# Patient Record
Sex: MALE | Race: WHITE | NOT HISPANIC OR LATINO | Employment: OTHER | URBAN - METROPOLITAN AREA
[De-identification: names, ages, dates, MRNs, and addresses within clinical notes are randomized per-mention and may not be internally consistent; named-entity substitution may affect disease eponyms.]

---

## 2017-01-05 DIAGNOSIS — E03.9 ACQUIRED HYPOTHYROIDISM: ICD-10-CM

## 2017-01-05 DIAGNOSIS — E78.5 HYPERLIPIDEMIA WITH TARGET LDL LESS THAN 100: ICD-10-CM

## 2017-01-05 DIAGNOSIS — I10 HYPERTENSION GOAL BP (BLOOD PRESSURE) < 140/90: ICD-10-CM

## 2017-01-05 DIAGNOSIS — M10.9 GOUT, UNSPECIFIED: ICD-10-CM

## 2017-01-05 LAB
ALT SERPL W P-5'-P-CCNC: 33 U/L (ref 0–70)
ANION GAP SERPL CALCULATED.3IONS-SCNC: 8 MMOL/L (ref 3–14)
AST SERPL W P-5'-P-CCNC: 24 U/L (ref 0–45)
BUN SERPL-MCNC: 24 MG/DL (ref 7–30)
CALCIUM SERPL-MCNC: 9.3 MG/DL (ref 8.5–10.1)
CHLORIDE SERPL-SCNC: 108 MMOL/L (ref 94–109)
CHOLEST SERPL-MCNC: 172 MG/DL
CO2 SERPL-SCNC: 25 MMOL/L (ref 20–32)
CREAT SERPL-MCNC: 1.42 MG/DL (ref 0.66–1.25)
GFR SERPL CREATININE-BSD FRML MDRD: 50 ML/MIN/1.7M2
GLUCOSE SERPL-MCNC: 98 MG/DL (ref 70–99)
HDLC SERPL-MCNC: 69 MG/DL
LDLC SERPL CALC-MCNC: 85 MG/DL
NONHDLC SERPL-MCNC: 103 MG/DL
POTASSIUM SERPL-SCNC: 4.4 MMOL/L (ref 3.4–5.3)
SODIUM SERPL-SCNC: 141 MMOL/L (ref 133–144)
TRIGL SERPL-MCNC: 89 MG/DL
TSH SERPL DL<=0.005 MIU/L-ACNC: 2.1 MU/L (ref 0.4–4)
URATE SERPL-MCNC: 5.7 MG/DL (ref 3.5–7.2)

## 2017-01-05 PROCEDURE — 80061 LIPID PANEL: CPT | Performed by: INTERNAL MEDICINE

## 2017-01-05 PROCEDURE — 36415 COLL VENOUS BLD VENIPUNCTURE: CPT | Performed by: INTERNAL MEDICINE

## 2017-01-05 PROCEDURE — 84450 TRANSFERASE (AST) (SGOT): CPT | Performed by: INTERNAL MEDICINE

## 2017-01-05 PROCEDURE — 84443 ASSAY THYROID STIM HORMONE: CPT | Performed by: INTERNAL MEDICINE

## 2017-01-05 PROCEDURE — 80048 BASIC METABOLIC PNL TOTAL CA: CPT | Performed by: INTERNAL MEDICINE

## 2017-01-05 PROCEDURE — 84550 ASSAY OF BLOOD/URIC ACID: CPT | Performed by: INTERNAL MEDICINE

## 2017-01-05 PROCEDURE — 84460 ALANINE AMINO (ALT) (SGPT): CPT | Performed by: INTERNAL MEDICINE

## 2017-01-05 NOTE — Clinical Note
[address] [place] [address]    January 6, 2017    Wei Kline  [address] [facility] [address]          Dear Rebekah Carvajalit lab to be discussed in clinic.  Enclosed is a copy of your results.     Results for orders placed or performed in visit on 01/05/17   Lipid panel reflex to direct LDL   Result Value Ref Range    Cholesterol 172 <200 mg/dL    Triglycerides 89 <150 mg/dL    HDL Cholesterol 69 >39 mg/dL    LDL Cholesterol Calculated 85 <100 mg/dL    Non HDL Cholesterol 103 <130 mg/dL   AST   Result Value Ref Range    AST 24 0 - 45 U/L   ALT   Result Value Ref Range    ALT 33 0 - 70 U/L   Basic metabolic panel   Result Value Ref Range    Sodium 141 133 - 144 mmol/L    Potassium 4.4 3.4 - 5.3 mmol/L    Chloride 108 94 - 109 mmol/L    Carbon Dioxide 25 20 - 32 mmol/L    Anion Gap 8 3 - 14 mmol/L    Glucose 98 70 - 99 mg/dL    Urea Nitrogen 24 7 - 30 mg/dL    Creatinine 1.42 (H) 0.66 - 1.25 mg/dL    GFR Estimate 50 (L) >60 mL/min/1.7m2    GFR Estimate If Black 60 (L) >60 mL/min/1.7m2    Calcium 9.3 8.5 - 10.1 mg/dL   TSH with free T4 reflex   Result Value Ref Range    TSH 2.10 0.40 - 4.00 mU/L   Uric acid   Result Value Ref Range    Uric Acid 5.7 3.5 - 7.2 mg/dL       If you have any questions or concerns, please call myself or my nurse at [phone].      Sincerely,        Geno Mcclelland MD/pb

## 2017-02-07 ENCOUNTER — OFFICE VISIT (OUTPATIENT)
Dept: PODIATRY | Facility: CLINIC | Age: 67
End: 2017-02-07
Payer: COMMERCIAL

## 2017-02-07 VITALS — WEIGHT: 290 LBS | BODY MASS INDEX: 49.51 KG/M2 | HEIGHT: 64 IN | HEART RATE: 82 BPM

## 2017-02-07 DIAGNOSIS — B35.1 DERMATOPHYTOSIS OF NAIL: ICD-10-CM

## 2017-02-07 DIAGNOSIS — L60.0 INGROWING NAIL: Primary | ICD-10-CM

## 2017-02-07 PROCEDURE — 99203 OFFICE O/P NEW LOW 30 MIN: CPT | Performed by: PODIATRIST

## 2017-02-07 NOTE — NURSING NOTE
"Chief Complaint   Patient presents with     Ingrown Toenail     R big toe     Fungal Infection     Right big toe       Initial Pulse 82  Ht 1.626 m (5' 4\")  Wt 131.543 kg (290 lb)  BMI 49.75 kg/m2 Estimated body mass index is 49.75 kg/(m^2) as calculated from the following:    Height as of this encounter: 1.626 m (5' 4\").    Weight as of this encounter: 131.543 kg (290 lb).  Medication Reconciliation: complete  "

## 2017-02-07 NOTE — PATIENT INSTRUCTIONS
We wish you continued good healing. If you have any questions or concerns, please do not hesitate to contact us at [phone].      Please remember to call and schedule a follow up appointment if one was recommended at your earliest convenience.   PODIATRY CLINIC HOURS  TELEPHONE NUMBER    Dr. Benjamin Murrieta D.P.M [facility]    Clinics:  [facility]        Gabrielle Walls MA  Medical Assistant  Tuesday 1PM-6PM  [place]  Wednesday 7AM-2PM  [place]  Thursday 10AM-6PM  [place] Friday 7AM-345PM  [place]  Specialty schedulers:   [phone] to make an appointment with any Specialty Provider.        Urgent Care locations:    [facility] Monday-Friday 5 pm - 9 pm. Saturday-Sunday 9 am -5pm    Monday-Friday 11 am - 9 pm Saturday 9 am - 5 pm     Monday-Sunday 12 noon-8PM [phone]     If you need a medication refill, please contact us you may need lab work and/or a follow up visit prior to your refill (i.e. Antifungal medications).    Gigglehart (secure e-mail communication and access to your chart) to send a message or to make an appointment.    If MRI needed please call Abdullahi Burroughs at [phone]        Weight management plan: Patient was referred to their PCP to discuss a diet and exercise plan.

## 2017-02-08 NOTE — PROGRESS NOTES
Subjective:    Pt is seen today as a new pt referral w/ the c/c of a painful ingrown right great nail lateral border.  This has been problematic for many years and had temporary in the past.   negativehistory of drainage from the site. This is slowly getting worse.  Aggravated by activity and relieved by rest.  Has tried soaking which has not helped.   He also has thickness on this nail and wondering what is causing this.  He is retired.      ROS:  Denies fever and chill, denies numbness and tingling, denies erythema on dorsum of foot.     No Known Allergies    Current Outpatient Prescriptions   Medication Sig Dispense Refill     telmisartan (MICARDIS) 80 MG tablet Take 1 tablet (80 mg) by mouth daily 90 tablet 0     levothyroxine (SYNTHROID, LEVOTHROID) 137 MCG tablet TAKE 1 TABLET (137 MCG) BY MOUTH DAILY 90 tablet 4     metoprolol (LOPRESSOR) 25 MG tablet Take 0.5 tablets (12.5 mg) by mouth 2 times daily 180 tablet 4     allopurinol (ZYLOPRIM) 300 MG tablet Take 1 tablet (300 mg) by mouth daily 90 tablet 4     simvastatin (ZOCOR) 20 MG tablet Take 1 tablet (20 mg) by mouth At Bedtime 90 tablet 4     Cholecalciferol (VITAMIN D) 2000 UNITS CAPS Take 4,000 mg by mouth daily       Multiple Vitamin (MULTI VITAMIN DAILY PO) Take 1 tablet by mouth daily       FOLIC ACID PO Take 1 mg by mouth daily With DHA       aspirin 81 MG tablet Take 81 mg by mouth daily       fluticasone (FLONASE) 50 MCG/ACT nasal spray Spray 1-2 sprays into both nostrils daily 3 Package 3       Patient Active Problem List   Diagnosis     Gout     Hypothyroidism     History of Lyme disease     Hyperlipidemia with target LDL less than 100     Hypertension goal BP (blood pressure) < 140/90     History of cardiomyopathy     LILLY (obstructive sleep apnea)     Kidney stones     Chronic kidney disease, stage III (moderate)       Past Medical History   Diagnosis Date     Gout, unspecified 1/16/2015     Diagnosed in 2009 at [facility], no flares since starting  "allopurinol.     Hypothyroidism 1/16/2015     History of Lyme disease 1/16/2015     Hyperlipidemia LDL goal < 100 1/16/2015     Hypertension goal BP (blood pressure) < 140/90 1/16/2015     History of cardiomyopathy 1/16/2015     Had angiogram in 2002- clean arteries.  Thought to be viral.  Mild decrease in EF initially but last echo in 2014 was normal.       Past Surgical History   Procedure Laterality Date     Arthroscopic knee surgery  1990's     bilateral knees (no replacement)     Tonsillectomy         Family History   Problem Relation Age of Onset     Breast Cancer Mother      CANCER Father      CEREBROVASCULAR DISEASE Paternal Grandmother      Cancer - colorectal Paternal Grandfather      Cancer - colorectal Brother      DIABETES Brother      Peptic Ulcer Disease Brother      Lupus Sister      Hypertension Son        Social History   Substance Use Topics     Smoking status: Former Smoker -- 0.50 packs/day for 5 years     Types: Cigarettes     Start date: 01/16/1955     Quit date: 01/16/1960     Smokeless tobacco: Never Used     Alcohol Use: Yes      Comment: occasional         Pulse 82  Ht 1.626 m (5' 4\")  Wt 131.543 kg (290 lb)  BMI 49.75 kg/m2.  A&O X 3.  Good historian.  Pulses DP, PT 2/4 b/l.  CRT < 3 seconds X 10 digits.  Bilateral ankle edema or varicosities noted.  Sensation to light touch intact b/l.  Reflexes 2/4 b/l.  Skin has normal texture and turgor b/l.  Normal arch with weightbearing.  No forefoot or rear foot deformities noted.  MS 5/5 all compartments.  Normal ROM all fore foot and rearfoot joints.  No equinus.  right great toe naillateral border shows soft tissue impingement with localized erythema.   negative active drainage/purulence at this time.  No sinus tracts.  No nailbed masses or exostosis.  No pain with range of motion of IPJ or MTPJ.  No ascending cellulitis.  This nail is mycotic and very long.      ASSESSMENT:    Onychocryptosis with paronychia aforementioned " toe  Onychomycosis     Discussed etiology and treatment options in detail w/ the patient.  The potential causes and nature of an ingrown toenail were discussed with the patient.  We reviewed the natural history/prognosis of the condition and potential risks if no treatment is provided.      Treatment options discussed included conservative management (oral antibiotics, soaking of foot, adequate width shoes)  as well as surgical management (partial or total nail removal).  The pros and cons of both forms of treatment were reviewed.  Handout given to patient.     After thorough discussion and answering all questions, the patient elected to keep nail trimmed shorter, which we performed today, and will refer to foot service.   No evidence of deep abscess or ulcer noted.  Pt tolerated procedure well.  Dressed with bandaid.  If still painful may need resection of nail border and also discussed permanent but he declines.  Discussed all options with patient regarding onychomycosis.   Risks, complications, and efficacy of going on lamisil were discussed with the patient.  He declines and will just keep this trimmed.  Return to clinic prn.    Benjamin Murrieta DPM, FACFAS

## 2017-03-03 ENCOUNTER — OFFICE VISIT (OUTPATIENT)
Dept: FAMILY MEDICINE | Facility: CLINIC | Age: 67
End: 2017-03-03
Payer: COMMERCIAL

## 2017-03-03 VITALS
TEMPERATURE: 96.6 F | WEIGHT: 290 LBS | DIASTOLIC BLOOD PRESSURE: 70 MMHG | HEIGHT: 70 IN | BODY MASS INDEX: 41.52 KG/M2 | SYSTOLIC BLOOD PRESSURE: 118 MMHG | OXYGEN SATURATION: 96 % | HEART RATE: 71 BPM

## 2017-03-03 DIAGNOSIS — E03.9 ACQUIRED HYPOTHYROIDISM: ICD-10-CM

## 2017-03-03 DIAGNOSIS — M10.9 GOUT, UNSPECIFIED: ICD-10-CM

## 2017-03-03 DIAGNOSIS — E78.5 HYPERLIPIDEMIA WITH TARGET LDL LESS THAN 100: ICD-10-CM

## 2017-03-03 DIAGNOSIS — I10 HYPERTENSION GOAL BP (BLOOD PRESSURE) < 140/90: ICD-10-CM

## 2017-03-03 DIAGNOSIS — Z00.00 ROUTINE GENERAL MEDICAL EXAMINATION AT A HEALTH CARE FACILITY: Primary | ICD-10-CM

## 2017-03-03 DIAGNOSIS — Z23 NEED FOR VACCINATION WITH 13-POLYVALENT PNEUMOCOCCAL CONJUGATE VACCINE: ICD-10-CM

## 2017-03-03 DIAGNOSIS — Z12.5 SCREENING FOR PROSTATE CANCER: ICD-10-CM

## 2017-03-03 LAB
ALT SERPL W P-5'-P-CCNC: 36 U/L (ref 0–70)
ANION GAP SERPL CALCULATED.3IONS-SCNC: 6 MMOL/L (ref 3–14)
AST SERPL W P-5'-P-CCNC: 33 U/L (ref 0–45)
BUN SERPL-MCNC: 25 MG/DL (ref 7–30)
CALCIUM SERPL-MCNC: 9.4 MG/DL (ref 8.5–10.1)
CHLORIDE SERPL-SCNC: 107 MMOL/L (ref 94–109)
CHOLEST SERPL-MCNC: 166 MG/DL
CO2 SERPL-SCNC: 28 MMOL/L (ref 20–32)
CREAT SERPL-MCNC: 1.25 MG/DL (ref 0.66–1.25)
GFR SERPL CREATININE-BSD FRML MDRD: 57 ML/MIN/1.7M2
GLUCOSE SERPL-MCNC: 99 MG/DL (ref 70–99)
HDLC SERPL-MCNC: 64 MG/DL
LDLC SERPL CALC-MCNC: 78 MG/DL
NONHDLC SERPL-MCNC: 102 MG/DL
POTASSIUM SERPL-SCNC: 4.3 MMOL/L (ref 3.4–5.3)
PSA SERPL-ACNC: 0.89 UG/L (ref 0–4)
SODIUM SERPL-SCNC: 141 MMOL/L (ref 133–144)
TRIGL SERPL-MCNC: 122 MG/DL
TSH SERPL DL<=0.005 MIU/L-ACNC: 0.74 MU/L (ref 0.4–4)
URATE SERPL-MCNC: 5.8 MG/DL (ref 3.5–7.2)

## 2017-03-03 PROCEDURE — 84443 ASSAY THYROID STIM HORMONE: CPT | Performed by: INTERNAL MEDICINE

## 2017-03-03 PROCEDURE — 80061 LIPID PANEL: CPT | Performed by: INTERNAL MEDICINE

## 2017-03-03 PROCEDURE — G0103 PSA SCREENING: HCPCS | Performed by: INTERNAL MEDICINE

## 2017-03-03 PROCEDURE — 36415 COLL VENOUS BLD VENIPUNCTURE: CPT | Performed by: INTERNAL MEDICINE

## 2017-03-03 PROCEDURE — 99397 PER PM REEVAL EST PAT 65+ YR: CPT | Mod: 25 | Performed by: INTERNAL MEDICINE

## 2017-03-03 PROCEDURE — G0009 ADMIN PNEUMOCOCCAL VACCINE: HCPCS | Performed by: INTERNAL MEDICINE

## 2017-03-03 PROCEDURE — 90670 PCV13 VACCINE IM: CPT | Performed by: INTERNAL MEDICINE

## 2017-03-03 PROCEDURE — 80048 BASIC METABOLIC PNL TOTAL CA: CPT | Performed by: INTERNAL MEDICINE

## 2017-03-03 PROCEDURE — 84550 ASSAY OF BLOOD/URIC ACID: CPT | Performed by: INTERNAL MEDICINE

## 2017-03-03 PROCEDURE — 84450 TRANSFERASE (AST) (SGOT): CPT | Performed by: INTERNAL MEDICINE

## 2017-03-03 PROCEDURE — 84460 ALANINE AMINO (ALT) (SGPT): CPT | Performed by: INTERNAL MEDICINE

## 2017-03-03 RX ORDER — LEVOTHYROXINE SODIUM 137 UG/1
TABLET ORAL
Qty: 90 TABLET | Refills: 4 | Status: SHIPPED | OUTPATIENT
Start: 2017-03-03 | End: 2018-03-08

## 2017-03-03 RX ORDER — ALLOPURINOL 300 MG/1
300 TABLET ORAL DAILY
Qty: 90 TABLET | Refills: 4 | Status: SHIPPED | OUTPATIENT
Start: 2017-03-03 | End: 2018-03-08

## 2017-03-03 RX ORDER — METOPROLOL TARTRATE 25 MG/1
12.5 TABLET, FILM COATED ORAL 2 TIMES DAILY
Qty: 180 TABLET | Refills: 4 | Status: SHIPPED | OUTPATIENT
Start: 2017-03-03 | End: 2018-03-08

## 2017-03-03 RX ORDER — SIMVASTATIN 20 MG
20 TABLET ORAL AT BEDTIME
Qty: 90 TABLET | Refills: 4 | Status: CANCELLED | OUTPATIENT
Start: 2017-03-03

## 2017-03-03 RX ORDER — TELMISARTAN 80 MG/1
80 TABLET ORAL DAILY
Qty: 90 TABLET | Refills: 3 | Status: SHIPPED | OUTPATIENT
Start: 2017-03-03 | End: 2017-03-18

## 2017-03-03 ASSESSMENT — ANXIETY QUESTIONNAIRES
3. WORRYING TOO MUCH ABOUT DIFFERENT THINGS: NOT AT ALL
2. NOT BEING ABLE TO STOP OR CONTROL WORRYING: NOT AT ALL
6. BECOMING EASILY ANNOYED OR IRRITABLE: NOT AT ALL
GAD7 TOTAL SCORE: 0
1. FEELING NERVOUS, ANXIOUS, OR ON EDGE: NOT AT ALL
5. BEING SO RESTLESS THAT IT IS HARD TO SIT STILL: NOT AT ALL
7. FEELING AFRAID AS IF SOMETHING AWFUL MIGHT HAPPEN: NOT AT ALL

## 2017-03-03 ASSESSMENT — PATIENT HEALTH QUESTIONNAIRE - PHQ9: 5. POOR APPETITE OR OVEREATING: NOT AT ALL

## 2017-03-03 ASSESSMENT — PAIN SCALES - GENERAL: PAINLEVEL: NO PAIN (0)

## 2017-03-03 NOTE — NURSING NOTE
"Chief Complaint   Patient presents with     Physical       Initial /70  Pulse 71  Temp 96.6  F (35.9  C) (Oral)  Ht 5' 10\" (1.778 m)  Wt 290 lb (131.5 kg)  SpO2 96%  BMI 41.61 kg/m2 Estimated body mass index is 41.61 kg/(m^2) as calculated from the following:    Height as of this encounter: 5' 10\" (1.778 m).    Weight as of this encounter: 290 lb (131.5 kg).  Medication Reconciliation: complete   Erica Rodriguez MA    "

## 2017-03-03 NOTE — NURSING NOTE
Screening Questionnaire for Adult Immunization    Are you sick today?   No   Do you have allergies to medications, food, a vaccine component or latex?   No   Have you ever had a serious reaction after receiving a vaccination?   No   Do you have a long-term health problem with heart disease, lung disease, asthma, kidney disease, metabolic disease (e.g. diabetes), anemia, or other blood disorder?   No   Do you have cancer, leukemia, HIV/AIDS, or any other immune system problem?   No   In the past 3 months, have you taken medications that affect  your immune system, such as prednisone, other steroids, or anticancer drugs; drugs for the treatment of rheumatoid arthritis, Crohn s disease, or psoriasis; or have you had radiation treatments?   No   Have you had a seizure, or a brain or other nervous system problem?   No   During the past year, have you received a transfusion of blood or blood     products, or been given immune (gamma) globulin or antiviral drug?   No   For women: Are you pregnant or is there a chance you could become        pregnant during the next month?   No   Have you received any vaccinations in the past 4 weeks?   No     Immunization questionnaire answers were all negative.      MNVFC doesn't apply on this patient    Per orders of Dr. Mcclelland, injection of Prevnar 13 given by Darlene Tobias. Patient instructed to remain in clinic for 20 minutes afterwards, and to report any adverse reaction to me immediately.       Screening performed by Darlene Tobias on 3/3/2017 at 7:53 AM.

## 2017-03-03 NOTE — PROGRESS NOTES
Normal liver blood test. Good cholesterol. Normal electrolytes. Improved kidney function.  Normal thyroid. Normal prostate blood test. Normal uric acid.

## 2017-03-03 NOTE — PATIENT INSTRUCTIONS
Preventive Health Recommendations:       Male Ages 65 and over    Yearly exam:             See your health care provider every year in order to  o   Review health changes.   o   Discuss preventive care.    o   Review your medicines if your doctor has prescribed any.    Talk with your health care provider about whether you should have a test to screen for prostate cancer (PSA).    Every 3 years, have a diabetes test (fasting glucose). If you are at risk for diabetes, you should have this test more often.    Every 5 years, have a cholesterol test. Have this test more often if you are at risk for high cholesterol or heart disease.     Every 10 years, have a colonoscopy. Or, have a yearly FIT test (stool test). These exams will check for colon cancer.    Talk to with your health care provider about screening for Abdominal Aortic Aneurysm if you have a family history of AAA or have a history of smoking.  Shots:     Get a flu shot each year.     Get a tetanus shot every 10 years.     Talk to your doctor about your pneumonia vaccines. There are now two you should receive - Pneumovax (PPSV 23) and Prevnar (PCV 13).    Talk to your doctor about a shingles vaccine.     Talk to your doctor about the hepatitis B vaccine.  Nutrition:     Eat at least 5 servings of fruits and vegetables each day.     Eat whole-grain bread, whole-wheat pasta and brown rice instead of white grains and rice.     Talk to your doctor about Calcium and Vitamin D.   Lifestyle    Exercise for at least 150 minutes a week (30 minutes a day, 5 days a week). This will help you control your weight and prevent disease.     Limit alcohol to one drink per day.     No smoking.     Wear sunscreen to prevent skin cancer.     See your dentist every six months for an exam and cleaning.     See your eye doctor every 1 to 2 years to screen for conditions such as glaucoma, macular degeneration and cataracts.      [facility]    If you have any questions  regarding to your visit please contact your care team:     Team Pink:   Clinic Hours Telephone Number   Internal Medicine:  Dr. Geno Thomas, NP       7am-7pm  Monday - Thursday   7am-5pm  Fridays  [phone]  (Appointment scheduling available 24/7)    Questions about your visit?  Team Line  [phone]   Urgent Care - [place] and [facility] - 11am-9pm Monday-Friday Saturday-Sunday- 9am-5pm   [place] - 5pm-9pm Monday-Friday Saturday-Sunday- 9am-5pm  [phone] - [facility]  [phone] - [place]       What options do I have for visits at the clinic other than the traditional office visit?  To expand how we care for you, many of our providers are utilizing electronic visits (e-visits) and telephone visits, when medically appropriate, for interactions with their patients rather than a visit in the clinic.   We also offer nurse visits for many medical concerns. Just like any other service, we will bill your insurance company for this type of visit based on time spent on the phone with your provider. Not all insurance companies cover these visits. Please check with your medical insurance if this type of visit is covered. You will be responsible for any charges that are not paid by your insurance.      E-visits via [email or web address]:  generally incur a $35.00 fee.  Telephone visits:  Time spent on the phone: *charged based on time that is spent on the phone in increments of 10 minutes. Estimated cost:   5-10 mins $30.00   11-20 mins. $59.00   21-30 mins. $85.00   Use Alives[employer]est (secure email communication and access to your chart) to send your primary care provider a message or make an appointment. Ask someone on your Team how to sign up for [employer].    For a Price Quote for your services, please call our Consumer Price Line at [phone].    As always, Thank you for trusting us with your health care needs!    Discharged by Darlene TEIXEIRA CMA ([facility])

## 2017-03-03 NOTE — PROGRESS NOTES
INTERNAL MEDICINE   SUBJECTIVE:                                                            Wei Kline is a 67 year old male who presents for Preventive Visit.      Are you in the first 12 months of your Medicare Part B coverage?  No    Healthy Habits:    Do you get at least three servings of calcium containing foods daily (dairy, green leafy vegetables, etc.)? yes    Amount of exercise or daily activities, outside of work: none    Problems taking medications regularly No    Medication side effects: No    Have you had an eye exam in the past two years? no    Do you see a dentist twice per year? yes    Do you have sleep apnea, excessive snoring or daytime drowsiness?CPap    COGNITIVE SCREEN  1) Repeat 3 items (Banana, Sunrise, Chair)    2) Clock draw: NORMAL  3) 3 item recall: Recalls 3 objects  Results: 3 items recalled: COGNITIVE IMPAIRMENT LESS LIKELY    Mini-CogTM Copyright S Renny. Licensed by the author for use in [facility]; reprinted with permission (robb@[facility]). All rights reserved.        Reviewed and updated as needed this visit by clinical staff       Reviewed and updated as needed this visit by Provider        Social History   Substance Use Topics     Smoking status: Former Smoker     Packs/day: 0.50     Years: 5.00     Types: Cigarettes     Start date: 1/16/1955     Quit date: 1/16/1960     Smokeless tobacco: Never Used     Alcohol use Yes      Comment: occasional       The patient does not drink >3 drinks per day nor >7 drinks per week.    Today's PHQ-2 Score:   PHQ-2 ( 1999 Pf[employer]) 3/1/2016 1/16/2015   Q1: Little interest or pleasure in doing things 0 0   Q2: Feeling down, depressed or hopeless 0 0   PHQ-2 Score 0 0       Do you feel safe in your environment - Yes    Do you have a Health Care Directive?: No: Advance care planning was reviewed with patient; patient declined at this time.    Current providers sharing in care for this patient include:   Patient Care Team:  Geno Mcclelland  MD Alicia as PCP - General (Internal Medicine)      Hearing impairment: No    Ability to successfully perform activities of daily living: Yes, no assistance needed     Fall risk:  Fallen 2 or more times in the past year?: No  Any fall with injury in the past year?: No    Home safety:  none identified      The following health maintenance items are reviewed in Epic and correct as of today:  Health Maintenance   Topic Date Due     ADVANCE DIRECTIVE PLANNING Q5 YRS (NO INBASKET)  02/05/1967     HEPATITIS C SCREENING  02/05/1967     PNEUMOCOCCAL (2 of 2 - PCV13) 01/16/2016     FALL RISK ASSESSMENT  03/01/2017     INFLUENZA VACCINE (SYSTEM ASSIGNED)  09/01/2017     TETANUS IMMUNIZATION (SYSTEM ASSIGNED)  09/04/2019     COLON CANCER SCREEN (SYSTEM ASSIGNED)  04/30/2020     LIPID SCREEN Q5 YR MALE (SYSTEM ASSIGNED)  01/05/2022     AORTIC ANEURYSM SCREENING (SYSTEM ASSIGNED)  Completed       Health Check: He is interested in prostate cancer screening via PSA testing. He reports that he has not had another bout of pneumonia or URI recently. He has had no trouble or issues with taking his medications.       ROS:  C: NEGATIVE for fever, chills, change in weight  R: NEGATIVE for significant cough or SOB  CV: NEGATIVE for chest pain, palpitations or peripheral edema  GI: NEGATIVE for nausea, abdominal pain, heartburn, or change in bowel habits  : NEGATIVE for frequency, dysuria, or hematuria  All other systems reviewed and were negative.      This document serves as a record of the services and decisions personally performed and made by Geno Mcclelland MD. It was created on his/her behalf by Sushma aWde, a trained medical scribe. The creation of this document is based the provider's statements to the medical scribe.    Major Wade 7:27 AM, March 3, 2017    Problem list, Medication list, Allergies, and Medical/Social/Surgical histories reviewed in [facility] and updated as appropriate.  Labs reviewed in [facility]  OBJECTIVE:    "                                                         /70  Pulse 71  Temp 96.6  F (35.9  C) (Oral)  Ht 1.778 m (5' 10\")  Wt 131.5 kg (290 lb)  SpO2 96%  BMI 41.61 kg/m2 Estimated body mass index is 49.78 kg/(m^2) as calculated from the following:    Height as of 2/7/17: 5' 4\" (1.626 m).    Weight as of 2/7/17: 290 lb (131.5 kg).  EXAM:   GENERAL: healthy, alert and no distress  EYES: Eyes grossly normal to inspection, PERRL and conjunctivae and sclerae normal  HENT: ear canals and TM's normal, nose and mouth without ulcers or lesions  NECK: no adenopathy, no asymmetry, masses, or scars and thyroid normal to palpation  RESP: lungs clear to auscultation - no rales, rhonchi or wheezes  CV: regular rate and rhythm, normal S1 S2, no S3 or S4, no murmur, click or rub, no peripheral edema and peripheral pulses strong  ABDOMEN: soft, nontender, no hepatosplenomegaly, no masses and bowel sounds normal   (male): normal male genitalia without lesions or urethral discharge, no hernia  RECTAL: normal sphincter tone, no rectal masses, prostate normal size, smooth, nontender without nodules or masses, firm prostatic rim  MS: no gross musculoskeletal defects noted, trace pretibial edema   SKIN: no suspicious lesions or rashes  PSYCH: mentation appears normal, affect normal/bright    ASSESSMENT / PLAN:                                                            I spent 23 minutes of time with the patient and >50% of it was in education and counseling regarding preventive healthcare.     1. Hypertension goal BP (blood pressure) < 140/90   The current medical regimen is effective; continue present plan and medications    - telmisartan (MICARDIS) 80 MG tablet; Take 1 tablet (80 mg) by mouth daily  Dispense: 90 tablet; Refill: 3  - metoprolol (LOPRESSOR) 25 MG tablet; Take 0.5 tablets (12.5 mg) by mouth 2 times daily  Dispense: 180 tablet; Refill: 4  - Basic metabolic panel    2. Acquired hypothyroidism   The current medical " "regimen is effective; continue present plan and medications    - levothyroxine (SYNTHROID/LEVOTHROID) 137 MCG tablet; TAKE 1 TABLET (137 MCG) BY MOUTH DAILY  Dispense: 90 tablet; Refill: 4  - TSH with free T4 reflex    3. Gout, unspecified   The current medical regimen is effective; continue present plan and medications    - allopurinol (ZYLOPRIM) 300 MG tablet; Take 1 tablet (300 mg) by mouth daily  Dispense: 90 tablet; Refill: 4  - Uric acid    4. Hyperlipidemia with target LDL less than 100    - AST  - Lipid panel reflex to direct LDL  - ALT    5. Need for vaccination with 13-polyvalent pneumococcal conjugate vaccine   Given today in clinic   - PNEUMOCOCCAL CONJ VACCINE 13 VALENT IM (PREVNAR 13)    6. Routine general medical examination at a health care facility   Performed today in clinic     7. Screening for prostate cancer  Discussed with risks and benefits of screening.   - PSA, screen    End of Life Planning:  Patient currently has an advanced directive: Yes.  I have verified the patient's ablity to prepare an advanced directive/make health care decisions.  Literature was provided to assist patient in preparing an advanced directive.    COUNSELING:  Reviewed preventive health counseling, as reflected in patient instructions       Vaccinated for: Pneumococcal  Estimated body mass index is 49.78 kg/(m^2) as calculated from the following:    Height as of 2/7/17: 5' 4\" (1.626 m).    Weight as of 2/7/17: 290 lb (131.5 kg).  Weight management plan: Discussed healthy diet and exercise guidelines and patient will follow up in 12 months in clinic to re-evaluate.   reports that he quit smoking about 57 years ago. His smoking use included Cigarettes. He started smoking about 62 years ago. He has a 2.50 pack-year smoking history. He has never used smokeless tobacco.      Appropriate preventive services were discussed with this patient, including applicable screening as appropriate for cardiovascular disease, diabetes, " osteopenia/osteoporosis, and glaucoma.  As appropriate for age/gender, discussed screening for colorectal cancer, prostate cancer, breast cancer, and cervical cancer. Checklist reviewing preventive services available has been given to the patient.    Reviewed patients plan of care and provided an AVS. The Basic Care Plan (routine screening as documented in Health Maintenance) for Wei meets the Care Plan requirement. This Care Plan has been established and reviewed with the Patient.    Counseling Resources:  ATP IV Guidelines  Pooled Cohorts Equation Calculator  Breast Cancer Risk Calculator  FRAX Risk Assessment  ICSI Preventive Guidelines  Dietary Guidelines for Americans, 2010  [employer]'s MyPlate  ASA Prophylaxis  Lung CA Screening    The information in this document, created by the medical scribe for me, accurately reflects the services I personally performed and the decisions made by me. I have reviewed and approved this document for accuracy prior to leaving the patient care area.  Geno Mcclelland MD  7:25 AM, 03/03/17    Geno Mcclelland MD  [facility]    Start: 7:20 AM   End: 7:43 AM     Patient Instructions       Preventive Health Recommendations:       Male Ages 65 and over    Yearly exam:             See your health care provider every year in order to  o   Review health changes.   o   Discuss preventive care.    o   Review your medicines if your doctor has prescribed any.    Talk with your health care provider about whether you should have a test to screen for prostate cancer (PSA).    Every 3 years, have a diabetes test (fasting glucose). If you are at risk for diabetes, you should have this test more often.    Every 5 years, have a cholesterol test. Have this test more often if you are at risk for high cholesterol or heart disease.     Every 10 years, have a colonoscopy. Or, have a yearly FIT test (stool test). These exams will check for colon cancer.    Talk to with your health care provider  about screening for Abdominal Aortic Aneurysm if you have a family history of AAA or have a history of smoking.  Shots:     Get a flu shot each year.     Get a tetanus shot every 10 years.     Talk to your doctor about your pneumonia vaccines. There are now two you should receive - Pneumovax (PPSV 23) and Prevnar (PCV 13).    Talk to your doctor about a shingles vaccine.     Talk to your doctor about the hepatitis B vaccine.  Nutrition:     Eat at least 5 servings of fruits and vegetables each day.     Eat whole-grain bread, whole-wheat pasta and brown rice instead of white grains and rice.     Talk to your doctor about Calcium and Vitamin D.   Lifestyle    Exercise for at least 150 minutes a week (30 minutes a day, 5 days a week). This will help you control your weight and prevent disease.     Limit alcohol to one drink per day.     No smoking.     Wear sunscreen to prevent skin cancer.     See your dentist every six months for an exam and cleaning.     See your eye doctor every 1 to 2 years to screen for conditions such as glaucoma, macular degeneration and cataracts.      [facility]    If you have any questions regarding to your visit please contact your care team:     Team Pink:   Clinic Hours Telephone Number   Internal Medicine:  Dr. Geno Thomas, NP       7am-7pm  Monday - Thursday   7am-5pm  Fridays  [phone]  (Appointment scheduling available 24/7)    Questions about your visit?  Team Line  [phone]   Urgent Care - [place] - 11am-9pm Monday-Friday Saturday-Sunday- 9am-5pm   [place] - 5pm-9pm Monday-Friday Saturday-Sunday- 9am-5pm  [phone] - [place]   [phone] - [place]       What options do I have for visits at the clinic other than the traditional office visit?  To expand how we care for you, many of our providers are utilizing electronic visits (e-visits) and telephone visits, when medically  appropriate, for interactions with their patients rather than a visit in the clinic.   We also offer nurse visits for many medical concerns. Just like any other service, we will bill your insurance company for this type of visit based on time spent on the phone with your provider. Not all insurance companies cover these visits. Please check with your medical insurance if this type of visit is covered. You will be responsible for any charges that are not paid by your insurance.      E-visits via [employer]:  generally incur a $35.00 fee.  Telephone visits:  Time spent on the phone: *charged based on time that is spent on the phone in increments of 10 minutes. Estimated cost:   5-10 mins $30.00   11-20 mins. $59.00   21-30 mins. $85.00   Use [employer] (secure email communication and access to your chart) to send your primary care provider a message or make an appointment. Ask someone on your Team how to sign up for [employer].    For a Price Quote for your services, please call our Consumer Price Line at [phone].    As always, Thank you for trusting us with your health care needs!    Discharged by Darlene TEIXEIRA CMA ([facility])

## 2017-03-03 NOTE — MR AVS SNAPSHOT
After Visit Summary   3/3/2017    Wei Kline    MRN: [id number]           Patient Information     Date Of Birth          1950        Visit Information        Provider Department      3/3/2017 7:00 AM Geno Mcclelland MD [facility]        Today's Diagnoses     Routine general medical examination at a health care facility    -  1    Hypertension goal BP (blood pressure) < 140/90        Acquired hypothyroidism        Gout, unspecified        Hyperlipidemia with target LDL less than 100        Need for vaccination with 13-polyvalent pneumococcal conjugate vaccine        Screening for prostate cancer          Care Instructions      Preventive Health Recommendations:       Male Ages 65 and over    Yearly exam:             See your health care provider every year in order to  o   Review health changes.   o   Discuss preventive care.    o   Review your medicines if your doctor has prescribed any.    Talk with your health care provider about whether you should have a test to screen for prostate cancer (PSA).    Every 3 years, have a diabetes test (fasting glucose). If you are at risk for diabetes, you should have this test more often.    Every 5 years, have a cholesterol test. Have this test more often if you are at risk for high cholesterol or heart disease.     Every 10 years, have a colonoscopy. Or, have a yearly FIT test (stool test). These exams will check for colon cancer.    Talk to with your health care provider about screening for Abdominal Aortic Aneurysm if you have a family history of AAA or have a history of smoking.  Shots:     Get a flu shot each year.     Get a tetanus shot every 10 years.     Talk to your doctor about your pneumonia vaccines. There are now two you should receive - Pneumovax (PPSV 23) and Prevnar (PCV 13).    Talk to your doctor about a shingles vaccine.     Talk to your doctor about the hepatitis B vaccine.  Nutrition:     Eat at least 5 servings of fruits  and vegetables each day.     Eat whole-grain bread, whole-wheat pasta and brown rice instead of white grains and rice.     Talk to your doctor about Calcium and Vitamin D.   Lifestyle    Exercise for at least 150 minutes a week (30 minutes a day, 5 days a week). This will help you control your weight and prevent disease.     Limit alcohol to one drink per day.     No smoking.     Wear sunscreen to prevent skin cancer.     See your dentist every six months for an exam and cleaning.     See your eye doctor every 1 to 2 years to screen for conditions such as glaucoma, macular degeneration and cataracts.      [facility]    If you have any questions regarding to your visit please contact your care team:     Team Pink:   Clinic Hours Telephone Number   Internal Medicine:  Dr. Geno Thomas, NP       7am-7pm  Monday - Thursday   7am-5pm  Fridays  [phone]  (Appointment scheduling available 24/7)    Questions about your visit?  Team Line  [phone]   Urgent Care - [place] and [place] - 11am-9pm Monday-Friday Saturday-Sunday- 9am-5pm   [place] - 5pm-9pm Monday-Friday Saturday-Sunday- 9am-5pm  [phone] - [place]   [phone] - [place]       What options do I have for visits at the clinic other than the traditional office visit?  To expand how we care for you, many of our providers are utilizing electronic visits (e-visits) and telephone visits, when medically appropriate, for interactions with their patients rather than a visit in the clinic.   We also offer nurse visits for many medical concerns. Just like any other service, we will bill your insurance company for this type of visit based on time spent on the phone with your provider. Not all insurance companies cover these visits. Please check with your medical insurance if this type of visit is covered. You will be responsible for any charges that are not paid by your insurance.       E-visits via [employer]:  generally incur a $35.00 fee.  Telephone visits:  Time spent on the phone: *charged based on time that is spent on the phone in increments of 10 minutes. Estimated cost:   5-10 mins $30.00   11-20 mins. $59.00   21-30 mins. $85.00   Use [employer] (secure email communication and access to your chart) to send your primary care provider a message or make an appointment. Ask someone on your Team how to sign up for [employer].    For a Price Quote for your services, please call our Single[employer] Line at [phone].    As always, Thank you for trusting us with your health care needs!    Discharged by Darlene TEIXEIRA CMA ([facility])          Follow-ups after your visit        Who to contact     If you have questions or need follow up information about today's clinic visit or your schedule please contact [facility] directly at [phone].  Normal or non-critical lab and imaging results will be communicated to you by [employer], letter or phone within 4 business days after the clinic has received the results. If you do not hear from us within 7 days, please contact the clinic through [employer] or phone. If you have a critical or abnormal lab result, we will notify you by phone as soon as possible.  Submit refill requests through [employer] or call your pharmacy and they will forward the refill request to us. Please allow 3 business days for your refill to be completed.          Additional Information About Your Visit        [employer] Information     [employer] gives you secure access to your electronic health record. If you see a primary care provider, you can also send messages to your care team and make appointments. If you have questions, please call your primary care clinic.  If you do not have a primary care provider, please call [phone] and they will assist you.        Care EveryWhere ID     This is your Care EveryWhere ID. This could be used by other organizations to access your [facility]  "records  [id number]        Your Vitals Were     Pulse Temperature Height Pulse Oximetry BMI (Body Mass Index)       71 96.6  F (35.9  C) (Oral) 5' 10\" (1.778 m) 96% 41.61 kg/m2        Blood Pressure from Last 3 Encounters:   03/03/17 118/70   03/01/16 124/78   02/04/15 128/82    Weight from Last 3 Encounters:   03/03/17 290 lb (131.5 kg)   02/07/17 290 lb (131.5 kg)   03/01/16 287 lb (130.2 kg)              We Performed the Following     ALT     AST     Basic metabolic panel     Lipid panel reflex to direct LDL     PNEUMOCOCCAL CONJ VACCINE 13 VALENT IM (PREVNAR 13)     PSA, screen     TSH with free T4 reflex     Uric acid          Where to get your medicines      These medications were sent to [id number] - [place], [place] - [address] [address]  [address] [address], [place] [address]    Hours:  test fax successful 4/5/04 kr Phone:  [phone]     allopurinol 300 MG tablet    levothyroxine 137 MCG tablet    metoprolol 25 MG tablet         Some of these will need a paper prescription and others can be bought over the counter.  Ask your nurse if you have questions.     Bring a paper prescription for each of these medications     telmisartan 80 MG tablet          Primary Care Provider Office Phone # Fax #    Geno Mcclelland [phone]       [address] [address]        Thank you!     Thank you for choosing [facility]  for your care. Our goal is always to provide you with excellent care. Hearing back from our patients is one way we can continue to improve our services. Please take a few minutes to complete the written survey that you may receive in the mail after your visit with us. Thank you!             Your Updated Medication List - Protect others around you: Learn how to safely use, store and throw away your medicines at www.disposemymeds.org.          This list is accurate as of: 3/3/17  7:44 AM.  Always use your most " recent med list.                   Brand Name Dispense Instructions for use    allopurinol 300 MG tablet    ZYLOPRIM    90 tablet    Take 1 tablet (300 mg) by mouth daily       aspirin 81 MG tablet      Take 81 mg by mouth daily       ATORVASTATIN CALCIUM PO      Take 20 mg by mouth daily       fluticasone 50 MCG/ACT spray    FLONASE    3 Package    Spray 1-2 sprays into both nostrils daily       FOLIC ACID PO      Take 1 mg by mouth daily With DHA       levothyroxine 137 MCG tablet    SYNTHROID/LEVOTHROID    90 tablet    TAKE 1 TABLET (137 MCG) BY MOUTH DAILY       metoprolol 25 MG tablet    LOPRESSOR    180 tablet    Take 0.5 tablets (12.5 mg) by mouth 2 times daily       MULTI VITAMIN DAILY PO      Take 1 tablet by mouth daily       simvastatin 20 MG tablet    ZOCOR    90 tablet    Take 1 tablet (20 mg) by mouth At Bedtime       telmisartan 80 MG tablet    MICARDIS    90 tablet    Take 1 tablet (80 mg) by mouth daily       vitamin D 2000 UNITS Caps      Take 4,000 mg by mouth daily

## 2017-03-04 ASSESSMENT — PATIENT HEALTH QUESTIONNAIRE - PHQ9: SUM OF ALL RESPONSES TO PHQ QUESTIONS 1-9: 0

## 2017-03-04 ASSESSMENT — ANXIETY QUESTIONNAIRES: GAD7 TOTAL SCORE: 0

## 2017-03-17 ENCOUNTER — TELEPHONE (OUTPATIENT)
Dept: FAMILY MEDICINE | Facility: CLINIC | Age: 67
End: 2017-03-17

## 2017-03-17 DIAGNOSIS — E78.5 HYPERLIPIDEMIA WITH TARGET LDL LESS THAN 100: ICD-10-CM

## 2017-03-17 RX ORDER — SIMVASTATIN 20 MG
20 TABLET ORAL AT BEDTIME
Qty: 90 TABLET | Refills: 4 | Status: SHIPPED | OUTPATIENT
Start: 2017-03-17 | End: 2017-03-22 | Stop reason: ALTCHOICE

## 2017-03-17 NOTE — TELEPHONE ENCOUNTER
Reason for Call:  Medication or medication refill:    Do you use a [facility]?  Name of the pharmacy and phone number for the current request:  [facility] [phone]    Name of the medication requested: simvastatin    Other request: na    Can we leave a detailed message on this number? YES    Phone number patient can be reached at: Home number on file [phone] (home)    Best Time: anytime    Call taken on 3/17/2017 at 2:28 PM by Tomeka Coe

## 2017-03-17 NOTE — TELEPHONE ENCOUNTER
Prescription approved per [facility] Refill Protocol.  Please notify    Eliza Gallo RN

## 2017-03-17 NOTE — TELEPHONE ENCOUNTER
simvastatin (ZOCOR) 20 MG tablet    Last Written Prescription Date: 3-1-16  Last Fill Quantity: 90, # refills: 4  Last Office Visit with [facility] P or [facility] prescribing provider: 3-3-17       Lab Results   Component Value Date    CHOL 166 03/03/2017     Lab Results   Component Value Date    HDL 64 03/03/2017     Lab Results   Component Value Date    LDL 78 03/03/2017     Lab Results   Component Value Date    TRIG 122 03/03/2017     Lab Results   Component Value Date    CHOLHDLRATIO 2.7 01/16/2015

## 2017-03-18 DIAGNOSIS — I10 HYPERTENSION GOAL BP (BLOOD PRESSURE) < 140/90: ICD-10-CM

## 2017-03-20 NOTE — TELEPHONE ENCOUNTER
Duplicate.    Medication Detail         Disp Refills Start End SWETHA     telmisartan (MICARDIS) 80 MG tablet 90 tablet 3 3/3/2017  No     Sig: Take 1 tablet (80 mg) by mouth daily     Class: Local Print     Route: Oral     Order: [id number]       Printout Tracking      External Result Report       Pharmacy      [id number] - [place], [place] - [address]       Associated Diagnoses      Hypertension goal BP (blood pressure) < 140/90 [I10]         Jodi Rice MA

## 2017-03-21 ENCOUNTER — TELEPHONE (OUTPATIENT)
Dept: FAMILY MEDICINE | Facility: CLINIC | Age: 67
End: 2017-03-21

## 2017-03-21 RX ORDER — TELMISARTAN 80 MG/1
TABLET ORAL
Qty: 90 TABLET | Refills: 2 | Status: SHIPPED | OUTPATIENT
Start: 2017-03-21 | End: 2017-10-28

## 2017-03-21 NOTE — TELEPHONE ENCOUNTER
Received fax from [employer].    Atrovastatin 20mg tab      Last Written Prescription Date:  0   Last Fill Quantity: 0,   # refills: 0  Last Office Visit with [facility], [facility] or [facility] prescribing provider: 03/03/2017  Future Office visit:       Routing refill request to provider for review/approval because:  Drug not active on patient's medication list

## 2017-03-22 NOTE — TELEPHONE ENCOUNTER
Called patient and patient stated he was prescribed atrovastatin by the cardiologist, but then was put back on simvastatin by Dr. Mcclelland. Which one should he be on?  Routing to provider.    Darlene TEIXEIRA CMA ([facility])

## 2017-03-22 NOTE — TELEPHONE ENCOUNTER
Patient has both Simvastatin 20mg and Atorvastatin 20mg on med list.  We just received a refill request for Simvastatin on 3/17/17 and now Atorvastatin.  Which one is he on? Should only be on 1 or the other.    Eliza Gallo RN

## 2017-12-12 ENCOUNTER — MYC MEDICAL ADVICE (OUTPATIENT)
Dept: INTERNAL MEDICINE | Facility: CLINIC | Age: 67
End: 2017-12-12

## 2017-12-12 DIAGNOSIS — G47.33 OSA (OBSTRUCTIVE SLEEP APNEA): Primary | ICD-10-CM

## 2017-12-13 NOTE — TELEPHONE ENCOUNTER
Per. DR. Mcclelland: ok for requested.    Printed to be signed.   Please notify patient.   I do not think we are able to send it via email      Eliza Gallo RN

## 2017-12-14 NOTE — TELEPHONE ENCOUNTER
Prescription/DME signed.  LM for patient to return call.  Unable to email.  Need to know if he wants to pick the DME up, have it mailed, or faxed?  Floridalma Delgado,

## 2018-01-29 ENCOUNTER — MYC MEDICAL ADVICE (OUTPATIENT)
Dept: INTERNAL MEDICINE | Facility: CLINIC | Age: 68
End: 2018-01-29

## 2018-01-29 DIAGNOSIS — E03.9 ACQUIRED HYPOTHYROIDISM: Primary | ICD-10-CM

## 2018-01-29 DIAGNOSIS — M10.9 GOUT, UNSPECIFIED CAUSE, UNSPECIFIED CHRONICITY, UNSPECIFIED SITE: ICD-10-CM

## 2018-01-29 DIAGNOSIS — E78.5 HYPERLIPIDEMIA WITH TARGET LDL LESS THAN 100: ICD-10-CM

## 2018-01-29 DIAGNOSIS — I10 HYPERTENSION GOAL BP (BLOOD PRESSURE) < 140/90: ICD-10-CM

## 2018-03-01 DIAGNOSIS — E78.5 HYPERLIPIDEMIA WITH TARGET LDL LESS THAN 100: ICD-10-CM

## 2018-03-01 DIAGNOSIS — M10.9 GOUT, UNSPECIFIED CAUSE, UNSPECIFIED CHRONICITY, UNSPECIFIED SITE: ICD-10-CM

## 2018-03-01 DIAGNOSIS — E03.9 ACQUIRED HYPOTHYROIDISM: ICD-10-CM

## 2018-03-01 DIAGNOSIS — I10 HYPERTENSION GOAL BP (BLOOD PRESSURE) < 140/90: ICD-10-CM

## 2018-03-01 LAB
ALT SERPL W P-5'-P-CCNC: 31 U/L (ref 0–70)
ANION GAP SERPL CALCULATED.3IONS-SCNC: 8 MMOL/L (ref 3–14)
AST SERPL W P-5'-P-CCNC: 31 U/L (ref 0–45)
BUN SERPL-MCNC: 31 MG/DL (ref 7–30)
CALCIUM SERPL-MCNC: 9.3 MG/DL (ref 8.5–10.1)
CHLORIDE SERPL-SCNC: 107 MMOL/L (ref 94–109)
CHOLEST SERPL-MCNC: 151 MG/DL
CO2 SERPL-SCNC: 25 MMOL/L (ref 20–32)
CREAT SERPL-MCNC: 1.3 MG/DL (ref 0.66–1.25)
ERYTHROCYTE [DISTWIDTH] IN BLOOD BY AUTOMATED COUNT: 13.5 % (ref 10–15)
GFR SERPL CREATININE-BSD FRML MDRD: 55 ML/MIN/1.7M2
GLUCOSE SERPL-MCNC: 92 MG/DL (ref 70–99)
HCT VFR BLD AUTO: 41.6 % (ref 40–53)
HDLC SERPL-MCNC: 51 MG/DL
HGB BLD-MCNC: 14 G/DL (ref 13.3–17.7)
LDLC SERPL CALC-MCNC: 79 MG/DL
MCH RBC QN AUTO: 31.2 PG (ref 26.5–33)
MCHC RBC AUTO-ENTMCNC: 33.7 G/DL (ref 31.5–36.5)
MCV RBC AUTO: 93 FL (ref 78–100)
NONHDLC SERPL-MCNC: 100 MG/DL
PLATELET # BLD AUTO: 158 10E9/L (ref 150–450)
POTASSIUM SERPL-SCNC: 4.3 MMOL/L (ref 3.4–5.3)
RBC # BLD AUTO: 4.49 10E12/L (ref 4.4–5.9)
SODIUM SERPL-SCNC: 140 MMOL/L (ref 133–144)
TRIGL SERPL-MCNC: 106 MG/DL
TSH SERPL DL<=0.005 MIU/L-ACNC: 0.86 MU/L (ref 0.4–4)
URATE SERPL-MCNC: 6.8 MG/DL (ref 3.5–7.2)
WBC # BLD AUTO: 7.2 10E9/L (ref 4–11)

## 2018-03-01 PROCEDURE — 84460 ALANINE AMINO (ALT) (SGPT): CPT | Performed by: FAMILY MEDICINE

## 2018-03-01 PROCEDURE — 84443 ASSAY THYROID STIM HORMONE: CPT | Performed by: FAMILY MEDICINE

## 2018-03-01 PROCEDURE — 85027 COMPLETE CBC AUTOMATED: CPT | Performed by: FAMILY MEDICINE

## 2018-03-01 PROCEDURE — 84550 ASSAY OF BLOOD/URIC ACID: CPT | Performed by: FAMILY MEDICINE

## 2018-03-01 PROCEDURE — 80061 LIPID PANEL: CPT | Performed by: FAMILY MEDICINE

## 2018-03-01 PROCEDURE — 84450 TRANSFERASE (AST) (SGOT): CPT | Performed by: FAMILY MEDICINE

## 2018-03-01 PROCEDURE — 36415 COLL VENOUS BLD VENIPUNCTURE: CPT | Performed by: FAMILY MEDICINE

## 2018-03-01 PROCEDURE — 80048 BASIC METABOLIC PNL TOTAL CA: CPT | Performed by: FAMILY MEDICINE

## 2018-03-08 RX ORDER — TELMISARTAN 80 MG/1
80 TABLET ORAL DAILY
Qty: 30 TABLET | Refills: 3 | Status: CANCELLED | OUTPATIENT
Start: 2018-03-08

## 2018-03-09 ASSESSMENT — ACTIVITIES OF DAILY LIVING (ADL)
CURRENT_FUNCTION: NO ASSISTANCE NEEDED
I_NEED_ASSISTANCE_FOR_THE_FOLLOWING_DAILY_ACTIVITIES:: NO ASSISTANCE IS NEEDED

## 2018-03-12 ENCOUNTER — RADIANT APPOINTMENT (OUTPATIENT)
Dept: GENERAL RADIOLOGY | Facility: CLINIC | Age: 68
End: 2018-03-12
Attending: INTERNAL MEDICINE
Payer: COMMERCIAL

## 2018-03-12 ENCOUNTER — OFFICE VISIT (OUTPATIENT)
Dept: INTERNAL MEDICINE | Facility: CLINIC | Age: 68
End: 2018-03-12
Payer: COMMERCIAL

## 2018-03-12 VITALS
WEIGHT: 302 LBS | DIASTOLIC BLOOD PRESSURE: 78 MMHG | RESPIRATION RATE: 16 BRPM | TEMPERATURE: 96.9 F | OXYGEN SATURATION: 96 % | HEIGHT: 71 IN | BODY MASS INDEX: 42.28 KG/M2 | HEART RATE: 77 BPM | SYSTOLIC BLOOD PRESSURE: 130 MMHG

## 2018-03-12 DIAGNOSIS — N20.0 KIDNEY STONES: ICD-10-CM

## 2018-03-12 DIAGNOSIS — G47.33 OSA (OBSTRUCTIVE SLEEP APNEA): ICD-10-CM

## 2018-03-12 DIAGNOSIS — Z00.00 MEDICARE ANNUAL WELLNESS VISIT, SUBSEQUENT: Primary | ICD-10-CM

## 2018-03-12 DIAGNOSIS — E03.9 ACQUIRED HYPOTHYROIDISM: ICD-10-CM

## 2018-03-12 DIAGNOSIS — Z86.79 HISTORY OF CARDIOMYOPATHY: ICD-10-CM

## 2018-03-12 DIAGNOSIS — E78.5 HYPERLIPIDEMIA WITH TARGET LDL LESS THAN 100: ICD-10-CM

## 2018-03-12 DIAGNOSIS — M10.9 GOUT, UNSPECIFIED CAUSE, UNSPECIFIED CHRONICITY, UNSPECIFIED SITE: ICD-10-CM

## 2018-03-12 DIAGNOSIS — N18.30 CHRONIC KIDNEY DISEASE, STAGE III (MODERATE) (H): ICD-10-CM

## 2018-03-12 DIAGNOSIS — M25.572 PAIN IN JOINT INVOLVING ANKLE AND FOOT, LEFT: ICD-10-CM

## 2018-03-12 DIAGNOSIS — I10 HYPERTENSION GOAL BP (BLOOD PRESSURE) < 140/90: ICD-10-CM

## 2018-03-12 PROCEDURE — 73610 X-RAY EXAM OF ANKLE: CPT | Mod: LT

## 2018-03-12 PROCEDURE — G0438 PPPS, INITIAL VISIT: HCPCS | Performed by: INTERNAL MEDICINE

## 2018-03-12 RX ORDER — METOPROLOL TARTRATE 25 MG/1
12.5 TABLET, FILM COATED ORAL 2 TIMES DAILY
Qty: 180 TABLET | Refills: 4 | Status: SHIPPED | OUTPATIENT
Start: 2018-03-12 | End: 2019-03-22

## 2018-03-12 RX ORDER — TELMISARTAN 80 MG/1
80 TABLET ORAL DAILY
Qty: 90 TABLET | Refills: 3 | Status: SHIPPED | OUTPATIENT
Start: 2018-03-12 | End: 2019-03-22

## 2018-03-12 RX ORDER — ATORVASTATIN CALCIUM 20 MG/1
20 TABLET, FILM COATED ORAL DAILY
Qty: 90 TABLET | Refills: 4 | Status: SHIPPED | OUTPATIENT
Start: 2018-03-12 | End: 2019-03-22

## 2018-03-12 RX ORDER — ALLOPURINOL 300 MG/1
300 TABLET ORAL DAILY
Qty: 90 TABLET | Refills: 4 | Status: SHIPPED | OUTPATIENT
Start: 2018-03-12 | End: 2019-03-22

## 2018-03-12 RX ORDER — LEVOTHYROXINE SODIUM 137 UG/1
TABLET ORAL
Qty: 90 TABLET | Refills: 4 | Status: SHIPPED | OUTPATIENT
Start: 2018-03-12 | End: 2019-03-22

## 2018-03-12 ASSESSMENT — ACTIVITIES OF DAILY LIVING (ADL): CURRENT_FUNCTION: NO ASSISTANCE NEEDED

## 2018-03-12 NOTE — PROGRESS NOTES
INTERNAL MEDICINE  SUBJECTIVE:   Wei Kline is a 68 year old male who presents for Preventive Visit.  Are you in the first 12 months of your Medicare coverage?  No    Physical   Annual:     Getting at least 3 servings of Calcium per day::  Yes    Bi-annual eye exam::  Yes    Dental care twice a year::  Yes    Sleep apnea or symptoms of sleep apnea::  Daytime drowsiness and Sleep apnea    Diet::  Regular (no restrictions)    Frequency of exercise::  None    Taking medications regularly::  Yes    Medication side effects::  None    Additional concerns today::  No    Ability to successfully perform activities of daily living: no assistance needed  Home Safety:  No safety concerns identified  Hearing Impairment: no hearing concerns        Fall risk:  Fallen 2 or more times in the past year?: No  Any fall with injury in the past year?: No    COGNITIVE SCREEN  1) Repeat 3 items (Banana, Sunrise, Chair)    2) Clock draw: NORMAL  3) 3 item recall: Recalls 3 objects  Results: 3 items recalled: COGNITIVE IMPAIRMENT LESS LIKELY  Mini-CogTM Copyright GORGE Lawson. Licensed by the author for use in [facility] [employer]; reprinted with permission (robb@[facility]). All rights reserved.        Left ankle - Last summer going up/down the ladder his right knee was bothersome. He is not doing that anymore and it feels better. Now his left ankle is bothering him. If he wears his boots it does not bother him. Notes that if it gets bothersome enough he will maybe seek treatment. He has tried wearing ankle supports as well without relief. Denies trauma or injury, redness, erythema.     GOUT - Patient continues to take allopurinol unchanged. He is drinking beer once and awhile. In the summer he drinks more.    Flonase use - He has not taken Flonase in awhile. He uses it as needed with cold symptoms.     Weight - He does not know why his weight is climbing. He is always busy and out doing activity. Patient feels that he eats less at meals and  such. Breakfast he has a few pieces of toast and a large glass or two of orange juice. Late lunch and rare snacking in the evenings. He does eat chocolate covered nuts here and here.    Denies chest pain or constipation.     Wt Readings from Last 5 Encounters:   03/12/18 (!) 137 kg (302 lb)   03/03/17 131.5 kg (290 lb)   02/07/17 131.5 kg (290 lb)   03/01/16 130.2 kg (287 lb)   01/16/15 129.5 kg (285 lb 8 oz)     Reviewed and updated as needed this visit by clinical staff  Tobacco  Allergies  Meds  Med Hx  Surg Hx  Fam Hx  Soc Hx      Reviewed and updated as needed this visit by Provider        Social History   Substance Use Topics     Smoking status: Former Smoker     Packs/day: 0.50     Years: 5.00     Types: Cigarettes     Start date: 1/16/1955     Quit date: 1/16/1960     Smokeless tobacco: Never Used     Alcohol use Yes      Comment: occasional       No flowsheet data found.    Today's PHQ-2 Score:   PHQ-2 ( 1999 [employer]) 3/9/2018   Q1: Little interest or pleasure in doing things 0   Q2: Feeling down, depressed or hopeless 0   PHQ-2 Score 0   Q1: Little interest or pleasure in doing things Not at all   Q2: Feeling down, depressed or hopeless Not at all   PHQ-2 Score 0     Do you feel safe in your environment - Yes    Do you have a Health Care Directive?: Yes: Patient states has Advance Directive and will bring in a copy to clinic.    Current providers sharing in care for this patient include:   Patient Care Team:  Geno Mcclelland MD as PCP - General (Internal Medicine)    The following health maintenance items are reviewed in Epic and correct as of today:  Health Maintenance   Topic Date Due     FALL RISK ASSESSMENT  03/03/2018     INFLUENZA VACCINE (SYSTEM ASSIGNED)  09/01/2018     TETANUS IMMUNIZATION (SYSTEM ASSIGNED)  09/04/2019     COLON CANCER SCREEN (SYSTEM ASSIGNED)  04/30/2020     LIPID SCREEN Q5 YR MALE (SYSTEM ASSIGNED)  03/01/2023     ADVANCE DIRECTIVE PLANNING Q5 YRS  03/12/2023      "PNEUMOCOCCAL  Completed     AORTIC ANEURYSM SCREENING (SYSTEM ASSIGNED)  Completed     HEPATITIS C SCREENING  Addressed     Labs reviewed in EPIC    Pneumonia Vaccine: COMPLETED    Review of Systems   ROS: 10 point ROS neg other than the symptoms noted above in the HPI.    This document serves as a record of the services and decisions personally performed and made by Geno Mcclelland MD. It was created on his/her behalf by Yodit Garrett, trained medical scribe. The creation of this document is based the provider's statements to the medical scribes.    Scribe Yodit Garrett 2:05 PM, March 12, 2018  OBJECTIVE:   /78  Pulse 77  Temp 96.9  F (36.1  C) (Oral)  Resp 16  Ht 1.803 m (5' 11\")  Wt (!) 137 kg (302 lb)  SpO2 96%  BMI 42.12 kg/m2 Estimated body mass index is 42.12 kg/(m^2) as calculated from the following:    Height as of this encounter: 1.803 m (5' 11\").    Weight as of this encounter: 137 kg (302 lb).  Physical Exam  GENERAL: alert and no distress  EYES: Eyes grossly normal to inspection, PERRL and conjunctivae and sclerae normal  HENT: ear canals and TM's normal, nose and mouth without ulcers or lesions  NECK: no adenopathy, no asymmetry, masses, or scars and thyroid normal to palpation  RESP: lungs clear to auscultation - no rales, rhonchi or wheezes  CV: regular rate and rhythm, normal S1 S2, no S3 or S4, no murmur, click or rub, no peripheral edema and peripheral pulses strong  ABDOMEN: soft, nontender, no hepatosplenomegaly, no masses and bowel sounds normal  MS: no gross musculoskeletal defects noted, synovial swelling in the left ankle, pain to palpation over the lateral malleolus   SKIN: no suspicious lesions or rashes  NEURO: Normal strength and tone, mentation intact and speech normal  PSYCH: mentation appears normal, affect normal/bright  ASSESSMENT / PLAN:   1. Medicare annual wellness visit, subsequent  Colonoscopy due 2020. Health maintenance utd     2. Acquired " "hypothyroidism  Stable. Continues on levothyroxine 137 mcg daily.  - levothyroxine (SYNTHROID/LEVOTHROID) 137 MCG tablet; TAKE 1 TABLET (137 MCG) BY MOUTH DAILY  Dispense: 90 tablet; Refill: 4    3. Hypertension goal BP (blood pressure) < 140/90  Controlled. The current medical regimen is effective;  continue present plan and medications.  - telmisartan (MICARDIS) 80 MG tablet; Take 1 tablet (80 mg) by mouth daily  Dispense: 90 tablet; Refill: 3  - metoprolol tartrate (LOPRESSOR) 25 MG tablet; Take 0.5 tablets (12.5 mg) by mouth 2 times daily  Dispense: 180 tablet; Refill: 4    4. Hyperlipidemia with target LDL less than 100  Stable. Continues on atorvastatin 20 mg daily.  - atorvastatin (LIPITOR) 20 MG tablet; Take 1 tablet (20 mg) by mouth daily  Dispense: 90 tablet; Refill: 4    5. LILLY (obstructive sleep apnea)  Stable.    6. History of cardiomyopathy  Stable. Followed by Cardiology.     7. Gout, unspecified cause, unspecified chronicity, unspecified site  Stable. Provided him with diet guidelines to prevent GOUT. The current medical regimen is effective;  continue present plan and medications.  - allopurinol (ZYLOPRIM) 300 MG tablet; Take 1 tablet (300 mg) by mouth daily  Dispense: 90 tablet; Refill: 4    8. Kidney stones  Stable.    9. Chronic kidney disease, stage III (moderate)  Stable.     10. Pain in joint involving ankle and foot, left  Will check an XR today. Referral to Ortho if needed.  No evidence of gout today  - XR Ankle Left G/E 3 Views      End of Life Planning:  Patient currently has an advanced directive: Yes.  Practitioner is supportive of decision.    COUNSELING:  Reviewed preventive health counseling, as reflected in patient instructions       Regular exercise       Healthy diet/nutrition  Estimated body mass index is 42.12 kg/(m^2) as calculated from the following:    Height as of this encounter: 1.803 m (5' 11\").    Weight as of this encounter: 137 kg (302 lb).  Weight management plan: " Discussed healthy diet and exercise guidelines and patient will follow up in 12 months in clinic to re-evaluate.   reports that he quit smoking about 58 years ago. His smoking use included Cigarettes. He started smoking about 63 years ago. He has a 2.50 pack-year smoking history. He has never used smokeless tobacco.    Appropriate preventive services were discussed with this patient, including applicable screening as appropriate for cardiovascular disease, diabetes, osteopenia/osteoporosis, and glaucoma.  As appropriate for age/gender, discussed screening for colorectal cancer, prostate cancer, breast cancer, and cervical cancer. Checklist reviewing preventive services available has been given to the patient.    Reviewed patients plan of care and provided an AVS. The Basic Care Plan (routine screening as documented in Health Maintenance) for Wei meets the Care Plan requirement. This Care Plan has been established and reviewed with the Patient.    Counseling Resources:  ATP IV Guidelines  Pooled Cohorts Equation Calculator  Breast Cancer Risk Calculator  FRAX Risk Assessment  ICSI Preventive Guidelines  Dietary Guidelines for Americans, 2010  [employer]'s MyPlate  ASA Prophylaxis  Lung CA Screening      Patient Instructions     [facility]    If you have any questions regarding to your visit please contact your care team:     Team Pink:   Clinic Hours Telephone Number   Internal Medicine:  Dr. Geno Thomas, NP       7am-7pm  Monday - Thursday   7am-5pm  Fridays  [phone]  (Appointment scheduling available 24/7)    Questions about your visit?  Team Line  [phone]   Urgent Care - [place] - 11am-9pm Monday-Friday Saturday-Sunday- 9am-5pm   [place] - 5pm-9pm Monday-Friday Saturday-Sunday- 9am-5pm  [phone] - [place]  [phone] - [place]       What options do I have for visits at the clinic other than the traditional office  visit?  To expand how we care for you, many of our providers are utilizing electronic visits (e-visits) and telephone visits, when medically appropriate, for interactions with their patients rather than a visit in the clinic.   We also offer nurse visits for many medical concerns. Just like any other service, we will bill your insurance company for this type of visit based on time spent on the phone with your provider. Not all insurance companies cover these visits. Please check with your medical insurance if this type of visit is covered. You will be responsible for any charges that are not paid by your insurance.      E-visits via [employer]:  generally incur a $35.00 fee.  Telephone visits:  Time spent on the phone: *charged based on time that is spent on the phone in increments of 10 minutes. Estimated cost:   5-10 mins $30.00   11-20 mins. $59.00   21-30 mins. $85.00   Use [employer] (secure email communication and access to your chart) to send your primary care provider a message or make an appointment. Ask someone on your Team how to sign up for [employer].    For a Price Quote for your services, please call our [facility] Line at [phone].    As always, Thank you for trusting us with your health care needs  Axel Stewart    Complete your Advance Directive at your leisure.    If you ever want to you can send me pictures of what you are eating or food log for a few days on MyFitness Pal or Lose It.   Make sure half your plate is non starchy vegetables at lunch/dinner.     Eating to Prevent Gout  Gout is a painful form of arthritis caused by an excess of uric acid. This is a waste product made by the body. It builds up in the body and forms crystals that collect in the joints, bringing on a gout attack. Alcohol and certain foods can trigger a gout attack. Below are some guidelines for changing your diet to help you manage gout. Your healthcare provider can work with you to determine the best eating plan for  you. Know that diet is only one part of managing gout. Take your medicines as prescribed and follow the other guidelines your healthcare provider has given you.  Foods to limit  Eating too many foods containing purines may increase the levels of uric acid in your body and increase your risk for a gout attack. It may be best to limit these high-purine foods:    Alcohol (beer, red wine). You may be told to avoid alcohol completely.    Certain fish (anchovies, sardines, fish roes, herring, tuna, mussels, codfish, scallops, trout, and antoinette)    Certain meats (red meat, processed meat, hawley, turkey, wild game, and goose)    Sauces and gravies made with meat    Organ meats (such as liver, kidneys, sweetbreads, and tripe)    Legumes (such as dried beans, peas)    Mushrooms, spinach, asparagus, and cauliflower    Yeast and yeast extract supplements  Foods to try  Some foods may be helpful for people with gout. You may want to try adding some of the following foods to your diet:    Dark berries: These include blueberries, blackberries, and cherries. These berries contain chemicals that may lower uric acid.    Tofu: Tofu, which is made from soy, is a good source of protein. Studies have shown that it may be a better choice than meat for people with gout.    Omega fatty acids: These acids are found in fatty fish (such as salmon), certain oils (such as flax, olive, or nut oils), or nuts. They may help prevent inflammation due to gout.  The following guidelines are recommended by the American Medical Association for people with gout. Your diet should be:    High in fiber, whole grains, fruits, and vegetables.    Low in protein (15% of calories should come from protein. Choose lean sources such as soy, lean meats, and poultry).    Low in fat (no more than 30% of calories should come from fat, with only 10% coming from animal fat).   Date Last Reviewed: 6/17/2015 2000-2017 [employer]. [address] [place] [address],  ANTONIO Aguilar [address]1. All rights reserved. This information is not intended as a substitute for professional medical care. Always follow your healthcare professional's instructions.          I spent 30 minutes of time with the patient and >50% of it was in education and counseling regarding preventive health.     The information in this document, created by the medical scribe for me, accurately reflects the services I personally performed and the decisions made by me. I have reviewed and approved this document for accuracy prior to leaving the patient care area.  Geno Mcclelland MD  2:05 PM, 03/12/18    Geno Mcclelland MD  [facility]    Start 2:04 PM  End 2:34 PM

## 2018-03-12 NOTE — MR AVS SNAPSHOT
After Visit Summary   3/12/2018    Wei Kline    MRN: [id number]           Patient Information     Date Of Birth          1950        Visit Information        Provider Department      3/12/2018 2:00 PM Geno Mcclelland MD [facility]        Today's Diagnoses     Medicare annual wellness visit, subsequent    -  1    Acquired hypothyroidism        Hypertension goal BP (blood pressure) < 140/90        Hyperlipidemia with target LDL less than 100        LILLY (obstructive sleep apnea)        History of cardiomyopathy        Gout, unspecified cause, unspecified chronicity, unspecified site        Kidney stones        Chronic kidney disease, stage III (moderate)          Care Instructions    [facility]    If you have any questions regarding to your visit please contact your care team:     Team Pink:   Clinic Hours Telephone Number   Internal Medicine:  Dr. Geno Thomas, NP       7am-7pm  Monday - Thursday   7am-5pm  Fridays  [phone]  (Appointment scheduling available 24/7)    Questions about your visit?  Team Line  [phone]   Urgent Care - [place] - 11am-9pm Monday-Friday Saturday-Sunday- 9am-5pm   [place] - 5pm-9pm Monday-Friday Saturday-Sunday- 9am-5pm  [phone] - [place]   [phone] - [place]       What options do I have for visits at the clinic other than the traditional office visit?  To expand how we care for you, many of our providers are utilizing electronic visits (e-visits) and telephone visits, when medically appropriate, for interactions with their patients rather than a visit in the clinic.   We also offer nurse visits for many medical concerns. Just like any other service, we will bill your insurance company for this type of visit based on time spent on the phone with your provider. Not all insurance companies cover these visits. Please check with your medical insurance  if this type of visit is covered. You will be responsible for any charges that are not paid by your insurance.      E-visits via [facility]:  generally incur a $35.00 fee.  Telephone visits:  Time spent on the phone: *charged based on time that is spent on the phone in increments of 10 minutes. Estimated cost:   5-10 mins $30.00   11-20 mins. $59.00   21-30 mins. $85.00   Use [facility] (secure email communication and access to your chart) to send your primary care provider a message or make an appointment. Ask someone on your Team how to sign up for [facility] [employer].    For a Price Quote for your services, please call our Quotefish Line at [phone].    As always, Thank you for trusting us with your health care needs  Axel Stewart    Complete your Advance Directive at your leisure.    If you ever want to you can send me pictures of what you are eating or food log for a few days on My[employer]De[employer] Pal or Lose It.   Make sure half your plate is non starchy vegetables at lunch/dinner.     Eating to Prevent Gout  Gout is a painful form of arthritis caused by an excess of uric acid. This is a waste product made by the body. It builds up in the body and forms crystals that collect in the joints, bringing on a gout attack. Alcohol and certain foods can trigger a gout attack. Below are some guidelines for changing your diet to help you manage gout. Your healthcare provider can work with you to determine the best eating plan for you. Know that diet is only one part of managing gout. Take your medicines as prescribed and follow the other guidelines your healthcare provider has given you.  Foods to limit  Eating too many foods containing purines may increase the levels of uric acid in your body and increase your risk for a gout attack. It may be best to limit these high-purine foods:    Alcohol (beer, red wine). You may be told to avoid alcohol completely.    Certain fish (anchovies, sardines, fish roes, herring, tuna, mussels,  codfish, scallops, trout, and antoinette)    Certain meats (red meat, processed meat, hawley, turkey, wild game, and goose)    Sauces and gravies made with meat    Organ meats (such as liver, kidneys, sweetbreads, and tripe)    Legumes (such as dried beans, peas)    Mushrooms, spinach, asparagus, and cauliflower    Yeast and yeast extract supplements  Foods to try  Some foods may be helpful for people with gout. You may want to try adding some of the following foods to your diet:    Dark berries: These include blueberries, blackberries, and cherries. These berries contain chemicals that may lower uric acid.    Tofu: Tofu, which is made from soy, is a good source of protein. Studies have shown that it may be a better choice than meat for people with gout.    Omega fatty acids: These acids are found in fatty fish (such as salmon), certain oils (such as flax, olive, or nut oils), or nuts. They may help prevent inflammation due to gout.  The following guidelines are recommended by the American Medical Association for people with gout. Your diet should be:    High in fiber, whole grains, fruits, and vegetables.    Low in protein (15% of calories should come from protein. Choose lean sources such as soy, lean meats, and poultry).    Low in fat (no more than 30% of calories should come from fat, with only 10% coming from animal fat).   Date Last Reviewed: 6/17/2015 2000-2017 [employer]. [address] [place] [address], [place] [address]. All rights reserved. This information is not intended as a substitute for professional medical care. Always follow your healthcare professional's instructions.                Follow-ups after your visit        Follow-up notes from your care team     Return in about 1 year (around 3/12/2019).      Who to contact     If you have questions or need follow up information about today's clinic visit or your schedule please [facility] at [phone].  Normal or  "non-critical lab and imaging results will be communicated to you by MyChart, letter or phone within 4 business days after the clinic has received the results. If you do not hear from us within 7 days, please contact the clinic through [facility] or phone. If you have a critical or abnormal lab result, we will notify you by phone as soon as possible.  Submit refill requests through [employer] or call your pharmacy and they will forward the refill request to us. Please allow 3 business days for your refill to be completed.          Additional Information About Your Visit        [employer] Information     [employer] gives you secure access to your electronic health record. If you see a primary care provider, you can also send messages to your care team and make appointments. If you have questions, please call your primary care clinic.  If you do not have a primary care provider, please call [phone] and they will assist you.        Care EveryWhere ID     This is your Care EveryWhere ID. This could be used by other organizations to access your [facility] medical records  [id number]        Your Vitals Were     Pulse Temperature Respirations Height Pulse Oximetry BMI (Body Mass Index)    77 96.9  F (36.1  C) (Oral) 16 5' 11\" (1.803 m) 96% 42.12 kg/m2       Blood Pressure from Last 3 Encounters:   03/12/18 130/78   03/03/17 118/70   03/01/16 124/78    Weight from Last 3 Encounters:   03/12/18 (!) 302 lb (137 kg)   03/03/17 290 lb (131.5 kg)   02/07/17 290 lb (131.5 kg)              Today, you had the following     No orders found for display         Today's Medication Changes          These changes are accurate as of 3/12/18  2:35 PM.  If you have any questions, ask your nurse or doctor.               These medicines have changed or have updated prescriptions.        Dose/Directions    * MICARDIS 80 MG tablet   This may have changed:  Another medication with the same name was changed. Make sure you understand how and when to take " each.   Used for:  Hypertension goal BP (blood pressure) < 140/90   Generic drug:  telmisartan   Changed by:  Geno Mcclelland MD        TAKE 1 TABLET DAILY   Quantity:  30 tablet   Refills:  3       * telmisartan 80 MG tablet   Commonly known as:  MICARDIS   This may have changed:  See the new instructions.   Used for:  Hypertension goal BP (blood pressure) < 140/90   Changed by:  Geno Mcclelland MD        Dose:  80 mg   Take 1 tablet (80 mg) by mouth daily   Quantity:  90 tablet   Refills:  3       * Notice:  This list has 2 medication(s) that are the same as other medications prescribed for you. Read the directions carefully, and ask your doctor or other care provider to review them with you.      Stop taking these medicines if you haven't already. Please contact your care team if you have questions.     fluticasone 50 MCG/ACT spray   Commonly known as:  FLONASE   Stopped by:  Geno Mcclelland MD                Where to get your medicines      These medications were sent to [id number] - [place] - [address] [address]  [address] [facility] [place] [address]    Hours:  test fax successful 4/5/04 kr Phone:  [phone]     allopurinol 300 MG tablet    atorvastatin 20 MG tablet    levothyroxine 137 MCG tablet    metoprolol tartrate 25 MG tablet    telmisartan 80 MG tablet                Primary Care Provider Office Phone # Fax #    Geno Mcclelland -[phone]       [address] [facility] [address]        Equal Access to Services     [facility] AH: Hadii aad ku hadasho Soomaali, waaxda luqadaha, qaybta kaalmada adeegyada, waxay erich price . So [facility] [phone].    ATENCIÓN: Si habla español, tiene a woo disposición servicios gratuitos de asistencia lingüística. Llame al [phone].    We comply with applicable federal civil rights laws and [place] laws. We do not discriminate on the basis of race, color, national origin, age, disability,  sex, sexual orientation, or gender identity.            Thank you!     Thank you for choosing [facility]  for your care. Our goal is always to provide you with excellent care. Hearing back from our patients is one way we can continue to improve our services. Please take a few minutes to complete the written survey that you may receive in the mail after your visit with us. Thank you!             Your Updated Medication List - Protect others around you: Learn how to safely use, store and throw away your medicines at www.disposemymeds.org.          This list is accurate as of 3/12/18  2:35 PM.  Always use your most recent med list.                   Brand Name Dispense Instructions for use Diagnosis    allopurinol 300 MG tablet    ZYLOPRIM    90 tablet    Take 1 tablet (300 mg) by mouth daily    Gout, unspecified cause, unspecified chronicity, unspecified site       aspirin 81 MG tablet      Take 81 mg by mouth daily        atorvastatin 20 MG tablet    LIPITOR    90 tablet    Take 1 tablet (20 mg) by mouth daily    Hyperlipidemia with target LDL less than 100       FOLIC ACID PO      Take 1 mg by mouth daily With DHA        levothyroxine 137 MCG tablet    SYNTHROID/LEVOTHROID    90 tablet    TAKE 1 TABLET (137 MCG) BY MOUTH DAILY    Acquired hypothyroidism       metoprolol tartrate 25 MG tablet    LOPRESSOR    180 tablet    Take 0.5 tablets (12.5 mg) by mouth 2 times daily    Hypertension goal BP (blood pressure) < 140/90       * MICARDIS 80 MG tablet   Generic drug:  telmisartan     30 tablet    TAKE 1 TABLET DAILY    Hypertension goal BP (blood pressure) < 140/90       * telmisartan 80 MG tablet    MICARDIS    90 tablet    Take 1 tablet (80 mg) by mouth daily    Hypertension goal BP (blood pressure) < 140/90       MULTI VITAMIN DAILY PO      Take 1 tablet by mouth daily        order for DME     1 each    Equipment being ordered: Cpap, mask, tubing, and all associated supplies    LILLY (obstructive sleep  apnea)       vitamin D 2000 UNITS Caps      Take 4,000 mg by mouth daily        * Notice:  This list has 2 medication(s) that are the same as other medications prescribed for you. Read the directions carefully, and ask your doctor or other care provider to review them with you.

## 2018-03-16 ENCOUNTER — MYC MEDICAL ADVICE (OUTPATIENT)
Dept: INTERNAL MEDICINE | Facility: CLINIC | Age: 68
End: 2018-03-16

## 2018-03-16 DIAGNOSIS — I10 HYPERTENSION GOAL BP (BLOOD PRESSURE) < 140/90: ICD-10-CM

## 2018-03-16 RX ORDER — TELMISARTAN 80 MG/1
80 TABLET ORAL DAILY
Qty: 30 TABLET | Refills: 3 | Status: SHIPPED | OUTPATIENT
Start: 2018-03-16 | End: 2019-03-22

## 2018-03-16 NOTE — TELEPHONE ENCOUNTER
Routing refill request to provider for review/approval because:  Drug not on the [facility] refill protocol       Requested Prescriptions   Pending Prescriptions Disp Refills     telmisartan (MICARDIS) 80 MG tablet 30 tablet 3     Sig: Take 1 tablet (80 mg) by mouth daily    There is no refill protocol information for this order        Ivana Oconnell RN - BC

## 2018-03-26 ENCOUNTER — TRANSFERRED RECORDS (OUTPATIENT)
Dept: HEALTH INFORMATION MANAGEMENT | Facility: CLINIC | Age: 68
End: 2018-03-26

## 2018-03-26 LAB
ALT SERPL-CCNC: 36 IU/L (ref 8–45)
AST SERPL-CCNC: 55 IU/L (ref 2–40)
CREAT SERPL-MCNC: 1.42 MG/DL (ref 0.72–1.25)
GFR SERPL CREATININE-BSD FRML MDRD: 49 ML/MIN/1.73M2
GLUCOSE SERPL-MCNC: 107 MG/DL (ref 65–100)
POTASSIUM SERPL-SCNC: 4.6 MMOL/L (ref 3.5–5)
TSH SERPL-ACNC: 1.01 UIU/ML (ref 0.35–4.94)

## 2018-03-27 ENCOUNTER — TELEPHONE (OUTPATIENT)
Dept: CARE COORDINATION | Facility: CLINIC | Age: 68
End: 2018-03-27

## 2018-03-27 DIAGNOSIS — Z71.89 COMPLEX CARE COORDINATION: Primary | ICD-10-CM

## 2018-03-27 NOTE — TELEPHONE ENCOUNTER
DC'd from [facility] on 3/26/18 to Home self care   Primary Problem: ED discharge  LACE: 54 Moderate/High

## 2018-03-27 NOTE — PROGRESS NOTES
SUBJECTIVE:   Wei Kline is a 68 year old male who presents to clinic today for the following health issues: Presents with his wife.     ER Visit-- He states that he went to the ER due to chest pain and right abdominal pain. Chief worry was if he was having a possible heart attack . His abdominal US showed some gall stones with no evidence of biliary obstruction. He also had an EKG which was normal. He had some Tums chewable tablets when he got to the ER which did not provide him any benefit. He had a thoroughly adequate workup and a rule out for myocardial infarction including troponins and electrocardiograms. He reports that he followed up with his cardiologist after his ER visit and he was told that he could not get a good US at the ER due to him being overweight. He states that Dr. Mcclelland has discussed the option of starting him on weight loss medications in the past, but has decided not to, due to his other health issues. He notes that he is currently feeling better. He denies any SOB. Primarily today we reviewed his emergency room evaluation and concepts with atypical chest pain as well as discussion about his abdominal ultrasound .    Wt Readings from Last 5 Encounters:   03/30/18 136.1 kg (300 lb)   03/12/18 (!) 137 kg (302 lb)   03/03/17 131.5 kg (290 lb)   02/07/17 131.5 kg (290 lb)   03/01/16 130.2 kg (287 lb)        Thyroid-- He also has a history of Hyperthyroidism and was treated with radiation. After his radiation he had hypothyroidism and he currently takes Levothyroxine.     Additional Information-- Patient has a cardiologist because he had health issues in the past which led to him getting an angiogram. His angiogram was normal.     ED/UC Followup:    Facility:  [facility]  Date of visit: 03/26/2018  Reason for visit: abdominal pain, chest pain  Current Status: feeling better, no new episodes      Hospital Note    ED Course:  I reviewed the patient's past medical history, charts, and nursing  notes. I examined the patient and discussed the plan of care which was carried out as above.     The findings were discussed with the patient and his wife. The patient discharged home with discharge instructions. Reasons to return and the importance of close follow-up were reviewed.     Impression and Plan:  Wei Kline is a 68 y.o. male here with epigastric abdominal pain. DDx includes PUD, gastritis, gastroesophageal reflux, acute cholecystitis, biliary colic, pancreatitis, or even an atypical presentation of cardiac ischemia. Based on his history and exam the most likely etiology of the patient's pain is gastritis or esophageal reflux.    RUQ US does show evidence of gallstones but does not show any signs of biliary obstruction. No signs of acute cholecystitis, as LFT's are not significantly abnormal, bilirubin is normal, and the RUQ US is without GB wall thickening or pericholecystic fluid.    Screening Lipase was done to assess for pancreatitis to be source of the epigastric pain ans is normal.    I do not believe his pain to be atypical presentation of cardiac ischemia. The patient's EKGs are without signs of ischemia and serial troponin testing is negative.    The patient's pain resolved without intervention while here in the ER. He now feels comfortable with discharge home. I discussed with him the evidence of cholelithiasis but we discussed that we do not see evidence of cholecystitis. I am not sure if the patient's pain was caused by GERD, gastritis, or possibly him passing a gallstone. Given the normal workup, and his resolution of symptoms, I do feel he is now stable for discharge.    A discussion was had with the patient regarding the diagnosis and treatment plan and he expressed understanding of the plan and consented to it. In addition, he will return to the emergency department if there is persistence of the symptoms, worsening of symptoms, new symptoms develop or if there are any concerns. He  agrees to follow up with his primary care physician and if unable to do so, will return to the emergency department for reevaluation. All questions were answered.          Problem list and histories reviewed & adjusted, as indicated.  Additional history: as documented    Patient Active Problem List   Diagnosis     Gout     Hypothyroidism     History of Lyme disease     Hyperlipidemia with target LDL less than 100     Hypertension goal BP (blood pressure) < 140/90     History of cardiomyopathy     LILLY (obstructive sleep apnea)     Kidney stones     Chronic kidney disease, stage III (moderate)     Gout, unspecified     Past Surgical History:   Procedure Laterality Date     arthroscopic knee surgery  1990's    bilateral knees (no replacement)     TONSILLECTOMY         Social History   Substance Use Topics     Smoking status: Former Smoker     Packs/day: 0.50     Years: 5.00     Types: Cigarettes     Start date: 1/16/1955     Quit date: 1/16/1960     Smokeless tobacco: Never Used     Alcohol use Yes      Comment: occasional     Family History   Problem Relation Age of Onset     Breast Cancer Mother      CANCER Father      CEREBROVASCULAR DISEASE Paternal Grandmother      Cancer - colorectal Paternal Grandfather      Cancer - colorectal Brother      DIABETES Brother      Peptic Ulcer Disease Brother      Lupus Sister      Hypertension Son      DIABETES Brother      Hypertension Son          Current Outpatient Prescriptions   Medication Sig Dispense Refill     telmisartan (MICARDIS) 80 MG tablet Take 1 tablet (80 mg) by mouth daily 30 tablet 3     telmisartan (MICARDIS) 80 MG tablet Take 1 tablet (80 mg) by mouth daily 90 tablet 3     atorvastatin (LIPITOR) 20 MG tablet Take 1 tablet (20 mg) by mouth daily 90 tablet 4     levothyroxine (SYNTHROID/LEVOTHROID) 137 MCG tablet TAKE 1 TABLET (137 MCG) BY MOUTH DAILY 90 tablet 4     metoprolol tartrate (LOPRESSOR) 25 MG tablet Take 0.5 tablets (12.5 mg) by mouth 2 times daily  180 tablet 4     allopurinol (ZYLOPRIM) 300 MG tablet Take 1 tablet (300 mg) by mouth daily 90 tablet 4     order for DME Equipment being ordered: Cpap, mask, tubing, and all associated supplies 1 each 0     Cholecalciferol (VITAMIN D) 2000 UNITS CAPS Take 4,000 mg by mouth daily       Multiple Vitamin (MULTI VITAMIN DAILY PO) Take 1 tablet by mouth daily       FOLIC ACID PO Take 1 mg by mouth daily With DHA       aspirin 81 MG tablet Take 81 mg by mouth daily       Labs reviewed in EPIC    Reviewed and updated as needed this visit by clinical staff  Tobacco  Allergies  Meds  Med Hx  Surg Hx  Fam Hx  Soc Hx      Reviewed and updated as needed this visit by Provider         ROS:  Constitutional, HEENT, cardiovascular, pulmonary, GI, , musculoskeletal, neuro, skin, endocrine and psych systems are negative, except as otherwise noted.    This document serves as a record of the services and decisions personally performed and made by Benjamin Heredia MD. It was created on their behalf by Prudencio Khan, a trained medical scribe. The creation of this document is based the provider's statements to the medical scribe.  Prudencio Khan  March 30, 2018 11:04 AM    OBJECTIVE:     /72  Pulse 66  Temp 97.4  F (36.3  C) (Oral)  Resp 16  Wt 136.1 kg (300 lb)  SpO2 96%  BMI 41.84 kg/m2  Body mass index is 41.84 kg/(m^2).  GENERAL: morbidly obese, alert and no distress  EYES: Eyes grossly normal to inspection, EOMI, conjunctivae and sclerae normal  RESP: lungs clear to auscultation - no rales, rhonchi or wheezes  CV: regular rate and rhythm, normal S1 S2, no S3 or S4, no murmur, click or rub, no peripheral edema and peripheral pulses strong  ABDOMEN: soft, nontender, no hepatosplenomegaly, no masses and bowel sounds normal  MS: no gross musculoskeletal defects noted, no edema  SKIN: no suspicious lesions or rashes to visible skin   NEURO: Mentation intact and speech normal  PSYCH: mentation appears normal, affect  normal/bright    Diagnostic Test Results:  Results for orders placed or performed in visit on 03/12/18   XR Ankle Left G/E 3 Views    Narrative    LEFT ANKLE THREE OR MORE VIEWS   3/12/2018 2:48 PM     HISTORY: Pain in joint involving ankle and foot, left.    COMPARISON: None.    FINDINGS: Soft tissue swelling left ankle especially laterally. Tiny  osseous densities adjacent to the medial malleolus likely result of  old injury. Similar tiny corticated osseous densities adjacent to the  lateral malleolus do not appear acute. Calcaneal spurs. Ankle mortise  is mildly widened medially.      Impression    IMPRESSION:   1. Soft tissue swelling and small old fracture fragments adjacent to  the medial and lateral malleoli. Nothing to suggest acute fracture.  2. Ankle mortise is slightly widened medially.     ALESSIO GAR MD       ASSESSMENT/PLAN:     (R07.89) Atypical chest pain  (primary encounter diagnosis)  Comment: the great bulk of this appointment was spent in review of possible causes of chest pain, it would appear that we are just not positive of this patients cause of his chest pain. In my heart of hearts I continue to suspect gastrointestinal-mediated non-cardiac chest pain but we just don't know. The important thing is that we have no medical justification to recommend additional tests at this point.   Plan: a posture of observation  Is reviewed. Further follow up with primary care physician to discuss symptoms of chest pain continues to be a concern    Patient Instructions     [facility]    If you have any questions regarding to your visit please contact your care team:     Team Pink:   Clinic Hours Telephone Number   Internal Medicine:  Dr. Geno Thomas NP       7am-7pm  Monday - Thursday   7am-5pm  Fridays  [phone]  (Appointment scheduling available 24/7)    Questions about your visit?  Team Line  [phone]   Urgent Care - [place] and  [place] - 11am-9pm Monday-Friday Saturday-Sunday- 9am-5pm   [place] - 5pm-9pm Monday-Friday Saturday-Sunday- 9am-5pm  [phone] - [place]  [phone] - [place]       What options do I have for visits at the clinic other than the traditional office visit?  To expand how we care for you, many of our providers are utilizing electronic visits (e-visits) and telephone visits, when medically appropriate, for interactions with their patients rather than a visit in the clinic.   We also offer nurse visits for many medical concerns. Just like any other service, we will bill your insurance company for this type of visit based on time spent on the phone with your provider. Not all insurance companies cover these visits. Please check with your medical insurance if this type of visit is covered. You will be responsible for any charges that are not paid by your insurance.      E-visits via [employer]:  generally incur a $35.00 fee.  Telephone visits:  Time spent on the phone: *charged based on time that is spent on the phone in increments of 10 minutes. Estimated cost:   5-10 mins $30.00   11-20 mins. $59.00   21-30 mins. $85.00   Use [employer] (secure email communication and access to your chart) to send your primary care provider a message or make an appointment. Ask someone on your Team how to sign up for [employer].    For a Price Quote for your services, please call our Consumer Price Line at [phone].    As always, Thank you for trusting us with your health care needs!    Darlene TEIXEIRA CMA ([facility])        The information in this document, created by the medical scribe for me, accurately reflects the services I personally performed and the decisions made by me. I have reviewed and approved this document for accuracy.   MD Benjamin Holliday MD  [facility]

## 2018-03-28 ENCOUNTER — PATIENT OUTREACH (OUTPATIENT)
Dept: CARE COORDINATION | Facility: CLINIC | Age: 68
End: 2018-03-28

## 2018-03-28 NOTE — LETTER
[place] CARE COORDINATION  [facility]  [address] [place] [address]  [phone]    March 28, 2018    Wei Kline  [address] [place] [id number]      Dear Wei,    I am a clinic care coordinator who works with Geno Mcclelland MD at [facility]. I wanted to thank you for spending the time to talk with me.  I wanted to introduce myself and provide you with my contact information so that you can call me with questions or concerns about your health care. Below is a description of clinic care coordination and how I can further assist you.     The clinic care coordinator is a registered nurse and/or  who understand the health care system. The goal of clinic care coordination is to help you manage your health and improve access to the [facility] system in the most efficient manner. The registered nurse can assist you in meeting your health care goals by providing education, coordinating services, and strengthening the communication among your providers. The  can assist you with financial, behavioral, psychosocial, chemical dependency, counseling, and/or psychiatric resources.    Please feel free to contact me at [phone], [phone], with any questions or concerns. We at [facility] are focused on providing you with the highest-quality healthcare experience possible and that all starts with you.     Sincerely,     Hiren Ley RN  Clinic Care Coordinator    Enclosed: I have enclosed a copy of a 24 Hour Access Plan. This has helpful phone numbers for you to call when needed. Please keep this in an easy to access place to use as needed.

## 2018-03-28 NOTE — LETTER
Health Care Home - Access Care Plan    About Me  Patient Name:  Wei Kline    YOB: 1950  Age:                             68 year old   Alannah MRN:            [id number] Telephone Information:     Home Phone [phone]   Mobile [phone]       Address:    [address] [place] [address] [place] [address] Email address:  [email or web address]      Emergency Contact(s)  Name Relationship Lgl Grd Work Phone Home Phone Mobile Phone   Castro. HANNA NOVA Spouse   [phone]              Health Maintenance:      My Access Plan  Medical Emergency 911   Questions or concerns during clinic hours Primary Clinic Line, I will call the clinic directly: [facility] - [phone]   24 Hour Appointment Line [phone] or  [phone]) (toll free)   24 Hour Nurse Line [phone] (toll free)   Questions or concerns outside clinic hours 24 Hour Appointment Line, I will call the after-hours on-call line:   [facility] [phone] or [phone]) (toll-free)   Preferred Urgent Care [facility] [phone]   Preferred Hospital [facility]  [phone]   Preferred Pharmacy [id number] - [place], [place] - [address] [facility]     Behavioral Health Crisis Line The National Suicide Prevention Lifeline at 1-633.117.5079 or 911     My Care Team Members  Patient Care Team       Relationship Specialty Notifications Start End    Geno Mcclelland MD PCP - General Internal Medicine  12/23/14     Phone: [phone] Fax: [phone] [address] [facility] [address]        My Medical and Care Information  Problem List   Patient Active Problem List   Diagnosis     Gout     Hypothyroidism     History of Lyme disease     Hyperlipidemia with target LDL less than 100     Hypertension goal BP (blood pressure) < 140/90     History of cardiomyopathy     LILLY (obstructive sleep apnea)     Kidney stones     Chronic kidney disease,  stage III (moderate)     Gout, unspecified      Current Medications and Allergies:  See printed Medication Report

## 2018-03-28 NOTE — PROGRESS NOTES
"Clinic Care Coordination Contact    [place]    Referral Information:  Referral Source: [facility]         Chief Complaint   Patient presents with     Clinic Care Coordination - [facility]     Care Team        Universal Utilization:   Utilization    Last refreshed: 3/27/2018 11:48 AM:  No Show Count (past year) 0       Last refreshed: 3/27/2018 11:48 AM:  ED visits 0       Last refreshed: 3/27/2018 11:48 AM:  Hospital admissions 0          Current as of: 3/27/2018 11:48 AM             Clinical Concerns:  Current Medical Concerns:  Patient went to ED with chest pain. \"worst pain ever\"  Test were done and found no cardiac issues. Pain is now resolved and Patient reports no other s/s or concerns. Has follow up planned for this week.     Current Behavioral Concerns: none    Education Provided to patient: Encouraged follow up appointment with PCP.         Medication Management:  Patient is knowledgeable on medications and is adherent. No financial concerns reported at this time.         Plan: 1) Patient will continue to follow treatment plan as directed and follow up with PCP with concerns ongoing.   2) Care Coordination to remain available for future needs.     Hiren Ley RN  Clinic Care Coordinator  [phone] or [phone]            "

## 2018-03-30 ENCOUNTER — OFFICE VISIT (OUTPATIENT)
Dept: FAMILY MEDICINE | Facility: CLINIC | Age: 68
End: 2018-03-30
Payer: COMMERCIAL

## 2018-03-30 VITALS
TEMPERATURE: 97.4 F | SYSTOLIC BLOOD PRESSURE: 126 MMHG | HEART RATE: 66 BPM | WEIGHT: 300 LBS | RESPIRATION RATE: 16 BRPM | BODY MASS INDEX: 41.84 KG/M2 | OXYGEN SATURATION: 96 % | DIASTOLIC BLOOD PRESSURE: 72 MMHG

## 2018-03-30 DIAGNOSIS — R07.89 ATYPICAL CHEST PAIN: Primary | ICD-10-CM

## 2018-03-30 PROCEDURE — 99214 OFFICE O/P EST MOD 30 MIN: CPT | Performed by: INTERNAL MEDICINE

## 2018-03-30 NOTE — PATIENT INSTRUCTIONS
[facility]    If you have any questions regarding to your visit please contact your care team:     Team Pink:   Clinic Hours Telephone Number   Internal Medicine:  Dr. Geno Thomas NP       7am-7pm  Monday - Thursday   7am-5pm  Fridays  [phone]  (Appointment scheduling available 24/7)    Questions about your visit?  Team Line  [phone]   Urgent Care - [place] and [facility] - 11am-9pm Monday-Friday Saturday-Sunday- 9am-5pm   [place] - 5pm-9pm Monday-Friday Saturday-Sunday- 9am-5pm  [phone] - [place]   [phone] - [place]       What options do I have for visits at the clinic other than the traditional office visit?  To expand how we care for you, many of our providers are utilizing electronic visits (e-visits) and telephone visits, when medically appropriate, for interactions with their patients rather than a visit in the clinic.   We also offer nurse visits for many medical concerns. Just like any other service, we will bill your insurance company for this type of visit based on time spent on the phone with your provider. Not all insurance companies cover these visits. Please check with your medical insurance if this type of visit is covered. You will be responsible for any charges that are not paid by your insurance.      E-visits via [employer]:  generally incur a $35.00 fee.  Telephone visits:  Time spent on the phone: *charged based on time that is spent on the phone in increments of 10 minutes. Estimated cost:   5-10 mins $30.00   11-20 mins. $59.00   21-30 mins. $85.00   Use [employer] (secure email communication and access to your chart) to send your primary care provider a message or make an appointment. Ask someone on your Team how to sign up for [employer].    For a Price Quote for your services, please call our Consumer Price Line at [phone].    As always, Thank you for trusting us with your health care  needs!    Darlene TEIXEIRA CMA ([facility])

## 2018-03-30 NOTE — MR AVS SNAPSHOT
After Visit Summary   3/30/2018    Wei Kline    MRN: [id number]           Patient Information     Date Of Birth          1950        Visit Information        Provider Department      3/30/2018 11:10 AM Benjamin Heredia MD [facility] Instructions    [facility]    If you have any questions regarding to your visit please contact your care team:     Team Pink:   Clinic Hours Telephone Number   Internal Medicine:  Dr. Geno Thomas NP       7am-7pm  Monday - Thursday   7am-5pm  Fridays  [phone]  (Appointment scheduling available 24/7)    Questions about your visit?  Team Line  [phone]   Urgent Care - [place] and [place] - 11am-9pm Monday-Friday Saturday-Sunday- 9am-5pm   [place] - 5pm-9pm Monday-Friday Saturday-Sunday- 9am-5pm  [phone] - [place]   [phone] - [place]       What options do I have for visits at the clinic other than the traditional office visit?  To expand how we care for you, many of our providers are utilizing electronic visits (e-visits) and telephone visits, when medically appropriate, for interactions with their patients rather than a visit in the clinic.   We also offer nurse visits for many medical concerns. Just like any other service, we will bill your insurance company for this type of visit based on time spent on the phone with your provider. Not all insurance companies cover these visits. Please check with your medical insurance if this type of visit is covered. You will be responsible for any charges that are not paid by your insurance.      E-visits via [employer]:  generally incur a $35.00 fee.  Telephone visits:  Time spent on the phone: *charged based on time that is spent on the phone in increments of 10 minutes. Estimated cost:   5-10 mins $30.00   11-20 mins. $59.00   21-30 mins. $85.00   Use [employer] (secure email communication and access to your  chart) to send your primary care provider a message or make an appointment. Ask someone on your Team how to sign up for [employer].    For a Price Quote for your services, please call our Consumer Price Line at [phone].    As always, Thank you for trusting us with your health care needs!    Darlene TEIXEIRA CMA ([facility])            Follow-ups after your visit        Who to contact     If you have questions or need follow up information about today's clinic visit or your schedule please contact [facility] [place] directly at [phone].  Normal or non-critical lab and imaging results will be communicated to you by MyChart, letter or phone within 4 business days after the clinic has received the results. If you do not hear from us within 7 days, please contact the clinic through [employer] or phone. If you have a critical or abnormal lab result, we will notify you by phone as soon as possible.  Submit refill requests through [employer] or call your pharmacy and they will forward the refill request to us. Please allow 3 business days for your refill to be completed.          Additional Information About Your Visit        [employer] Information     [employer] gives you secure access to your electronic health record. If you see a primary care provider, you can also send messages to your care team and make appointments. If you have questions, please call your primary care clinic.  If you do not have a primary care provider, please call [phone] and they will assist you.        Care EveryWhere ID     This is your Care EveryWhere ID. This could be used by other organizations to access your [place] medical records  [id number]        Your Vitals Were     Pulse Temperature Respirations Pulse Oximetry BMI (Body Mass Index)       66 97.4  F (36.3  C) (Oral) 16 96% 41.84 kg/m2        Blood Pressure from Last 3 Encounters:   03/30/18 126/72   03/12/18 130/78   03/03/17 118/70    Weight from Last 3 Encounters:   03/30/18 300 lb (136.1  kg)   03/12/18 (!) 302 lb (137 kg)   03/03/17 290 lb (131.5 kg)              Today, you had the following     No orders found for display       Primary Care Provider Office Phone # Fax #    Geno Mcclelland [phone] [address] [facility]  [place] [address]        Equal Access to Services     [facility]: Hadii aad ku hadasho Soomaali, waaxda luqadaha, qaybta kaalmada adeegyada, waxay idiin hayaan adeeg khsofyash la'aan . So [facility] [phone].    ATENCIÓN: Si habla español, tiene a owo disposición servicios gratuitos de asistencia lingüística. [facility] [phone].    We comply with applicable federal civil rights laws and [place] laws. We do not discriminate on the basis of race, color, national origin, age, disability, sex, sexual orientation, or gender identity.            Thank you!     Thank you for choosing [facility]  for your care. Our goal is always to provide you with excellent care. Hearing back from our patients is one way we can continue to improve our services. Please take a few minutes to complete the written survey that you may receive in the mail after your visit with us. Thank you!             Your Updated Medication List - Protect others around you: Learn how to safely use, store and throw away your medicines at www.disposemymeds.org.          This list is accurate as of 3/30/18 11:19 AM.  Always use your most recent med list.                   Brand Name Dispense Instructions for use Diagnosis    allopurinol 300 MG tablet    ZYLOPRIM    90 tablet    Take 1 tablet (300 mg) by mouth daily    Gout, unspecified cause, unspecified chronicity, unspecified site       aspirin 81 MG tablet      Take 81 mg by mouth daily        atorvastatin 20 MG tablet    LIPITOR    90 tablet    Take 1 tablet (20 mg) by mouth daily    Hyperlipidemia with target LDL less than 100       FOLIC ACID PO      Take 1 mg by mouth daily With DHA        levothyroxine 137 MCG tablet     SYNTHROID/LEVOTHROID    90 tablet    TAKE 1 TABLET (137 MCG) BY MOUTH DAILY    Acquired hypothyroidism       metoprolol tartrate 25 MG tablet    LOPRESSOR    180 tablet    Take 0.5 tablets (12.5 mg) by mouth 2 times daily    Hypertension goal BP (blood pressure) < 140/90       MULTI VITAMIN DAILY PO      Take 1 tablet by mouth daily        order for DME     1 each    Equipment being ordered: Cpap, mask, tubing, and all associated supplies    LILLY (obstructive sleep apnea)       * telmisartan 80 MG tablet    MICARDIS    90 tablet    Take 1 tablet (80 mg) by mouth daily    Hypertension goal BP (blood pressure) < 140/90       * telmisartan 80 MG tablet    MICARDIS    30 tablet    Take 1 tablet (80 mg) by mouth daily    Hypertension goal BP (blood pressure) < 140/90       vitamin D 2000 UNITS Caps      Take 4,000 mg by mouth daily        * Notice:  This list has 2 medication(s) that are the same as other medications prescribed for you. Read the directions carefully, and ask your doctor or other care provider to review them with you.

## 2018-04-23 ENCOUNTER — TRANSFERRED RECORDS (OUTPATIENT)
Dept: HEALTH INFORMATION MANAGEMENT | Facility: CLINIC | Age: 68
End: 2018-04-23

## 2018-04-26 ENCOUNTER — HOSPITAL ENCOUNTER (OUTPATIENT)
Dept: MRI IMAGING | Facility: CLINIC | Age: 68
Discharge: HOME | End: 2018-04-26
Attending: PODIATRIST | Admitting: PODIATRIST
Payer: COMMERCIAL

## 2018-04-26 DIAGNOSIS — M25.572 LEFT ANKLE PAIN, UNSPECIFIED CHRONICITY: ICD-10-CM

## 2018-04-26 PROCEDURE — 73721 MRI JNT OF LWR EXTRE W/O DYE: CPT | Mod: LT

## 2018-05-10 ENCOUNTER — TRANSFERRED RECORDS (OUTPATIENT)
Dept: HEALTH INFORMATION MANAGEMENT | Facility: CLINIC | Age: 68
End: 2018-05-10

## 2019-03-19 ASSESSMENT — ENCOUNTER SYMPTOMS
ABDOMINAL PAIN: 0
NERVOUS/ANXIOUS: 0
FREQUENCY: 0
DYSURIA: 0
HEADACHES: 0
CHILLS: 0
SORE THROAT: 0
SHORTNESS OF BREATH: 0
PALPITATIONS: 0
NAUSEA: 0
HEMATOCHEZIA: 0
HEMATURIA: 0
MYALGIAS: 0
FEVER: 0
COUGH: 0
DIZZINESS: 0
DIARRHEA: 0
HEARTBURN: 0
ARTHRALGIAS: 1
WEAKNESS: 0
CONSTIPATION: 0
EYE PAIN: 0
PARESTHESIAS: 0
JOINT SWELLING: 0

## 2019-03-19 ASSESSMENT — ACTIVITIES OF DAILY LIVING (ADL): CURRENT_FUNCTION: NO ASSISTANCE NEEDED

## 2019-03-20 ASSESSMENT — ACTIVITIES OF DAILY LIVING (ADL): CURRENT_FUNCTION: NO ASSISTANCE NEEDED

## 2019-03-20 NOTE — PROGRESS NOTES
"SUBJECTIVE:   Wei Kline is a 69 year old male who presents for Preventive Visit.  Are you in the first 12 months of your Medicare coverage?  No    Concerns:  - Needs a prescription for a medication to replace his Micardis, as the insurance won't cover him.   - Wondering about having a Lifeline screening done, if there would be any benefit to this.     Annual Wellness Visit     In general, how would you rate your overall health?  Good    Frequency of exercise:  None    Do you usually eat at least 4 servings of fruit and vegetables a day, include whole grains    & fiber and avoid regularly eating high fat or \"junk\" foods?  No    Taking medications regularly:  Yes    Medication side effects:  None    Ability to successfully perform activities of daily living:  No assistance needed    Home Safety:  No safety concerns identified    Hearing Impairment:  Difficulty following a conversation in a noisy restaurant or crowded room    In the past 6 months, have you been bothered by leaking of urine?  No    In general, how would you rate your overall mental or emotional health?  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      Do you feel safe in your environment? Yes    Do you have a Health Care Directive? Yes: Patient states has Advance Directive and will bring in a copy to clinic.    Fall risk  Fallen 2 or more times in the past year?: No  Any fall with injury in the past year?: No    Cognitive Screening   1) Repeat 3 items (Leader, Season, Table)    2) Clock draw: NORMAL  3) 3 item recall: Recalls 3 objects  Results: 3 items recalled: COGNITIVE IMPAIRMENT LESS LIKELY    Mini-CogTM Copyright S Renny. Licensed by the author for use in [employer]; reprinted with permission (robb@.[facility]). All " rights reserved.      Do you have sleep apnea, excessive snoring or daytime drowsiness?: yes Sleep Apnea-using Cpap Machine nightly. His machine is 10 years old and would like to get a new CPAP machine.     Reviewed and updated as needed this visit by clinical staff  Tobacco  Allergies  Meds  Problems  Med Hx  Surg Hx  Fam Hx       Reviewed and updated as needed this visit by Provider  Tobacco  Allergies  Meds  Problems  Med Hx  Surg Hx  Fam Hx        Social History     Tobacco Use     Smoking status: Former Smoker     Packs/day: 0.50     Years: 5.00     Pack years: 2.50     Types: Cigarettes     Start date: 1955     Last attempt to quit: 1960     Years since quittin.2     Smokeless tobacco: Never Used   Substance Use Topics     Alcohol use: Yes     Comment: occasional       Alcohol Use 3/19/2019   If you drink alcohol do you typically have greater than 3 drinks per day OR greater than 7 drinks per week? No   No flowsheet data found.    Current providers sharing in care for this patient include:   Patient Care Team:  Geno Mcclelland MD as PCP - General (Internal Medicine)  Geno Mcclelland MD as Assigned PCP    The following health maintenance items are reviewed in Epic and correct as of today:  Health Maintenance   Topic Date Due     ZOSTER IMMUNIZATION (2 of 3) 2014     INFLUENZA VACCINE (1) 2018     MEDICARE ANNUAL WELLNESS VISIT  2019     FALL RISK ASSESSMENT  2019     DTAP/TDAP/TD IMMUNIZATION (2 - Td) 2019     PHQ-2 Q1 YR  2020     COLON CANCER SCREEN (SYSTEM ASSIGNED)  2020     LIPID SCREEN Q5 YR MALE (SYSTEM ASSIGNED)  2023     ADVANCE DIRECTIVE PLANNING Q5 YRS  2023     PNEUMOCOCCAL IMMUNIZATION 65+ LOW/MEDIUM RISK  Completed     AORTIC ANEURYSM SCREENING (SYSTEM ASSIGNED)  Completed     HEPATITIS C SCREENING  Addressed     IPV IMMUNIZATION  Aged Out     MENINGITIS IMMUNIZATION  Aged Out     Labs reviewed in EPIC    Review  "of Systems  CONSTITUTIONAL: NEGATIVE for fever, chills, change in weight  INTEGUMENTARY/SKIN: Mole on chin  EYES: NEGATIVE for vision changes or irritation  ENT/MOUTH: NEGATIVE for ear, mouth and throat problems  RESP: NEGATIVE for significant cough or SOB  BREAST: NEGATIVE for masses, tenderness or discharge  CV: NEGATIVE for chest pain, palpitations or peripheral edema  GI: NEGATIVE for nausea, abdominal pain, heartburn, or change in bowel habits  : NEGATIVE for frequency, dysuria, or hematuria  MUSCULOSKELETAL: NEGATIVE for significant arthralgias or myalgia  NEURO: NEGATIVE for weakness, dizziness or paresthesias  ENDOCRINE: NEGATIVE for temperature intolerance, skin/hair changes  HEME: NEGATIVE for bleeding problems  PSYCHIATRIC: NEGATIVE for changes in mood or affect    This document serves as a record of the services and decisions personally performed and made by Geno Mcclelland MD. It was created on her behalf by Carolyn Perez, a trained medical scribe. The creation of this document is based the provider's statements to the medical scribe.  Carolyn Perez, March 22, 2019 8:53 AM     OBJECTIVE:   /82   Pulse 66   Temp 96.9  F (36.1  C) (Oral)   Resp 18   Ht 1.785 m (5' 10.28\")   Wt 132.5 kg (292 lb 3.2 oz)   SpO2 97%   BMI 41.60 kg/m   Estimated body mass index is 41.6 kg/m  as calculated from the following:    Height as of this encounter: 1.785 m (5' 10.28\").    Weight as of this encounter: 132.5 kg (292 lb 3.2 oz).  Physical Exam  GENERAL: obese, alert and no distress  EYES: Eyes grossly normal to inspection, PERRL and conjunctivae and sclerae normal  HENT: ear canals and TM's normal, nose without ulcers or lesions, Narrow oropharynx  NECK: no adenopathy, no asymmetry, masses, or scars and thyroid normal to palpation  RESP: lungs clear to auscultation - no rales, rhonchi or wheezes  CV: regular rate and rhythm, normal S1 S2, no S3 or S4, no murmur, click or rub, left ankle swelling, peripheral pulses " strong  ABDOMEN: reducible umbilical hernia; otherwise soft, nontender, no hepatosplenomegaly, no masses and bowel sounds normal  MS: no gross musculoskeletal defects noted, no edema  SKIN: Brown stuck on plaque on left chin consistent with a seborrheic keratosis.   NEURO: Normal strength and tone, mentation intact and speech normal  PSYCH: mentation appears normal, affect normal/bright  No carotid bruits bilateral     Diagnostic Test Results:  Results for orders placed or performed in visit on 03/22/19 (from the past 24 hour(s))   CBC with platelets   Result Value Ref Range    WBC 7.4 4.0 - 11.0 10e9/L    RBC Count 4.81 4.4 - 5.9 10e12/L    Hemoglobin 15.1 13.3 - 17.7 g/dL    Hematocrit 44.8 40.0 - 53.0 %    MCV 93 78 - 100 fl    MCH 31.4 26.5 - 33.0 pg    MCHC 33.7 31.5 - 36.5 g/dL    RDW 13.9 10.0 - 15.0 %    Platelet Count 161 150 - 450 10e9/L   Hemoglobin A1c   Result Value Ref Range    Hemoglobin A1C 5.6 0 - 5.6 %       ASSESSMENT / PLAN:   1. Routine general medical examination at a health care facility  Negative screening exam; up-to-date on preventive services.    2. Gout, unspecified cause, unspecified chronicity, unspecified site  No recent attacks. Lab update, and refills given.   - Uric acid  - allopurinol (ZYLOPRIM) 300 MG tablet; Take 1 tablet (300 mg) by mouth daily  Dispense: 90 tablet; Refill: 4    3. Acquired hypothyroidism  Doing well. Recheck thyroid labs today, Synthroid refills given.   - TSH with free T4 reflex  - levothyroxine (SYNTHROID/LEVOTHROID) 137 MCG tablet; TAKE 1 TABLET (137 MCG) BY MOUTH DAILY  Dispense: 90 tablet; Refill: 4    4. Hyperlipidemia with target LDL less than 100  Historically at goal. On a moderate-intensity statin. Repeat cholesterol lab today. Refills given.   - Lipid panel reflex to direct LDL Fasting  - atorvastatin (LIPITOR) 20 MG tablet; Take 1 tablet (20 mg) by mouth daily  Dispense: 90 tablet; Refill: 4    5. Hypertension goal BP (blood pressure) < 140/90  Well  controlled. Refilled prescriptions as requested. Labs today.   - Comprehensive metabolic panel  - CBC with platelets  - metoprolol tartrate (LOPRESSOR) 25 MG tablet; Take 0.5 tablets (12.5 mg) by mouth 2 times daily  Dispense: 180 tablet; Refill: 4  - telmisartan (MICARDIS) 80 MG tablet; Take 1 tablet (80 mg) by mouth daily Telmisartan generic  Dispense: 90 tablet; Refill: 3    6. Chronic kidney disease, stage III (moderate) (H)  Due for labs today  - Comprehensive metabolic panel  - CBC with platelets    7. Elevation of level of transaminase or lactic acid dehydrogenase (LDH)  Repeat labs today.  Noted on prior labs  - Comprehensive metabolic panel  - CBC with platelets    8. Hyperglycemia  Monitor periodically for development of diabetes.   - Comprehensive metabolic panel  - Hemoglobin A1c    9. LILLY (obstructive sleep apnea)  Order given for repeat sleep study as it has been greater than 10 years since his last and the notes from this are not available to order a new CPAP machine.   - SLEEP EVALUATION & MANAGEMENT REFERRAL - [facility]  [phone] (Age 2 and up); Future    10. Morbid obesity (H)  Discussed increasing exercise, aiming to get the heart rate up with physical activity.     11. Seborrheic keratosis  Reassured patient that the lesion on his chin is not concerning.     12. Umbilical hernia without obstruction and without gangrene  Noted. Not causing him discomfort at this time. Monitor periodically.       End of Life Planning:  Patient currently has an advanced directive: No.  I have verified the patient's ablity to prepare an advanced directive/make health care decisions.  Literature was provided to assist patient in preparing an advanced directive.    COUNSELING:  Reviewed preventive health counseling, as reflected in patient instructions       Regular exercise       Healthy diet/nutrition       Immunizations    Declined: Influenza due to Other Doesn't like shots.          "   Aspirin Prophylaxsis    BP Readings from Last 1 Encounters:   03/22/19 126/82     Estimated body mass index is 41.6 kg/m  as calculated from the following:    Height as of this encounter: 1.785 m (5' 10.28\").    Weight as of this encounter: 132.5 kg (292 lb 3.2 oz).    Weight management plan: Discussed healthy diet and exercise guidelines     reports that he quit smoking about 59 years ago. His smoking use included cigarettes. He started smoking about 64 years ago. He has a 2.50 pack-year smoking history. he has never used smokeless tobacco.    Appropriate preventive services were discussed with this patient, including applicable screening as appropriate for cardiovascular disease, diabetes, osteopenia/osteoporosis, and glaucoma.  As appropriate for age/gender, discussed screening for colorectal cancer, prostate cancer, breast cancer, and cervical cancer. Checklist reviewing preventive services available has been given to the patient.    Reviewed patients plan of care and provided an AVS. The Basic Care Plan (routine screening as documented in Health Maintenance) for Wei meets the Care Plan requirement. This Care Plan has been established and reviewed with the Patient.    Counseling Resources:  ATP IV Guidelines  Pooled Cohorts Equation Calculator  Breast Cancer Risk Calculator  FRAX Risk Assessment  ICSI Preventive Guidelines  Dietary Guidelines for Americans, 2010  [employer]'s MyPlate  ASA Prophylaxis  Lung CA Screening    The information in this document, created by the medical scribe for me, accurately reflects the services I personally performed and the decisions made by me. I have reviewed and approved this document for accuracy.     Geno Mcclelland MD  [facility] [place]    Patient Instructions   Exercise goal is to get your heart-rate up for 30 minutes most days of the week. Aim to get to the level of activity where holding a conversation would be difficult.    "

## 2019-03-22 ENCOUNTER — OFFICE VISIT (OUTPATIENT)
Dept: INTERNAL MEDICINE | Facility: CLINIC | Age: 69
End: 2019-03-22
Payer: COMMERCIAL

## 2019-03-22 VITALS
OXYGEN SATURATION: 97 % | HEART RATE: 66 BPM | SYSTOLIC BLOOD PRESSURE: 126 MMHG | BODY MASS INDEX: 41.83 KG/M2 | HEIGHT: 70 IN | WEIGHT: 292.2 LBS | RESPIRATION RATE: 18 BRPM | DIASTOLIC BLOOD PRESSURE: 82 MMHG | TEMPERATURE: 96.9 F

## 2019-03-22 DIAGNOSIS — M10.9 GOUT, UNSPECIFIED CAUSE, UNSPECIFIED CHRONICITY, UNSPECIFIED SITE: ICD-10-CM

## 2019-03-22 DIAGNOSIS — E03.9 ACQUIRED HYPOTHYROIDISM: ICD-10-CM

## 2019-03-22 DIAGNOSIS — Z00.00 ROUTINE GENERAL MEDICAL EXAMINATION AT A HEALTH CARE FACILITY: Primary | ICD-10-CM

## 2019-03-22 DIAGNOSIS — E78.5 HYPERLIPIDEMIA WITH TARGET LDL LESS THAN 100: ICD-10-CM

## 2019-03-22 DIAGNOSIS — I10 HYPERTENSION GOAL BP (BLOOD PRESSURE) < 140/90: ICD-10-CM

## 2019-03-22 DIAGNOSIS — K42.9 UMBILICAL HERNIA WITHOUT OBSTRUCTION AND WITHOUT GANGRENE: ICD-10-CM

## 2019-03-22 DIAGNOSIS — E66.01 MORBID OBESITY (H): ICD-10-CM

## 2019-03-22 DIAGNOSIS — G47.33 OSA (OBSTRUCTIVE SLEEP APNEA): ICD-10-CM

## 2019-03-22 DIAGNOSIS — N18.30 CHRONIC KIDNEY DISEASE, STAGE III (MODERATE) (H): ICD-10-CM

## 2019-03-22 DIAGNOSIS — R73.9 HYPERGLYCEMIA: ICD-10-CM

## 2019-03-22 DIAGNOSIS — L82.1 SEBORRHEIC KERATOSIS: ICD-10-CM

## 2019-03-22 LAB
ALBUMIN SERPL-MCNC: 3.6 G/DL (ref 3.4–5)
ALP SERPL-CCNC: 87 U/L (ref 40–150)
ALT SERPL W P-5'-P-CCNC: 34 U/L (ref 0–70)
ANION GAP SERPL CALCULATED.3IONS-SCNC: 4 MMOL/L (ref 3–14)
AST SERPL W P-5'-P-CCNC: 31 U/L (ref 0–45)
BILIRUB SERPL-MCNC: 0.5 MG/DL (ref 0.2–1.3)
BUN SERPL-MCNC: 23 MG/DL (ref 7–30)
CALCIUM SERPL-MCNC: 9.4 MG/DL (ref 8.5–10.1)
CHLORIDE SERPL-SCNC: 109 MMOL/L (ref 94–109)
CHOLEST SERPL-MCNC: 176 MG/DL
CO2 SERPL-SCNC: 27 MMOL/L (ref 20–32)
CREAT SERPL-MCNC: 1.18 MG/DL (ref 0.66–1.25)
ERYTHROCYTE [DISTWIDTH] IN BLOOD BY AUTOMATED COUNT: 13.9 % (ref 10–15)
GFR SERPL CREATININE-BSD FRML MDRD: 62 ML/MIN/{1.73_M2}
GLUCOSE SERPL-MCNC: 94 MG/DL (ref 70–99)
HBA1C MFR BLD: 5.6 % (ref 0–5.6)
HCT VFR BLD AUTO: 44.8 % (ref 40–53)
HDLC SERPL-MCNC: 56 MG/DL
HGB BLD-MCNC: 15.1 G/DL (ref 13.3–17.7)
LDLC SERPL CALC-MCNC: 95 MG/DL
MCH RBC QN AUTO: 31.4 PG (ref 26.5–33)
MCHC RBC AUTO-ENTMCNC: 33.7 G/DL (ref 31.5–36.5)
MCV RBC AUTO: 93 FL (ref 78–100)
NONHDLC SERPL-MCNC: 120 MG/DL
PLATELET # BLD AUTO: 161 10E9/L (ref 150–450)
POTASSIUM SERPL-SCNC: 4.4 MMOL/L (ref 3.4–5.3)
PROT SERPL-MCNC: 7.7 G/DL (ref 6.8–8.8)
RBC # BLD AUTO: 4.81 10E12/L (ref 4.4–5.9)
SODIUM SERPL-SCNC: 140 MMOL/L (ref 133–144)
TRIGL SERPL-MCNC: 123 MG/DL
TSH SERPL DL<=0.005 MIU/L-ACNC: 0.87 MU/L (ref 0.4–4)
URATE SERPL-MCNC: 5.8 MG/DL (ref 3.5–7.2)
WBC # BLD AUTO: 7.4 10E9/L (ref 4–11)

## 2019-03-22 PROCEDURE — 83036 HEMOGLOBIN GLYCOSYLATED A1C: CPT | Performed by: INTERNAL MEDICINE

## 2019-03-22 PROCEDURE — 84550 ASSAY OF BLOOD/URIC ACID: CPT | Performed by: INTERNAL MEDICINE

## 2019-03-22 PROCEDURE — 85027 COMPLETE CBC AUTOMATED: CPT | Performed by: INTERNAL MEDICINE

## 2019-03-22 PROCEDURE — 80053 COMPREHEN METABOLIC PANEL: CPT | Performed by: INTERNAL MEDICINE

## 2019-03-22 PROCEDURE — 80061 LIPID PANEL: CPT | Performed by: INTERNAL MEDICINE

## 2019-03-22 PROCEDURE — 36415 COLL VENOUS BLD VENIPUNCTURE: CPT | Performed by: INTERNAL MEDICINE

## 2019-03-22 PROCEDURE — 99397 PER PM REEVAL EST PAT 65+ YR: CPT | Performed by: INTERNAL MEDICINE

## 2019-03-22 PROCEDURE — 84443 ASSAY THYROID STIM HORMONE: CPT | Performed by: INTERNAL MEDICINE

## 2019-03-22 RX ORDER — METOPROLOL TARTRATE 25 MG/1
12.5 TABLET, FILM COATED ORAL 2 TIMES DAILY
Qty: 180 TABLET | Refills: 4 | Status: SHIPPED | OUTPATIENT
Start: 2019-03-22 | End: 2019-06-10

## 2019-03-22 RX ORDER — TELMISARTAN 80 MG/1
80 TABLET ORAL DAILY
Qty: 90 TABLET | Refills: 3 | Status: SHIPPED | OUTPATIENT
Start: 2019-03-22 | End: 2020-03-19

## 2019-03-22 RX ORDER — ATORVASTATIN CALCIUM 20 MG/1
20 TABLET, FILM COATED ORAL DAILY
Qty: 90 TABLET | Refills: 4 | Status: SHIPPED | OUTPATIENT
Start: 2019-03-22 | End: 2020-03-19

## 2019-03-22 RX ORDER — LEVOTHYROXINE SODIUM 137 UG/1
TABLET ORAL
Qty: 90 TABLET | Refills: 4 | Status: SHIPPED | OUTPATIENT
Start: 2019-03-22 | End: 2020-03-19

## 2019-03-22 RX ORDER — ALLOPURINOL 300 MG/1
300 TABLET ORAL DAILY
Qty: 90 TABLET | Refills: 4 | Status: SHIPPED | OUTPATIENT
Start: 2019-03-22 | End: 2020-03-19

## 2019-03-22 ASSESSMENT — MIFFLIN-ST. JEOR: SCORE: 2096.04

## 2019-03-22 ASSESSMENT — PAIN SCALES - GENERAL: PAINLEVEL: NO PAIN (0)

## 2019-03-22 NOTE — PATIENT INSTRUCTIONS
Exercise goal is to get your heart-rate up for 30 minutes most days of the week. Aim to get to the level of activity where holding a conversation would be difficult.

## 2019-03-22 NOTE — RESULT ENCOUNTER NOTE
Normal electrolytes. Normal kidney function. Normal liver blood test. Normal thyroid. Normal uric acid level. Good cholesterol. Good 3 month sugar average.   Normal blood count.

## 2019-05-28 ENCOUNTER — OFFICE VISIT (OUTPATIENT)
Dept: FAMILY MEDICINE | Facility: CLINIC | Age: 69
End: 2019-05-28
Payer: COMMERCIAL

## 2019-05-28 ENCOUNTER — ANCILLARY PROCEDURE (OUTPATIENT)
Dept: GENERAL RADIOLOGY | Facility: CLINIC | Age: 69
End: 2019-05-28
Attending: NURSE PRACTITIONER
Payer: COMMERCIAL

## 2019-05-28 VITALS
HEIGHT: 70 IN | OXYGEN SATURATION: 95 % | BODY MASS INDEX: 42.37 KG/M2 | DIASTOLIC BLOOD PRESSURE: 78 MMHG | WEIGHT: 296 LBS | SYSTOLIC BLOOD PRESSURE: 120 MMHG | TEMPERATURE: 97.5 F | RESPIRATION RATE: 24 BRPM | HEART RATE: 81 BPM

## 2019-05-28 DIAGNOSIS — B34.9 VIRAL SYNDROME: Primary | ICD-10-CM

## 2019-05-28 DIAGNOSIS — R05.9 COUGH: ICD-10-CM

## 2019-05-28 DIAGNOSIS — R06.02 SOB (SHORTNESS OF BREATH): ICD-10-CM

## 2019-05-28 DIAGNOSIS — R09.89 CHEST CONGESTION: ICD-10-CM

## 2019-05-28 PROCEDURE — 99214 OFFICE O/P EST MOD 30 MIN: CPT | Performed by: NURSE PRACTITIONER

## 2019-05-28 PROCEDURE — 71046 X-RAY EXAM CHEST 2 VIEWS: CPT

## 2019-05-28 ASSESSMENT — MIFFLIN-ST. JEOR: SCORE: 2113.28

## 2019-05-28 NOTE — PATIENT INSTRUCTIONS
"Increase rest and fluids. Tylenol and/or Ibuprofen for comfort. Cool mist vaporizer. If your symptoms worsen or do not resolve follow up with your primary care provider in 1 week and sooner if needed.      Mucinex 600 mg 12 hour formula for ear, head and chest congestion.  It can also thin post nasal drip which can cause a cough and sore throat.    Indications for emergent return to emergency department discussed with patient, who verbalized good understanding and agreement.  Patient understands the limitations of today's evaluation.           Patient Education     Viral Syndrome (Adult)  A viral illness may cause a number of symptoms such as fever. Other symptoms depend on the part of the body that the virus affects. If it settles in your nose, throat, and lungs, it may cause cough, sore throat, congestion, runny nose, headache, earache and other ear symptoms, or shortness of breath. If it settles in your stomach and intestinal tract, it may cause nausea, vomiting, cramping, and diarrhea. Sometimes it causes generalized symptoms like \"aching all over,\" feeling tired, loss of energy, or loss of appetite.  A viral illness usually lasts anywhere from several days to several weeks, but sometimes it lasts longer. In some cases, a more serious infection can look like a viral syndrome in the first few days of the illness. You may need another exam and additional tests to know the difference. Watch for the warning signs listed below for when to seek medical advice.  Home care  Follow these guidelines for taking care of yourself at home:    If symptoms are severe, rest at home for the first 2 to 3 days.    Stay away from cigarette smoke - both your smoke and the smoke from others.    You may use over-the-counter acetaminophen or ibuprofen for fever, muscle aching, and headache, unless another medicine was prescribed for this. If you have chronic liver or kidney disease or ever had a stomach ulcer or gastrointestinal " bleeding, talk with your healthcare provider before using these medicines. No one who is younger than 18 and ill with a fever should take aspirin. It may cause severe disease or death.    Your appetite may be poor, so a light diet is fine. Avoid dehydration by drinking 8 to 12, 8-ounce glasses of fluids each day. This may include water; orange juice; lemonade; apple, grape, and cranberry juice; clear fruit drinks; electrolyte replacement and sports drinks; and decaffeinated teas and coffee. If you have been diagnosed with a kidney disease, ask your healthcare provider how much and what types of fluids you should drink to prevent dehydration. If you have kidney disease, drinking too much fluid can cause it build up in the your body and be dangerous to your health.    Over-the-counter remedies won't shorten the length of the illness but may be helpful for symptoms such as cough, sore throat, nasal and sinus congestion, or diarrhea. Don't use decongestants if you have high blood pressure.  Follow-up care  Follow up with your healthcare provider if you do not improve over the next week.  Call 911  Call 911 if any of the following occur:    Convulsion    Feeling weak, dizzy, or like you are going to faint    Chest pain, or more than mild shortness of breath  When to seek medical advice  Call your healthcare provider right away if any of these occur:    Cough with lots of colored sputum (mucus) or blood in your sputum    Chest pain, shortness of breath, wheezing, or trouble breathing    Severe headache; face, neck, or ear pain    Severe, constant pain in the lower right side of your belly (abdominal)    Continued vomiting (can t keep liquids down)    Frequent diarrhea (more than 5 times a day); blood (red or black color) or mucus in diarrhea    Feeling weak, dizzy, or like you are going to faint    Extreme thirst    Fever of 100.4 F (38 C) or higher, or as directed by your healthcare provider  Date Last Reviewed:  4/1/2018 2000-2018 [employer]. [address] [place] [address], [place] [address]. All rights reserved. This information is not intended as a substitute for professional medical care. Always follow your healthcare professional's instructions.

## 2019-05-28 NOTE — PROGRESS NOTES
Subjective     Wei Kline is a 69 year old male who presents to clinic today for the following health issues:    HPI   Cough       Duration: 1 week     Description (location/character/radiation): Cough     Intensity:  moderate    Accompanying signs and symptoms: cough- productive, runny nose, congestion, wheezing, shortness of breath, no nausea or vomiting or diarrhea, no fever, no ear pain, no headaches     History (similar episodes/previous evaluation): Wife in last week for pneumonia     Precipitating or alleviating factors: None    Therapies tried and outcome: flonase, decongestant          Patient Active Problem List   Diagnosis     Gout     Hypothyroidism     History of Lyme disease     Hyperlipidemia with target LDL less than 100     Hypertension goal BP (blood pressure) < 140/90     History of cardiomyopathy     LILLY (obstructive sleep apnea)     Kidney stones     Chronic kidney disease, stage III (moderate) (H)     Gout, unspecified     Morbid obesity (H)     Elevation of level of transaminase or lactic acid dehydrogenase (LDH)     Hyperglycemia     Seborrheic keratosis     Umbilical hernia without obstruction and without gangrene     Past Surgical History:   Procedure Laterality Date     arthroscopic knee surgery      bilateral knees (no replacement)     TONSILLECTOMY         Social History     Tobacco Use     Smoking status: Former Smoker     Packs/day: 0.50     Years: 5.00     Pack years: 2.50     Types: Cigarettes     Start date: 1955     Last attempt to quit: 1960     Years since quittin.4     Smokeless tobacco: Never Used   Substance Use Topics     Alcohol use: Yes     Comment: occasional     Family History   Problem Relation Age of Onset     Breast Cancer Mother      Cancer Father      Cerebrovascular Disease Paternal Grandmother      Cancer - colorectal Paternal Grandfather      Cancer - colorectal Brother      Diabetes Brother      Peptic Ulcer Disease Brother      Lupus  "Sister      Hypertension Son      Diabetes Brother      Hypertension Son          Current Outpatient Medications   Medication Sig Dispense Refill     allopurinol (ZYLOPRIM) 300 MG tablet Take 1 tablet (300 mg) by mouth daily 90 tablet 4     aspirin 81 MG tablet Take 81 mg by mouth daily       atorvastatin (LIPITOR) 20 MG tablet Take 1 tablet (20 mg) by mouth daily 90 tablet 4     Cholecalciferol (VITAMIN D) 2000 UNITS CAPS Take 4,000 mg by mouth daily       FOLIC ACID PO Take 1 mg by mouth daily With DHA       levothyroxine (SYNTHROID/LEVOTHROID) 137 MCG tablet TAKE 1 TABLET (137 MCG) BY MOUTH DAILY 90 tablet 4     metoprolol tartrate (LOPRESSOR) 25 MG tablet Take 0.5 tablets (12.5 mg) by mouth 2 times daily 180 tablet 4     Multiple Vitamin (MULTI VITAMIN DAILY PO) Take 1 tablet by mouth daily       order for DME Equipment being ordered: Cpap, mask, tubing, and all associated supplies 1 each 0     telmisartan (MICARDIS) 80 MG tablet Take 1 tablet (80 mg) by mouth daily Telmisartan generic 90 tablet 3     No Known Allergies      Reviewed and updated as needed this visit by Provider  Tobacco  Allergies  Meds  Problems  Med Hx  Surg Hx  Fam Hx         Review of Systems   ROS COMP: Constitutional, HEENT, cardiovascular, pulmonary, GI, , musculoskeletal, neuro, skin, endocrine and psych systems are negative, except as otherwise noted.      Objective    /78   Pulse 81   Temp 97.5  F (36.4  C) (Tympanic)   Resp 24   Ht 1.785 m (5' 10.28\")   Wt 134.3 kg (296 lb)   SpO2 95%   BMI 42.14 kg/m    Body mass index is 42.14 kg/m .  Physical Exam   GENERAL: healthy, alert and no distress, nontoxic in appearance  EYES: Eyes grossly normal to inspection, PERRL and conjunctivae and sclerae normal  HENT: ear canals and TM's normal, nose and mouth without ulcers or lesions  NECK: no adenopathy, supple with full ROM  RESP: lungs clear to auscultation - no rales, rhonchi or wheezes  CV: regular rate and rhythm, " normal S1 S2, no S3 or S4, no murmur, click or rub, no peripheral edema   ABDOMEN: soft, nontender, no hepatosplenomegaly, no masses and bowel sounds normal  MS: no gross musculoskeletal defects noted, no edema  No rash    Diagnostic Test Results: question right and left middle lung fields with stan out areas. Will await over read.    CHEST TWO VIEWS May 28, 2019 1:12 PM      HISTORY: 69-year-old patient with cough and shortness of breath.     COMPARISON: None.                                                                       IMPRESSION: Heart size is normal. No pleural effusion, pneumothorax,  or abnormal area of consolidation.    Labs reviewed in Epic  No results found for this or any previous visit (from the past 24 hour(s)).        Assessment & Plan   Problem List Items Addressed This Visit     None      Visit Diagnoses     Viral syndrome    -  Primary    Cough        Relevant Orders    XR Chest 2 Views (Completed)    SOB (shortness of breath)        Relevant Orders    XR Chest 2 Views (Completed)    Chest congestion        Relevant Orders    XR Chest 2 Views (Completed)               Patient Instructions   Increase rest and fluids. Tylenol and/or Ibuprofen for comfort. Cool mist vaporizer. If your symptoms worsen or do not resolve follow up with your primary care provider in 1 week and sooner if needed.      Mucinex 600 mg 12 hour formula for ear, head and chest congestion.  It can also thin post nasal drip which can cause a cough and sore throat.    Indications for emergent return to emergency department discussed with patient, who verbalized good understanding and agreement.  Patient understands the limitations of today's evaluation.           Patient Education     Viral Syndrome (Adult)  A viral illness may cause a number of symptoms such as fever. Other symptoms depend on the part of the body that the virus affects. If it settles in your nose, throat, and lungs, it may cause cough, sore throat,  "congestion, runny nose, headache, earache and other ear symptoms, or shortness of breath. If it settles in your stomach and intestinal tract, it may cause nausea, vomiting, cramping, and diarrhea. Sometimes it causes generalized symptoms like \"aching all over,\" feeling tired, loss of energy, or loss of appetite.  A viral illness usually lasts anywhere from several days to several weeks, but sometimes it lasts longer. In some cases, a more serious infection can look like a viral syndrome in the first few days of the illness. You may need another exam and additional tests to know the difference. Watch for the warning signs listed below for when to seek medical advice.  Home care  Follow these guidelines for taking care of yourself at home:    If symptoms are severe, rest at home for the first 2 to 3 days.    Stay away from cigarette smoke - both your smoke and the smoke from others.    You may use over-the-counter acetaminophen or ibuprofen for fever, muscle aching, and headache, unless another medicine was prescribed for this. If you have chronic liver or kidney disease or ever had a stomach ulcer or gastrointestinal bleeding, talk with your healthcare provider before using these medicines. No one who is younger than 18 and ill with a fever should take aspirin. It may cause severe disease or death.    Your appetite may be poor, so a light diet is fine. Avoid dehydration by drinking 8 to 12, 8-ounce glasses of fluids each day. This may include water; orange juice; lemonade; apple, grape, and cranberry juice; clear fruit drinks; electrolyte replacement and sports drinks; and decaffeinated teas and coffee. If you have been diagnosed with a kidney disease, ask your healthcare provider how much and what types of fluids you should drink to prevent dehydration. If you have kidney disease, drinking too much fluid can cause it build up in the your body and be dangerous to your health.    Over-the-counter remedies won't " shorten the length of the illness but may be helpful for symptoms such as cough, sore throat, nasal and sinus congestion, or diarrhea. Don't use decongestants if you have high blood pressure.  Follow-up care  Follow up with your healthcare provider if you do not improve over the next week.  Call 911  Call 911 if any of the following occur:    Convulsion    Feeling weak, dizzy, or like you are going to faint    Chest pain, or more than mild shortness of breath  When to seek medical advice  Call your healthcare provider right away if any of these occur:    Cough with lots of colored sputum (mucus) or blood in your sputum    Chest pain, shortness of breath, wheezing, or trouble breathing    Severe headache; face, neck, or ear pain    Severe, constant pain in the lower right side of your belly (abdominal)    Continued vomiting (can t keep liquids down)    Frequent diarrhea (more than 5 times a day); blood (red or black color) or mucus in diarrhea    Feeling weak, dizzy, or like you are going to faint    Extreme thirst    Fever of 100.4 F (38 C) or higher, or as directed by your healthcare provider  Date Last Reviewed: 4/1/2018 2000-2018 [employer]. [address] [place] [id number]. All rights reserved. This information is not intended as a substitute for professional medical care. Always follow your healthcare professional's instructions.             Return in about 1 week (around 6/4/2019), or if symptoms worsen or fail to improve, for Follow up with your primary care provider.    RUSSEL Zapata [facility]

## 2019-05-31 ENCOUNTER — OFFICE VISIT (OUTPATIENT)
Dept: SLEEP MEDICINE | Facility: CLINIC | Age: 69
End: 2019-05-31
Payer: COMMERCIAL

## 2019-05-31 VITALS
HEIGHT: 70 IN | SYSTOLIC BLOOD PRESSURE: 120 MMHG | WEIGHT: 297 LBS | DIASTOLIC BLOOD PRESSURE: 76 MMHG | HEART RATE: 68 BPM | OXYGEN SATURATION: 94 % | BODY MASS INDEX: 42.52 KG/M2

## 2019-05-31 DIAGNOSIS — G47.33 OSA (OBSTRUCTIVE SLEEP APNEA): ICD-10-CM

## 2019-05-31 PROCEDURE — 99205 OFFICE O/P NEW HI 60 MIN: CPT | Performed by: FAMILY MEDICINE

## 2019-05-31 RX ORDER — MULTIVIT WITH MINERALS/LUTEIN
1000 TABLET ORAL DAILY
COMMUNITY

## 2019-05-31 ASSESSMENT — MIFFLIN-ST. JEOR: SCORE: 2117.4

## 2019-05-31 NOTE — PROGRESS NOTES
"  Sleep Consultation:    Date on this visit: 5/31/2019    Wei Kline is sent by Geno Mcclelland for a sleep consultation regarding need for replacement CPAP equipment.    Primary Physician: Geno Mcclelland     Chief Complaint: \"I need to get a new CPAP machine.\"    HPI:  Wei Kline is a pleasant 68 yo M with history of LILLY, obesity, hypothyroidism, CKD III who presents to establish care for LILLY and need for new equipment.    Diagnosed with mild LILLY on PSG performed 5/10/2010 at [facility] with weight 256 lbs, AHI 9.9, mean SpO2 91.9%, katlin SpO2 87%.  CPAP 9 cm H2O appeared effective, though no supine REM observed.  Frequent PVC's mentioned.    Overall, he feels he sleeps well with his CPAP and has no concerns today.  He is interested in a replacement CPAP given his current machine will sometimes turn off unexpectedly during the night.  No residual snoring or apnea.  He does feel the CPAP pressure is a bit low.  His CPAP is currently programmed at 9 cm H2O, AHI over past 30 days of ~5.5, used nightly and average usage > 7 hours.    Wei denies any sleep walking and dream enactment.    Patient's [place] Sleepiness score 8/24 consistent with no daytime sleepiness.      Allergies:    No Known Allergies    Medications:    Current Outpatient Medications   Medication Sig Dispense Refill     allopurinol (ZYLOPRIM) 300 MG tablet Take 1 tablet (300 mg) by mouth daily 90 tablet 4     aspirin 81 MG tablet Take 81 mg by mouth daily       atorvastatin (LIPITOR) 20 MG tablet Take 1 tablet (20 mg) by mouth daily 90 tablet 4     Cholecalciferol (VITAMIN D) 2000 UNITS CAPS Take 4,000 mg by mouth daily       FOLIC ACID PO Take 1 mg by mouth daily With DHA       levothyroxine (SYNTHROID/LEVOTHROID) 137 MCG tablet TAKE 1 TABLET (137 MCG) BY MOUTH DAILY 90 tablet 4     metoprolol tartrate (LOPRESSOR) 25 MG tablet Take 0.5 tablets (12.5 mg) by mouth 2 times daily 180 tablet 4     Multiple Vitamin (MULTI VITAMIN DAILY PO) " Take 1 tablet by mouth daily       order for DME Equipment being ordered: Cpap, mask, tubing, and all associated supplies 1 each 0     telmisartan (MICARDIS) 80 MG tablet Take 1 tablet (80 mg) by mouth daily Telmisartan generic 90 tablet 3       Problem List:  Patient Active Problem List    Diagnosis Date Noted     Morbid obesity (H) 03/22/2019     Priority: Medium     Elevation of level of transaminase or lactic acid dehydrogenase (LDH) 03/22/2019     Priority: Medium     Hyperglycemia 03/22/2019     Priority: Medium     Seborrheic keratosis 03/22/2019     Priority: Medium     Umbilical hernia without obstruction and without gangrene 03/22/2019     Priority: Medium     Gout, unspecified 03/03/2017     Priority: Medium     Chronic kidney disease, stage III (moderate) (H) 03/21/2016     Priority: Medium     Kidney stones 02/04/2015     Priority: Medium     Gout 01/16/2015     Priority: Medium     Diagnosed in 2009 at [facility], no flares since starting allopurinol.  Problem list name updated by automated process. Provider to review       Hypothyroidism 01/16/2015     Priority: Medium     History of Lyme disease 01/16/2015     Priority: Medium     Hyperlipidemia with target LDL less than 100 01/16/2015     Priority: Medium     Diagnosis updated by automated process. Provider to review and confirm.       Hypertension goal BP (blood pressure) < 140/90 01/16/2015     Priority: Medium     History of cardiomyopathy 01/16/2015     Priority: Medium     Had angiogram in 2002- clean arteries.  Thought to be viral.  Mild decrease in EF initially but last echo in 2014 was normal.       LILLY (obstructive sleep apnea) 01/16/2015     Priority: Medium        Past Medical/Surgical History:  Past Medical History:   Diagnosis Date     Gout, unspecified 1/16/2015    Diagnosed in 2009 at [facility], no flares since starting allopurinol.     History of cardiomyopathy 1/16/2015    Had angiogram in 2002- clean arteries.  Thought to be viral.  Mild  decrease in EF initially but last echo in  was normal.     History of Lyme disease 2015     Hyperlipidemia LDL goal < 100 2015     Hypertension goal BP (blood pressure) < 140/90 2015     Hypothyroidism 2015     Past Surgical History:   Procedure Laterality Date     arthroscopic knee surgery      bilateral knees (no replacement)     TONSILLECTOMY         Social History:  Social History     Socioeconomic History     Marital status:      Spouse name: Not on file     Number of children: Not on file     Years of education: Not on file     Highest education level: Not on file   Occupational History     Not on file   Social Needs     Financial resource strain: Not on file     Food insecurity:     Worry: Not on file     Inability: Not on file     Transportation needs:     Medical: Not on file     Non-medical: Not on file   Tobacco Use     Smoking status: Former Smoker     Packs/day: 0.50     Years: 5.00     Pack years: 2.50     Types: Cigarettes     Start date: 1955     Last attempt to quit: 1960     Years since quittin.4     Smokeless tobacco: Never Used   Substance and Sexual Activity     Alcohol use: Yes     Comment: occasional     Drug use: No     Sexual activity: Yes     Partners: Female   Lifestyle     Physical activity:     Days per week: Not on file     Minutes per session: Not on file     Stress: Not on file   Relationships     Social connections:     Talks on phone: Not on file     Gets together: Not on file     Attends Religion service: Not on file     Active member of club or organization: Not on file     Attends meetings of clubs or organizations: Not on file     Relationship status: Not on file     Intimate partner violence:     Fear of current or ex partner: Not on file     Emotionally abused: Not on file     Physically abused: Not on file     Forced sexual activity: Not on file   Other Topics Concern     Parent/sibling w/ CABG, MI or angioplasty before 65F  "55M? Not Asked   Social History Narrative     Not on file       Family History:  Family History   Problem Relation Age of Onset     Breast Cancer Mother      Cancer Father      Cerebrovascular Disease Paternal Grandmother      Cancer - colorectal Paternal Grandfather      Cancer - colorectal Brother      Diabetes Brother      Peptic Ulcer Disease Brother      Lupus Sister      Hypertension Son      Diabetes Brother      Hypertension Son        Review of Systems:  A complete review of systems reviewed by me is negative with the exeption of what has been mentioned in the history of present illness.  CONSTITUTIONAL: NEGATIVE for weight gain/loss, fever, chills, sweats or night sweats, drug allergies.  EYES: NEGATIVE for changes in vision, blind spots, double vision.  ENT: NEGATIVE for ear pain, sore throat, sinus pain, post-nasal drip, runny nose, bloody nose  CARDIAC:  POSITIVE for  HTN and Heart Disease  NEUROLOGIC: NEGATIVE headaches, weakness or numbness in the arms or legs.  DERMATOLOGIC: NEGATIVE for rashes, new moles or change in mole(s)  PULMONARY:  POSITIVE for  SOB with activity  GASTROINTESTINAL: NEGATIVE for nausea or vomitting, loose or watery stools, fat or grease in stools, constipation, abdominal pain, bowel movements black in color or blood noted.  GENITOURINARY: NEGATIVE for pain during urination, blood in urine, urinating more frequently than usual, irregular menstrual periods.  MUSCULOSKELETAL: NEGATIVE for muscle pain, bone or joint pain, swollen joints.  ENDOCRINE: NEGATIVE for increased thirst or urination, diabetes.  LYMPHATIC: NEGATIVE for swollen lymph nodes, lumps or bumps in the breasts or nipple discharge.    Physical Examination:  Vitals: /76   Pulse 68   Ht 1.784 m (5' 10.25\")   Wt 134.7 kg (297 lb)   SpO2 94%   BMI 42.31 kg/m    BMI= Body mass index is 42.31 kg/m .         [place] Total Score 5/31/2019   Total score - [place] 8            GENERAL APPEARANCE: healthy, alert, no " distress and over weight  RESP: lungs clear to auscultation - no rales, rhonchi or wheezes  CV: regular rates and rhythm, normal S1 S2, no S3 or S4 and no murmur, click or rub  PSYCH: mentation appears normal and affect normal/bright    Impression/Plan:    Wei Kline is a pleasant 68 yo M with history of LILLY, obesity, hypothyroidism, CKD III who presents to establish care for LILLY and need for new equipment.    1.)  History of mild LILLY   - Suspect LILLY is more severe today given an interim ~35-40 lb weight gain   - Mild residual AHI on CPAP 9 and feeling of pressure being low   - Will place orders for replacement CPAP at auto-titrate 9-14 cm H2O    Plan as given to patient as above.    I have spent 60 minutes with this patient today in which greater than 50% of this time was spent in the counseling / coordination of care regarding LILLY.      Axel Page MD    CC: Geno Mcclelland

## 2019-05-31 NOTE — PATIENT INSTRUCTIONS

## 2019-06-10 ENCOUNTER — TELEPHONE (OUTPATIENT)
Dept: FAMILY MEDICINE | Facility: CLINIC | Age: 69
End: 2019-06-10

## 2019-06-10 DIAGNOSIS — I10 HYPERTENSION GOAL BP (BLOOD PRESSURE) < 140/90: ICD-10-CM

## 2019-06-10 RX ORDER — METOPROLOL TARTRATE 25 MG/1
12.5 TABLET, FILM COATED ORAL 2 TIMES DAILY
Qty: 90 TABLET | Refills: 3 | Status: SHIPPED | OUTPATIENT
Start: 2019-06-10 | End: 2020-03-19

## 2019-06-10 NOTE — TELEPHONE ENCOUNTER
Reason for call:  Other   Patient called regarding (reason for call): prescription - metoprolol tartrate (LOPRESSOR) 25 MG tablet  Additional comments: Pharmacy calling for clarification between dosage and amount.    Phone number to reach patient:  Other phone number:  [phone]*    Best Time:  Anytime    Can we leave a detailed message on this number?  Not Applicable

## 2019-06-10 NOTE — TELEPHONE ENCOUNTER
Quantity did not match dosing instructions (it was more than a 90 day supply)  Prescription resent with updated quantity for 90 day supply with refills    Eliza Gallo RN

## 2019-06-14 ENCOUNTER — DOCUMENTATION ONLY (OUTPATIENT)
Dept: SLEEP MEDICINE | Facility: CLINIC | Age: 69
End: 2019-06-14
Payer: COMMERCIAL

## 2019-06-14 NOTE — PROGRESS NOTES
Patient was offered choice of vendor and chose [facility].  Patient Wei Kline was set up at [facility]  on June 14, 2019. Patient received a [employer] DreamStation Auto. Pressures were set at 9-14 cm H2O.   Patient s ramp is off cm H2O for Off and FLEX/EPR is 2.  Patient received a Resmed Mask name: [id number]  Nasal mask size Small, heated tubing and heated humidifier.  Patient is not enrolled in the STM Program and does need to meet compliance. Patient has a follow up on 8/26/19 with Dr. Page.    Taylor Steward

## 2019-08-26 ENCOUNTER — OFFICE VISIT (OUTPATIENT)
Dept: SLEEP MEDICINE | Facility: CLINIC | Age: 69
End: 2019-08-26
Payer: COMMERCIAL

## 2019-08-26 VITALS
HEIGHT: 70 IN | HEART RATE: 97 BPM | WEIGHT: 297 LBS | DIASTOLIC BLOOD PRESSURE: 68 MMHG | BODY MASS INDEX: 42.52 KG/M2 | SYSTOLIC BLOOD PRESSURE: 132 MMHG | OXYGEN SATURATION: 95 %

## 2019-08-26 DIAGNOSIS — G47.33 OSA (OBSTRUCTIVE SLEEP APNEA): Primary | ICD-10-CM

## 2019-08-26 PROCEDURE — 99214 OFFICE O/P EST MOD 30 MIN: CPT | Performed by: FAMILY MEDICINE

## 2019-08-26 ASSESSMENT — MIFFLIN-ST. JEOR: SCORE: 2117.4

## 2019-08-26 NOTE — PROGRESS NOTES
Obstructive Sleep Apnea - PAP Follow-Up Visit:    Chief Complaint   Patient presents with     CPAP Follow Up     Pt reports that the machine is working well and he has no current sleep concerns.       Wei Kline comes in today for follow-up of their mild obstructive sleep apnea, managed with CPAP.     Pertinent PMHx of obesity, hypothyroidism, CKD III.    Diagnosed with mild LILLY on PSG performed 5/10/2010 at [facility] with weight 256 lbs, AHI 9.9, mean SpO2 91.9%, katlin SpO2 87%.  CPAP 9 cm H2O appeared effective, though no supine REM observed.  Frequent PVC's mentioned.    5/31/2019 - Initial consult to establish care for new supplies and replacement machine.  Appeared well controlled based on download and sleeping well, some feeling of low pressure at start of night on set pressure 9 cm H2O.  Interim weight gain of ~35-40 lbs.  A/P for new CPAP, auto-titrate 9-14 cm H2O.    Today - Here for compliance follow-up.  Doing well, no concerns.    CPAP download from 7/24/2019 - 8/22/2019 on auto-titrate 9-14 cm H2O.  Used 30/30 days, average usage 9 hours 5 minutes.  Pressure mean 9.8 cm H2O, 90th%ile 11.1 cm H2O.  AHI 3.1.      Problem List:  Patient Active Problem List    Diagnosis Date Noted     Morbid obesity (H) 03/22/2019     Priority: Medium     Elevation of level of transaminase or lactic acid dehydrogenase (LDH) 03/22/2019     Priority: Medium     Hyperglycemia 03/22/2019     Priority: Medium     Seborrheic keratosis 03/22/2019     Priority: Medium     Umbilical hernia without obstruction and without gangrene 03/22/2019     Priority: Medium     Gout, unspecified 03/03/2017     Priority: Medium     Chronic kidney disease, stage III (moderate) (H) 03/21/2016     Priority: Medium     Kidney stones 02/04/2015     Priority: Medium     Gout 01/16/2015     Priority: Medium     Diagnosed in 2009 at [facility], no flares since starting allopurinol.  Problem list name updated by automated process. Provider to  "review       Hypothyroidism 01/16/2015     Priority: Medium     History of Lyme disease 01/16/2015     Priority: Medium     Hyperlipidemia with target LDL less than 100 01/16/2015     Priority: Medium     Diagnosis updated by automated process. Provider to review and confirm.       Hypertension goal BP (blood pressure) < 140/90 01/16/2015     Priority: Medium     History of cardiomyopathy 01/16/2015     Priority: Medium     Had angiogram in 2002- clean arteries.  Thought to be viral.  Mild decrease in EF initially but last echo in 2014 was normal.       LILLY (obstructive sleep apnea) 01/16/2015     Priority: Medium     PSG performed 5/10/2010 at [facility] with weight 256 lbs, AHI 9.9, mean SpO2 91.9%, katlin SpO2 87%.  CPAP 9 cm H2O appeared effective, though no supine REM observed.  Frequent PVC's mentioned.            /68   Pulse 97   Ht 1.784 m (5' 10.25\")   Wt 134.7 kg (297 lb)   SpO2 95%   BMI 42.31 kg/m      Impression/Plan:    1.)  History of mild LILLY   - Suspect LILLY is more severe today given an interim ~35-40 lb weight gain   - Appears well controlled with auto-titrate 9-14 cm H2O   - Continue CPAP on currents settings.        Wei Kline will follow up in about 2 year(s).     Twenty-five minutes spent with patient, all of which were spent face-to-face counseling, consulting, coordinating plan of care.      Axel Page MD, MD    CC:  Geno Mcclelland  "

## 2019-08-26 NOTE — PATIENT INSTRUCTIONS

## 2019-10-05 ENCOUNTER — HEALTH MAINTENANCE LETTER (OUTPATIENT)
Age: 69
End: 2019-10-05

## 2019-11-13 ENCOUNTER — MYC MEDICAL ADVICE (OUTPATIENT)
Dept: INTERNAL MEDICINE | Facility: CLINIC | Age: 69
End: 2019-11-13

## 2019-11-13 DIAGNOSIS — J31.0 CHRONIC RHINITIS: ICD-10-CM

## 2019-11-13 RX ORDER — FLUTICASONE PROPIONATE 50 MCG
1-2 SPRAY, SUSPENSION (ML) NASAL DAILY
Qty: 18.2 ML | Refills: 3 | Status: SHIPPED | OUTPATIENT
Start: 2019-11-13 | End: 2023-03-21

## 2019-11-13 NOTE — TELEPHONE ENCOUNTER
"Routing refill request to provider for review/approval because:  Drug not active on patient's medication list    Per [employer] message \"I have a cold and had a prescription in the past for Fluticasone Propionate nasal spray 50mg. Could you please renew this prescription at [facility] in [place].\"    Requested Prescriptions   Pending Prescriptions Disp Refills     fluticasone (FLONASE) 50 MCG/ACT nasal spray       Sig: Spray 1-2 sprays into both nostrils daily       Inhaled Steroids Protocol Failed - 11/13/2019  8:07 AM        Failed - Medication is active on med list        Passed - Patient is age 12 or older        Passed - Recent (12 mo) or future (30 days) visit within the authorizing provider's specialty     Patient has had an office visit with the authorizing provider or a provider within the authorizing providers department within the previous 12 mos or has a future within next 30 days. See \"Patient Info\" tab in inbasket, or \"Choose Columns\" in Meds & Orders section of the refill encounter.              Arlene Walden RN  "

## 2019-12-09 ENCOUNTER — OFFICE VISIT (OUTPATIENT)
Dept: FAMILY MEDICINE | Facility: CLINIC | Age: 69
End: 2019-12-09
Payer: COMMERCIAL

## 2019-12-09 VITALS
HEART RATE: 73 BPM | TEMPERATURE: 97.8 F | RESPIRATION RATE: 20 BRPM | DIASTOLIC BLOOD PRESSURE: 66 MMHG | WEIGHT: 291 LBS | SYSTOLIC BLOOD PRESSURE: 128 MMHG | HEIGHT: 70 IN | BODY MASS INDEX: 41.66 KG/M2 | OXYGEN SATURATION: 96 %

## 2019-12-09 DIAGNOSIS — Z01.818 PREOP GENERAL PHYSICAL EXAM: Primary | ICD-10-CM

## 2019-12-09 DIAGNOSIS — M25.572 PAIN IN JOINT, ANKLE AND FOOT, LEFT: ICD-10-CM

## 2019-12-09 DIAGNOSIS — I10 HYPERTENSION GOAL BP (BLOOD PRESSURE) < 140/90: ICD-10-CM

## 2019-12-09 DIAGNOSIS — E03.9 ACQUIRED HYPOTHYROIDISM: ICD-10-CM

## 2019-12-09 DIAGNOSIS — G47.33 OSA (OBSTRUCTIVE SLEEP APNEA): ICD-10-CM

## 2019-12-09 DIAGNOSIS — N18.30 CHRONIC KIDNEY DISEASE, STAGE III (MODERATE) (H): ICD-10-CM

## 2019-12-09 DIAGNOSIS — E66.01 MORBID OBESITY (H): ICD-10-CM

## 2019-12-09 LAB
ANION GAP SERPL CALCULATED.3IONS-SCNC: 3 MMOL/L (ref 3–14)
BUN SERPL-MCNC: 29 MG/DL (ref 7–30)
CALCIUM SERPL-MCNC: 9.8 MG/DL (ref 8.5–10.1)
CHLORIDE SERPL-SCNC: 108 MMOL/L (ref 94–109)
CO2 SERPL-SCNC: 26 MMOL/L (ref 20–32)
CREAT SERPL-MCNC: 1.19 MG/DL (ref 0.66–1.25)
GFR SERPL CREATININE-BSD FRML MDRD: 61 ML/MIN/{1.73_M2}
GLUCOSE SERPL-MCNC: 87 MG/DL (ref 70–99)
POTASSIUM SERPL-SCNC: 4.5 MMOL/L (ref 3.4–5.3)
SODIUM SERPL-SCNC: 137 MMOL/L (ref 133–144)
TSH SERPL DL<=0.005 MIU/L-ACNC: 0.4 MU/L (ref 0.4–4)

## 2019-12-09 PROCEDURE — 99215 OFFICE O/P EST HI 40 MIN: CPT | Mod: 25 | Performed by: FAMILY MEDICINE

## 2019-12-09 PROCEDURE — 90662 IIV NO PRSV INCREASED AG IM: CPT | Performed by: FAMILY MEDICINE

## 2019-12-09 PROCEDURE — 90714 TD VACC NO PRESV 7 YRS+ IM: CPT | Performed by: FAMILY MEDICINE

## 2019-12-09 PROCEDURE — 36415 COLL VENOUS BLD VENIPUNCTURE: CPT | Performed by: FAMILY MEDICINE

## 2019-12-09 PROCEDURE — G0008 ADMIN INFLUENZA VIRUS VAC: HCPCS | Mod: 59 | Performed by: FAMILY MEDICINE

## 2019-12-09 PROCEDURE — 84443 ASSAY THYROID STIM HORMONE: CPT | Performed by: FAMILY MEDICINE

## 2019-12-09 PROCEDURE — 90471 IMMUNIZATION ADMIN: CPT | Performed by: FAMILY MEDICINE

## 2019-12-09 PROCEDURE — 80048 BASIC METABOLIC PNL TOTAL CA: CPT | Performed by: FAMILY MEDICINE

## 2019-12-09 PROCEDURE — 93000 ELECTROCARDIOGRAM COMPLETE: CPT | Performed by: FAMILY MEDICINE

## 2019-12-09 ASSESSMENT — MIFFLIN-ST. JEOR: SCORE: 2090.19

## 2019-12-09 NOTE — NURSING NOTE
"Chief Complaint   Patient presents with     Pre-Op Exam       Initial /66   Pulse 73   Temp 97.8  F (36.6  C) (Tympanic)   Resp 20   Ht 1.784 m (5' 10.25\")   Wt 132 kg (291 lb)   SpO2 96%   BMI 41.46 kg/m   Estimated body mass index is 41.46 kg/m  as calculated from the following:    Height as of this encounter: 1.784 m (5' 10.25\").    Weight as of this encounter: 132 kg (291 lb).    Patient presents to the clinic using No DME    Health Maintenance that is potentially due pending provider review:  Flu shot and tetanus    Possibly completing today per provider review.    Is there anyone who you would like to be able to receive your results? No  If yes have patient fill out MAGUE    Ingrid Patton CMA(AAMA)    "

## 2019-12-09 NOTE — PROGRESS NOTES
[facility]  [address] [place] [id number]  [phone]  Dept: [phone]    PRE-OP EVALUATION:  Today's date: 2019    Wei Kline (: 1950) presents for pre-operative evaluation assessment as requested by Dr. Castro.  He requires evaluation and anesthesia risk assessment prior to undergoing surgery/procedure for treatment of ankle repair .    Fax number for surgical facility:   Primary Physician: eGno Mcclelland  Type of Anesthesia Anticipated: General with popliteal block    Patient has a Health Care Directive or Living Will:  NO    Preop Questions 2019   Who is doing your surgery? Dr Sandro Castro   What are you having done? Left Ankle   Date of Surgery/Procedure: Dec 26 2019   Facility or Hospital where procedure/surgery will be performed: [facility]   1.  Do you have a history of Heart attack, stroke, stent, coronary bypass surgery, or other heart surgery? No   2.  Do you ever have any pain or discomfort in your chest? No   3.  Do you have a history of  Heart Failure? No   4.   Are you troubled by shortness of breath when:  walking on a level surface, or up a slight hill, or at night? No   5.  Do you currently have a cold, bronchitis or other respiratory infection? No   6.  Do you have a cough, shortness of breath, or wheezing? No   7.  Do you sometimes get pains in the calves of your legs when you walk? No   8. Do you or anyone in your family have previous history of blood clots? No   9.  Do you or does anyone in your family have a serious bleeding problem such as prolonged bleeding following surgeries or cuts? No   10. Have you ever had problems with anemia or been told to take iron pills? No   11. Have you had any abnormal blood loss such as black, tarry or bloody stools? No   12. Have you ever had a blood transfusion? No   13. Have you or any of your relatives ever had problems with anesthesia? No   14. Do you have sleep apnea, excessive  snoring or daytime drowsiness? YES - uses CPAP   15. Do you have any prosthetic heart valves? No   16. Do you have prosthetic joints? No         HPI:     s :Wei Kline is a 69 year old male with     L ankle chronic pain, having surgery to restabilize    Morbid obesity: hard on ankle, he knows this    Hypothyroid: recheck.      Sleep apnea: cpap nightly.  Apnea risk for surgery    ckd 3 : no known cause.  On ARB.  Check met panel    Htn: stable on meds. Update labs    No cp or sob. n o fever , no cough, no urine or stool changes.  No recent illness      MEDICAL HISTORY:     Patient Active Problem List    Diagnosis Date Noted     Morbid obesity (H) 03/22/2019     Priority: Medium     Hyperglycemia 03/22/2019     Priority: Medium     Seborrheic keratosis 03/22/2019     Priority: Medium     Umbilical hernia without obstruction and without gangrene 03/22/2019     Priority: Medium     Gout, unspecified 03/03/2017     Priority: Medium     Chronic kidney disease, stage III (moderate) (H) 03/21/2016     Priority: Medium     Kidney stones 02/04/2015     Priority: Medium     Gout 01/16/2015     Priority: Medium     Diagnosed in 2009 at [facility], no flares since starting allopurinol.  Problem list name updated by automated process. Provider to review       Hypothyroidism 01/16/2015     Priority: Medium     History of Lyme disease 01/16/2015     Priority: Medium     Hyperlipidemia with target LDL less than 100 01/16/2015     Priority: Medium     Diagnosis updated by automated process. Provider to review and confirm.       Hypertension goal BP (blood pressure) < 140/90 01/16/2015     Priority: Medium     History of cardiomyopathy 01/16/2015     Priority: Medium     Had angiogram in 2002- clean arteries.  Thought to be viral.  Mild decrease in EF initially but last echo in 2014 was normal.       LILLY (obstructive sleep apnea) 01/16/2015     Priority: Medium     PSG performed 5/10/2010 at [facility] with weight 256 lbs, AHI  9.9, mean SpO2 91.9%, katlin SpO2 87%.  CPAP 9 cm H2O appeared effective, though no supine REM observed.  Frequent PVC's mentioned.          Past Surgical History:   Procedure Laterality Date     arthroscopic knee surgery      bilateral knees (no replacement)     TONSILLECTOMY       Current Outpatient Medications   Medication Sig Dispense Refill     allopurinol (ZYLOPRIM) 300 MG tablet Take 1 tablet (300 mg) by mouth daily 90 tablet 4     aspirin 81 MG tablet Take 81 mg by mouth daily       atorvastatin (LIPITOR) 20 MG tablet Take 1 tablet (20 mg) by mouth daily 90 tablet 4     Cholecalciferol (VITAMIN D) 2000 UNITS CAPS Take 4,000 mg by mouth daily       fluticasone (FLONASE) 50 MCG/ACT nasal spray Spray 1-2 sprays into both nostrils daily 18.2 mL 3     FOLIC ACID PO Take 1 mg by mouth daily With DHA       levothyroxine (SYNTHROID/LEVOTHROID) 137 MCG tablet TAKE 1 TABLET (137 MCG) BY MOUTH DAILY 90 tablet 4     metoprolol tartrate (LOPRESSOR) 25 MG tablet Take 0.5 tablets (12.5 mg) by mouth 2 times daily 90 tablet 3     Multiple Vitamin (MULTI VITAMIN DAILY PO) Take 1 tablet by mouth daily       order for DME Equipment being ordered: Cpap, mask, tubing, and all associated supplies 1 each 0     telmisartan (MICARDIS) 80 MG tablet Take 1 tablet (80 mg) by mouth daily Telmisartan generic 90 tablet 3     vitamin C (ASCORBIC ACID) 1000 MG TABS Take 1,000 mg by mouth daily           No Known Allergies   Latex Allergy: NO    Social History     Tobacco Use     Smoking status: Former Smoker     Packs/day: 0.50     Years: 5.00     Pack years: 2.50     Types: Cigarettes     Start date: 1955     Last attempt to quit: 1960     Years since quittin.9     Smokeless tobacco: Never Used   Substance Use Topics     Alcohol use: Yes     Comment: occasional     History   Drug Use No       REVIEW OF SYSTEMS:   See above    EXAM:   /66   Pulse 73   Temp 97.8  F (36.6  C) (Tympanic)   Resp 20   Ht 1.784 m (5'  "10.25\")   Wt 132 kg (291 lb)   SpO2 96%   BMI 41.46 kg/m    GEN: Alert and oriented, in no acute distress  CV: RRR, no murmur  RESP: lungs clear bilaterally, good effort  EXT: no edema or lesions noted in lower extremities  Neck: neck supple without mass or lymphadenopathy        DIAGNOSTICS:   EKG: NSR, no ST or T wave changes.  Normal axis, intervals.  No Q waves.  Met panel pending        IMPRESSION:   Well exam  L ankle chronic pain, having surgery to restabilize  Morbid obesity: hard on ankle, he knows this  Hypothyroid: recheck.    Sleep apnea: cpap nightly.  Apnea risk for surgery  ckd 3 : no known cause.  On ARB.  Check met panel  Htn: stable on meds.     Be aware of apnea risk, morbid obesity with cpap nightly use.  Will stop asa     The proposed surgical procedure is considered INTERMEDIATE risk.    REVISED CARDIAC RISK INDEX  The patient has the following serious cardiovascular risks for perioperative complications such as (MI, PE, VFib and 3  AV Block):  No serious cardiac risks  INTERPRETATION: 0 risks: Class I (very low risk - 0.4% complication rate)    The patient has the following additional risks for perioperative complications:  Morbid obesity  Sleep apnea  CKD3        RECOMMENDATIONS:         APPROVAL GIVEN to proceed with proposed procedure, without further diagnostic evaluation     Take apnea precautions.    Will need to be  Careful on weight bearing issues on recovery given BMI    Signed Electronically by: Dallas Escobar MD    Copy of this evaluation report is provided to requesting physician.      Revised Cardiac Risk Index  "

## 2019-12-23 ENCOUNTER — ANESTHESIA EVENT (OUTPATIENT)
Dept: SURGERY | Facility: CLINIC | Age: 69
End: 2019-12-23
Payer: COMMERCIAL

## 2019-12-23 ASSESSMENT — LIFESTYLE VARIABLES: TOBACCO_USE: 1

## 2019-12-23 NOTE — ANESTHESIA PREPROCEDURE EVALUATION
Anesthesia Pre-Procedure Evaluation    Patient: Wei Kline   MRN: [id number] : 1950          Preoperative Diagnosis: left ankle instability,arthritis gastroc equinus    Procedure(s):  Ankle Arthroscopy and Debridement,Cartilage Repair Procedure,Medial and Lateral Ligament Repair,Stabilization,Gastroc Recession    Past Medical History:   Diagnosis Date     Gout, unspecified 2015    Diagnosed in  at [facility], no flares since starting allopurinol.     History of cardiomyopathy 2015    Had angiogram in - clean arteries.  Thought to be viral.  Mild decrease in EF initially but last echo in  was normal.     History of Lyme disease 2015     Hyperlipidemia LDL goal < 100 2015     Hypertension goal BP (blood pressure) < 140/90 2015     Hypothyroidism 2015     Past Surgical History:   Procedure Laterality Date     arthroscopic knee surgery      bilateral knees (no replacement)     TONSILLECTOMY         Anesthesia Evaluation     . Pt has had prior anesthetic. Type: General           ROS/MED HX    ENT/Pulmonary:     (+)sleep apnea, tobacco use, Past use uses CPAP , . .    Neurologic:     (+)other neuro Lyme disease    Cardiovascular: Comment: Cardiomyopathy    (+) Dyslipidemia, hypertension----. : . . . :. . Previous cardiac testing date:results:date: results:ECG reviewed date:19 results:Sinus Rhythm   WITHIN NORMAL LIMITS date: results:          METS/Exercise Tolerance:  >4 METS   Hematologic:  - neg hematologic  ROS       Musculoskeletal:  - neg musculoskeletal ROS      Lower extremity injury: controlled on medication.   GI/Hepatic:  - neg GI/hepatic ROS       Renal/Genitourinary:     (+) chronic renal disease, type: CRI, Nephrolithiasis ,       Endo:     (+) thyroid problem hypothyroidism, Obesity (Morbid), .      Psychiatric:  - neg psychiatric ROS       Infectious Disease:  - neg infectious disease ROS       Malignancy:      - no malignancy   Other:    - neg  "other ROS                      Physical Exam  Normal systems: cardiovascular, pulmonary and dental    Airway   Mallampati: II  TM distance: >3 FB  Neck ROM: full    Dental     Cardiovascular       Pulmonary             Lab Results   Component Value Date    WBC 7.4 03/22/2019    HGB 15.1 03/22/2019    HCT 44.8 03/22/2019     03/22/2019     12/09/2019    POTASSIUM 4.5 12/09/2019    CHLORIDE 108 12/09/2019    CO2 26 12/09/2019    BUN 29 12/09/2019    CR 1.19 12/09/2019    GLC 87 12/09/2019    FALVIA 9.8 12/09/2019    ALBUMIN 3.6 03/22/2019    PROTTOTAL 7.7 03/22/2019    ALT 34 03/22/2019    AST 31 03/22/2019    ALKPHOS 87 03/22/2019    BILITOTAL 0.5 03/22/2019    TSH 0.40 12/09/2019       Preop Vitals  BP Readings from Last 3 Encounters:   12/09/19 128/66   08/26/19 132/68   05/31/19 120/76    Pulse Readings from Last 3 Encounters:   12/09/19 73   08/26/19 97   05/31/19 68      Resp Readings from Last 3 Encounters:   12/09/19 20   05/28/19 24   03/22/19 18    SpO2 Readings from Last 3 Encounters:   12/09/19 96%   08/26/19 95%   05/31/19 94%      Temp Readings from Last 1 Encounters:   12/09/19 36.6  C (97.8  F) (Tympanic)    Ht Readings from Last 1 Encounters:   12/09/19 1.784 m (5' 10.25\")      Wt Readings from Last 1 Encounters:   12/09/19 132 kg (291 lb)    Estimated body mass index is 41.46 kg/m  as calculated from the following:    Height as of 12/9/19: 1.784 m (5' 10.25\").    Weight as of 12/9/19: 132 kg (291 lb).       Anesthesia Plan      History & Physical Review      ASA Status:  3 .    NPO Status:  > 6 hours    Plan for MAC, ETT, General and For Post-op pain in coordination with surgeon with Propofol and Intravenous induction. Reason for MAC:  Other - see comments  PONV prophylaxis:  Dexamethasone or Solumedrol and Ondansetron (or other 5HT-3)  Additional equipment: Videolaryngoscope      Postoperative Care  Postoperative pain management:  IV analgesics, Oral pain medications and Peripheral nerve " block (Single Shot).      Consents  Anesthetic plan, risks, benefits and alternatives discussed with:  Patient..                 RUSSEL Alejandro CRNA

## 2019-12-26 ENCOUNTER — HOSPITAL ENCOUNTER (OUTPATIENT)
Facility: CLINIC | Age: 69
Discharge: HOME | End: 2019-12-26
Attending: PODIATRIST | Admitting: PODIATRIST
Payer: COMMERCIAL

## 2019-12-26 ENCOUNTER — ANESTHESIA (OUTPATIENT)
Dept: SURGERY | Facility: CLINIC | Age: 69
End: 2019-12-26
Payer: COMMERCIAL

## 2019-12-26 VITALS
OXYGEN SATURATION: 95 % | HEART RATE: 65 BPM | WEIGHT: 300 LBS | HEIGHT: 70 IN | RESPIRATION RATE: 16 BRPM | SYSTOLIC BLOOD PRESSURE: 117 MMHG | DIASTOLIC BLOOD PRESSURE: 66 MMHG | BODY MASS INDEX: 42.95 KG/M2 | TEMPERATURE: 98.2 F

## 2019-12-26 DIAGNOSIS — G89.18 ACUTE POST-OPERATIVE PAIN: Primary | ICD-10-CM

## 2019-12-26 PROCEDURE — 25000128 H RX IP 250 OP 636: Performed by: NURSE ANESTHETIST, CERTIFIED REGISTERED

## 2019-12-26 PROCEDURE — 25000125 ZZHC RX 250: Performed by: NURSE ANESTHETIST, CERTIFIED REGISTERED

## 2019-12-26 PROCEDURE — 37000009 ZZH ANESTHESIA TECHNICAL FEE, EACH ADDTL 15 MIN: Performed by: PODIATRIST

## 2019-12-26 PROCEDURE — C1762 CONN TISS, HUMAN(INC FASCIA): HCPCS | Performed by: PODIATRIST

## 2019-12-26 PROCEDURE — 71000027 ZZH RECOVERY PHASE 2 EACH 15 MINS: Performed by: PODIATRIST

## 2019-12-26 PROCEDURE — 25000566 ZZH SEVOFLURANE, EA 15 MIN: Performed by: PODIATRIST

## 2019-12-26 PROCEDURE — 36000063 ZZH SURGERY LEVEL 4 EA 15 ADDTL MIN: Performed by: PODIATRIST

## 2019-12-26 PROCEDURE — C1713 ANCHOR/SCREW BN/BN,TIS/BN: HCPCS | Performed by: PODIATRIST

## 2019-12-26 PROCEDURE — 71000015 ZZH RECOVERY PHASE 1 LEVEL 2 EA ADDTL HR: Performed by: PODIATRIST

## 2019-12-26 PROCEDURE — 25800030 ZZH RX IP 258 OP 636: Performed by: NURSE ANESTHETIST, CERTIFIED REGISTERED

## 2019-12-26 PROCEDURE — 36000093 ZZH SURGERY LEVEL 4 1ST 30 MIN: Performed by: PODIATRIST

## 2019-12-26 PROCEDURE — 71000014 ZZH RECOVERY PHASE 1 LEVEL 2 FIRST HR: Performed by: PODIATRIST

## 2019-12-26 PROCEDURE — 27210794 ZZH OR GENERAL SUPPLY STERILE: Performed by: PODIATRIST

## 2019-12-26 PROCEDURE — 25000132 ZZH RX MED GY IP 250 OP 250 PS 637: Performed by: NURSE ANESTHETIST, CERTIFIED REGISTERED

## 2019-12-26 PROCEDURE — 25000128 H RX IP 250 OP 636: Performed by: PODIATRIST

## 2019-12-26 PROCEDURE — 40000306 ZZH STATISTIC PRE PROC ASSESS II: Performed by: PODIATRIST

## 2019-12-26 PROCEDURE — 27210995 ZZH RX 272: Performed by: PODIATRIST

## 2019-12-26 PROCEDURE — 37000008 ZZH ANESTHESIA TECHNICAL FEE, 1ST 30 MIN: Performed by: PODIATRIST

## 2019-12-26 DEVICE — IMPLANTABLE DEVICE: Type: IMPLANTABLE DEVICE | Site: ANKLE | Status: FUNCTIONAL

## 2019-12-26 DEVICE — GRAFT BIOCARILAGE EXTRACELLULAR MATRIX 1ML ABS-1010-BC: Type: IMPLANTABLE DEVICE | Site: ANKLE | Status: FUNCTIONAL

## 2019-12-26 DEVICE — IMP KIT REPAIR LIGAMENT AUGMENTATION INT BRACE AR-1688-CP: Type: IMPLANTABLE DEVICE | Site: ANKLE | Status: FUNCTIONAL

## 2019-12-26 RX ORDER — FENTANYL CITRATE 50 UG/ML
25-50 INJECTION, SOLUTION INTRAMUSCULAR; INTRAVENOUS
Status: DISCONTINUED | OUTPATIENT
Start: 2019-12-26 | End: 2019-12-26 | Stop reason: HOSPADM

## 2019-12-26 RX ORDER — PROPOFOL 10 MG/ML
INJECTION, EMULSION INTRAVENOUS PRN
Status: DISCONTINUED | OUTPATIENT
Start: 2019-12-26 | End: 2019-12-26

## 2019-12-26 RX ORDER — SODIUM CHLORIDE, SODIUM LACTATE, POTASSIUM CHLORIDE, CALCIUM CHLORIDE 600; 310; 30; 20 MG/100ML; MG/100ML; MG/100ML; MG/100ML
INJECTION, SOLUTION INTRAVENOUS CONTINUOUS
Status: DISCONTINUED | OUTPATIENT
Start: 2019-12-26 | End: 2019-12-26 | Stop reason: HOSPADM

## 2019-12-26 RX ORDER — KETOROLAC TROMETHAMINE 30 MG/ML
INJECTION, SOLUTION INTRAMUSCULAR; INTRAVENOUS PRN
Status: DISCONTINUED | OUTPATIENT
Start: 2019-12-26 | End: 2019-12-26

## 2019-12-26 RX ORDER — HYDROXYZINE HYDROCHLORIDE 25 MG/1
25 TABLET, FILM COATED ORAL EVERY 4 HOURS PRN
Qty: 26 TABLET | Refills: 0 | Status: SHIPPED | OUTPATIENT
Start: 2019-12-26 | End: 2020-06-02

## 2019-12-26 RX ORDER — LIDOCAINE 40 MG/G
CREAM TOPICAL
Status: DISCONTINUED | OUTPATIENT
Start: 2019-12-26 | End: 2019-12-26 | Stop reason: HOSPADM

## 2019-12-26 RX ORDER — CEFAZOLIN SODIUM 1 G/50ML
1 INJECTION, SOLUTION INTRAVENOUS SEE ADMIN INSTRUCTIONS
Status: DISCONTINUED | OUTPATIENT
Start: 2019-12-26 | End: 2019-12-26 | Stop reason: HOSPADM

## 2019-12-26 RX ORDER — CEFAZOLIN SODIUM IN 0.9 % NACL 3 G/100 ML
3 INTRAVENOUS SOLUTION, PIGGYBACK (ML) INTRAVENOUS
Status: COMPLETED | OUTPATIENT
Start: 2019-12-26 | End: 2019-12-26

## 2019-12-26 RX ORDER — GABAPENTIN 300 MG/1
300 CAPSULE ORAL ONCE
Status: COMPLETED | OUTPATIENT
Start: 2019-12-26 | End: 2019-12-26

## 2019-12-26 RX ORDER — FENTANYL CITRATE 50 UG/ML
INJECTION, SOLUTION INTRAMUSCULAR; INTRAVENOUS PRN
Status: DISCONTINUED | OUTPATIENT
Start: 2019-12-26 | End: 2019-12-26

## 2019-12-26 RX ORDER — HYDRALAZINE HYDROCHLORIDE 20 MG/ML
2.5-5 INJECTION INTRAMUSCULAR; INTRAVENOUS EVERY 10 MIN PRN
Status: DISCONTINUED | OUTPATIENT
Start: 2019-12-26 | End: 2019-12-26 | Stop reason: HOSPADM

## 2019-12-26 RX ORDER — ALBUTEROL SULFATE 0.83 MG/ML
2.5 SOLUTION RESPIRATORY (INHALATION) EVERY 4 HOURS PRN
Status: DISCONTINUED | OUTPATIENT
Start: 2019-12-26 | End: 2019-12-26 | Stop reason: HOSPADM

## 2019-12-26 RX ORDER — LIDOCAINE HYDROCHLORIDE AND EPINEPHRINE 15; 5 MG/ML; UG/ML
INJECTION, SOLUTION EPIDURAL PRN
Status: DISCONTINUED | OUTPATIENT
Start: 2019-12-26 | End: 2019-12-26

## 2019-12-26 RX ORDER — DEXAMETHASONE SODIUM PHOSPHATE 4 MG/ML
INJECTION, SOLUTION INTRA-ARTICULAR; INTRALESIONAL; INTRAMUSCULAR; INTRAVENOUS; SOFT TISSUE PRN
Status: DISCONTINUED | OUTPATIENT
Start: 2019-12-26 | End: 2019-12-26

## 2019-12-26 RX ORDER — NALOXONE HYDROCHLORIDE 0.4 MG/ML
.1-.4 INJECTION, SOLUTION INTRAMUSCULAR; INTRAVENOUS; SUBCUTANEOUS
Status: DISCONTINUED | OUTPATIENT
Start: 2019-12-26 | End: 2019-12-26 | Stop reason: HOSPADM

## 2019-12-26 RX ORDER — OXYCODONE AND ACETAMINOPHEN 5; 325 MG/1; MG/1
1 TABLET ORAL
Status: DISCONTINUED | OUTPATIENT
Start: 2019-12-26 | End: 2019-12-26 | Stop reason: HOSPADM

## 2019-12-26 RX ORDER — ONDANSETRON 2 MG/ML
4 INJECTION INTRAMUSCULAR; INTRAVENOUS EVERY 30 MIN PRN
Status: COMPLETED | OUTPATIENT
Start: 2019-12-26 | End: 2019-12-26

## 2019-12-26 RX ORDER — ACETAMINOPHEN 325 MG/1
975 TABLET ORAL ONCE
Status: COMPLETED | OUTPATIENT
Start: 2019-12-26 | End: 2019-12-26

## 2019-12-26 RX ORDER — ONDANSETRON 4 MG/1
4 TABLET, ORALLY DISINTEGRATING ORAL EVERY 30 MIN PRN
Status: COMPLETED | OUTPATIENT
Start: 2019-12-26 | End: 2019-12-26

## 2019-12-26 RX ORDER — HYDROMORPHONE HYDROCHLORIDE 1 MG/ML
.3-.5 INJECTION, SOLUTION INTRAMUSCULAR; INTRAVENOUS; SUBCUTANEOUS EVERY 10 MIN PRN
Status: DISCONTINUED | OUTPATIENT
Start: 2019-12-26 | End: 2019-12-26 | Stop reason: HOSPADM

## 2019-12-26 RX ORDER — ROPIVACAINE HYDROCHLORIDE 5 MG/ML
INJECTION, SOLUTION EPIDURAL; INFILTRATION; PERINEURAL PRN
Status: DISCONTINUED | OUTPATIENT
Start: 2019-12-26 | End: 2019-12-26

## 2019-12-26 RX ORDER — MEPERIDINE HYDROCHLORIDE 25 MG/ML
12.5 INJECTION INTRAMUSCULAR; INTRAVENOUS; SUBCUTANEOUS
Status: DISCONTINUED | OUTPATIENT
Start: 2019-12-26 | End: 2019-12-26 | Stop reason: HOSPADM

## 2019-12-26 RX ORDER — DEXAMETHASONE SODIUM PHOSPHATE 4 MG/ML
4 INJECTION, SOLUTION INTRA-ARTICULAR; INTRALESIONAL; INTRAMUSCULAR; INTRAVENOUS; SOFT TISSUE EVERY 10 MIN PRN
Status: DISCONTINUED | OUTPATIENT
Start: 2019-12-26 | End: 2019-12-26 | Stop reason: HOSPADM

## 2019-12-26 RX ORDER — OXYCODONE AND ACETAMINOPHEN 5; 325 MG/1; MG/1
1-2 TABLET ORAL EVERY 4 HOURS PRN
Qty: 26 TABLET | Refills: 0 | Status: SHIPPED | OUTPATIENT
Start: 2019-12-26 | End: 2020-06-02

## 2019-12-26 RX ADMIN — ONDANSETRON 4 MG: 2 INJECTION INTRAMUSCULAR; INTRAVENOUS at 14:47

## 2019-12-26 RX ADMIN — PROPOFOL 200 MG: 10 INJECTION, EMULSION INTRAVENOUS at 13:06

## 2019-12-26 RX ADMIN — LIDOCAINE HYDROCHLORIDE AND EPINEPHRINE 5 ML: 15; 5 INJECTION, SOLUTION EPIDURAL at 11:48

## 2019-12-26 RX ADMIN — MIDAZOLAM 2 MG: 1 INJECTION INTRAMUSCULAR; INTRAVENOUS at 13:03

## 2019-12-26 RX ADMIN — DEXAMETHASONE SODIUM PHOSPHATE 8 MG: 4 INJECTION, SOLUTION INTRA-ARTICULAR; INTRALESIONAL; INTRAMUSCULAR; INTRAVENOUS; SOFT TISSUE at 13:06

## 2019-12-26 RX ADMIN — ROPIVACAINE HYDROCHLORIDE 30 ML: 5 INJECTION, SOLUTION EPIDURAL; INFILTRATION; PERINEURAL at 11:49

## 2019-12-26 RX ADMIN — HYDROMORPHONE HYDROCHLORIDE 1 MG: 1 INJECTION, SOLUTION INTRAMUSCULAR; INTRAVENOUS; SUBCUTANEOUS at 14:24

## 2019-12-26 RX ADMIN — ONDANSETRON 4 MG: 2 INJECTION INTRAMUSCULAR; INTRAVENOUS at 17:07

## 2019-12-26 RX ADMIN — SODIUM CHLORIDE, POTASSIUM CHLORIDE, SODIUM LACTATE AND CALCIUM CHLORIDE: 600; 310; 30; 20 INJECTION, SOLUTION INTRAVENOUS at 11:07

## 2019-12-26 RX ADMIN — FENTANYL CITRATE 100 MCG: 50 INJECTION, SOLUTION INTRAMUSCULAR; INTRAVENOUS at 13:12

## 2019-12-26 RX ADMIN — FENTANYL CITRATE 50 MCG: 50 INJECTION, SOLUTION INTRAMUSCULAR; INTRAVENOUS at 11:40

## 2019-12-26 RX ADMIN — ACETAMINOPHEN 975 MG: 325 TABLET, FILM COATED ORAL at 10:51

## 2019-12-26 RX ADMIN — Medication 3 G: at 13:06

## 2019-12-26 RX ADMIN — GABAPENTIN 300 MG: 300 CAPSULE ORAL at 10:51

## 2019-12-26 RX ADMIN — MIDAZOLAM 2 MG: 1 INJECTION INTRAMUSCULAR; INTRAVENOUS at 11:40

## 2019-12-26 RX ADMIN — ROPIVACAINE HYDROCHLORIDE 10 ML: 5 INJECTION, SOLUTION EPIDURAL; INFILTRATION; PERINEURAL at 11:50

## 2019-12-26 RX ADMIN — LIDOCAINE HYDROCHLORIDE 0.2 ML: 10 INJECTION, SOLUTION EPIDURAL; INFILTRATION; INTRACAUDAL; PERINEURAL at 11:07

## 2019-12-26 RX ADMIN — FENTANYL CITRATE 100 MCG: 50 INJECTION, SOLUTION INTRAMUSCULAR; INTRAVENOUS at 13:54

## 2019-12-26 RX ADMIN — LIDOCAINE HYDROCHLORIDE 50 MG: 10 INJECTION, SOLUTION EPIDURAL; INFILTRATION; INTRACAUDAL; PERINEURAL at 13:06

## 2019-12-26 RX ADMIN — FENTANYL CITRATE 50 MCG: 50 INJECTION, SOLUTION INTRAMUSCULAR; INTRAVENOUS at 13:03

## 2019-12-26 RX ADMIN — KETOROLAC TROMETHAMINE 30 MG: 30 INJECTION, SOLUTION INTRAMUSCULAR at 14:47

## 2019-12-26 ASSESSMENT — MIFFLIN-ST. JEOR: SCORE: 2127.04

## 2019-12-26 NOTE — OP NOTE
[facility] ORTHOPEDICS OPERATIVE REPORT  Operative Report - Orthopedics  Wei Kline,  1950, MRN [id number]    Surgery Date: 19    PCP: Geno Mcclelland, [phone]   Code status:  No Order       OPERATION SITE:  [facility] Operating Room       OPERATIVE REPORT  DR. SANDRO WEATHERS  FOOT & ANKLE SURGEON  [facility] ORTHOPEDICS    DATE OF PROCEDURE: 19    SITE: [facility] Operating Room    SURGEON: Dr. Sandro Weathers - [facility]    ASSISTANT: Richy Todd [facility]III[facility] -  Surgeon      Pre-Operative Diagnosis:  1.  Left ankle mechanical lateral instability/insufficiency - rotational  2.  Left ankle impingement and OA changes  3.  Left lateral peroneal tendon damage/tendinopathy  4.  Adaptive gastrocnemius equinus    Post-Operative Diagnosis:  1.  Left ankle mechanical lateral instability/insufficiency - rotational lateral based  2.  Left ankle impingement and OA changes  3.  Left lateral peroneal tendon damage/tendinopathy  4.  Adaptive gastrocnemius equinus    Procedures Performed:  1.   Left ankle lateral collateral ligament repair/reconstruction and internal augmentation  2.  Left ankle arthroscopy - extensive debridement  3.  Left ankle anterior distal tibial partial excision  4.  Left ankle talus dorsal lateral OCD repair - microfracture treatment  5.  Gastrocnemius recession modified Marilu left ankle posterior  6.  Postoperative splint application below the knee ankle neutral, fiberglass material    Anesthesia: General Anesthesia Care with Local/Regional  Hemostasis: Left Thigh Tourniquet at 300 mmHg  EBL: < 10 mL  Findings:  Left ankle with large amounts of impingement along with dorsal medial larger area of complete cartilage loss than expected.  Gross lateral rotational instability along with compromise of the peroneal brevis tendon explanatory for the degenerative ankle changes and OA changes.  Implants:  Arthrex left  lateral internal brace/IB system with 4.75 anchor fiber tape and 3.5 anchor and additional fibertak all suture anchors.  Peroneal repair conducted with 2-0 fiberwire and 2-0 vicryl.  Specimens: None    Indications for the Operation:  The patient has been seen and evaluated in clinic for the above-mentioned diagnoses.  They have failed to respond to nonoperative care measures and/or surgical care for condition was indicated.  They have elected to proceed as recommended/indicated with surgical care after a thorough discussion of the associated pros, cons, risks and benefits of the operations as well as the postoperative course and details.  All associated questions were answered.  Verbal and written form informed consent was obtained.  Please see additional information within the clinical notes.    Description of the Procedure:  Patient was seen and evaluated in the preoperative holding area.  The surgical site was marked.  The consent was signed.  The H&P was updated/reviewed.  Patient was transported from the preoperative holding area to the surgical suite.  The patient was placed on the operative table.  Anesthesia was obtained.  Antibiotics were administered via IV.  Tourniquet was applied.  The operative extremity was prepped and draped sterilely.  Then, a timeout was performed to identify the proper patient, surgical site and the procedures to be performed.  Local anesthetic was infiltrated about the operative margins for regional blockade utilizing a one-to-one mixture of 2% lidocaine plain and 0.5% Marcaine plain approximately 20 cc of the mixture was utilized.  The foot/ankle was exsanguinated, and the tourniquet was inflated.    Attention was then directed to the left ankle.  Insufflation of the ankle conducted with 15 cc of sterile injectable saline.  Then, the counter the thigh hansen under the drape the external noninvasive ankle distractor was applied.  The anterior medial and anterior lateral portals of  the ankle were established by anatomical safe zones.  These were then utilized interchangeably as both viewing and working portals.  A 3.5 full-radius resector was utilized from lateral to medial and medial to lateral.  Extensive debridement of the ankle was necessary secondary to the acute and chronic impingement lesions as well as inferior tib-fib lesion.  Dorsal lateral osteochondral defect appreciated with delamination.  A near 1.0 cm diameter lesion.  The lesion was addressed arthroscopically first with curettage and microfracture treatment.  Additional extension and arthrotomy of the lateral portal was conducted.  Additional debridement curettage and OCD enhance microfracture was conducted.  The sites were irrigated and lavaged.  Pre-and post debridement arthroscopic images were obtained and saved to the system.  Closure completed here with 2-0 Vicryl and 3-0 nylon.  The external ankle distractor was then removed and the thigh hansen removed.  The ankle portals were reapproximated with 3-0 nylon as well.  Then to address the lateral ligament and peroneal tendon pathology a curvilinear distal incision made with #15 blade following the peroneal anatomical margin.  This was dissected down in layers with neurovascular identification and retraction.  The peroneal sheath was opened up and found with collected fluid and significant damage flattening and tendinopathy of both the peroneal brevis and peroneal longus.  These were debrided via micro-tenotomy and re-tubularized.  Then after repair of each individual tendon they were tenodesed from the brevis to the longus with an interlocking running 2-0 FiberWire suture for combined power and repair.  Then the underlying lateral ligaments were found to be with significant mechanical compromise explanatory for the significant mechanical ankle instability and rotational instability of the ankle, lateral-based.  These were dissected and reflected off the distal anterior and  inferior fibula.  The fibula was then partially decorticated with a rasp and a rongeur.  Drill holes were placed for the fiber tack anchors.  These were oversewn with a fiber tape into the native calcaneofibular ligament and the native anterior talofibular ligament with a interwoven return to the lateral periosteum of the fibula where imbrication sutures were placed.  Then to augment supplement this a drill hole was placed in the lateral nonarticular talus with a forward 7 5 possible lock anchor placed this was stranded with double-ended collagen coated fiber take back to the fibula drill hole was placed and a 3 5 anchor was then placed under physiologic tension with additional tensioning of the fiber tack anchors with a modified and enhanced internally braced Broström Mcnulty procedure.  Additional reinforcement with 2-0 Vicryl and 2-0 FiberWire was conducted laterally with much improved mechanical instability.  Medial dynamic stress was applied without significant medial widening.  Secondary adapted gastrocnemius equinus was appreciated.  With the knee fully extended and the external rotation of the lower limb a posterior central approach was made but this test done of the deep fascia which was released and incised.  The gastric aponeurosis was isolated from medial to lateral and released from medial to lateral and lateral to central with #15 blade thus improving the dorsiflexion available at the ankle with the foot the neutral position to a +8 to 10 degrees on the operative table much improved post release.    Following this, thorough irrigation of the surgical sites was conducted.  Layered, anatomic closure completed with 2-0 Vicryl, 3-0 Vicryl and 3-0 nylon with careful apposition of the skin and surfaces for primary healing.  A compressive sterile splint/dressing was applied.  Vascular status was intact after deflation of the tourniquet.  SPLINT: A posterior fiberglass, below the knee splint was applied with the  ankle in the neutral position.  COMPLICATIONS: No direct complications encountered throughout the case.    The patient tolerated the procedure & anesthesia well.  They were transported from the operative suite to the postoperative holding area.  The patient was given postoperative orders as well as specific postoperative instructions which were reviewed by the nursing staff.  Orders were placed for weightbearing status/activity, postoperative oral pain management, DVT prophylaxis measures both with mechanical and medicinal measures reviewed.  Splint/dressing care measures were reviewed as well as appropriate cryotherapy measures and nutrition.  Postoperative follow-up to be conducted in the next 10-14 days for outpatient clinical follow-up in the Orthopedic clinic at [facility].  If concerns or questions arise or develop they will contact our clinic and postoperative contact numbers provided.  Case details and post-operative care requirements reviewed with family/support present today.  Additionally, a detailed postoperative instruction sheet was provided to the patient and family.  All additional questions were answered postoperatively.    Please note that this report was completed with the assistance of voice recognition and transcription services.  Although every effort has been made to correct and avoid errors, errors may remain.    Dr. Sandro Castro, ZAHRA, FACFAS  Foot & Ankle Surgeon/Specialist  [facility]          CC: [facility], Dr. Castro's Clinical Team

## 2019-12-26 NOTE — ANESTHESIA PROCEDURE NOTES
Peripheral nerve/Neuraxial procedure note : sciatic, saphenous and popliteal  Pre-Procedure  Performed by  Mayelin Luna APRN CRNA   Location: pre-op    Procedure Times:12/26/2019 11:38 AM and 12/26/2019 11:53 AM  Pre-Anesthestic Checklist: patient identified, IV checked, site marked, risks and benefits discussed, informed consent, monitors and equipment checked, pre-op evaluation, at physician/surgeon's request and post-op pain management    Timeout  Correct Patient: Yes   Correct Procedure: Yes   Correct Site: Yes   Correct Laterality: Yes   Correct Position: Yes   Site Marked: Yes   .   Procedure Documentation    .    Procedure: sciatic, saphenous and popliteal, left.   Patient Position:prone Local skin infiltrated with mL of 1% lidocaine.    Ultrasound used to identify targeted nerve, plexus, or vascular marker and placed a needle adjacent to it., Ultrasound was used to visualize the spread of the anesthetic in close proximity to the above stated nerve. A permanent image is entered into the patient's record.  Patient Prep/Sterile Barriers; mask, sterile gloves, chlorhexidine gluconate and isopropyl alcohol, patient draped.  Nerve Stim: Initial Level 1 mA.  Lowest motor response 0.44 mA..  Needle: insulated, short bevel   Needle Gauge: 20.    Needle Length (Inches) 4   .        Assessment/Narrative  Paresthesias: No.  Injection made incrementally with aspirations every 5 mL..  The placement was negative for: blood aspirated, painful injection and site bleeding.  Bolus given via needle. No blood aspirated via catheter.   Secured via.   Complications: none. Test dose of 5 mL lidocaine 2% w/ 1:200,000 epinephrine at 11:48.  Test dose negative for signs of intravascular, subdural or intrathecal injection. Comments:  Total volume injected at Sciatic nerve:30    Total volume injected at Saphenous Nerve:10

## 2019-12-26 NOTE — OR NURSING
Woke up restless and delerious for about 15 Minutes.  Recovered and uneventful recovery after that.  Up to BR to urinate with success.  Up to BrR with success.

## 2019-12-26 NOTE — BRIEF OP NOTE
[facility] OR   Brief Operative Note    Pre-operative diagnosis: left ankle instability,arthritis gastroc equinus   Post-operative diagnosis * No post-op diagnosis entered *   Procedure: Procedure(s):  Left Ankle: arthroscopy - eval and debridement; cartilage repair procedures; lateral ankle ligament repair; gastroc recession   Surgeon: Sandro Castro DPM   Anesthesia: Combined General with Popliteal Block    Estimated blood loss: Less than 10 ml   Blood transfusion: No transfusion was given during surgery   Drains: None   Specimens: None   Findings: Left ankle with large amounts of impingement along with dorsal medial larger area of complete cartilage loss than expected.  Gross lateral rotational instability along with compromise of the peroneal brevis tendon explanatory for the degenerative ankle changes and OA changes.   Complications: None   Condition: Stable   Comments: See dictated operative report for full details.           Sandro Castro DPM, FACFAS  Foot & Ankle Surgeon/Specialist  [facility]

## 2019-12-26 NOTE — ANESTHESIA CARE TRANSFER NOTE
Patient: Wei Kline    Procedure(s):  Left Ankle: arthroscopy - eval and debridement; cartilage repair procedures; medial and lateral ankle ligament repair; gastroc recession    Diagnosis: left ankle instability,arthritis gastroc equinus  Diagnosis Additional Information: No value filed.    Anesthesia Type:   MAC, ETT, General, For Post-op pain in coordination with surgeon     Note:  Airway :Face Mask  Patient transferred to:PACU  Handoff Report: Identifed the Patient, Identified the Reponsible Provider, Reviewed the pertinent medical history, Discussed the surgical course, Reviewed Intra-OP anesthesia mangement and issues during anesthesia, Set expectations for post-procedure period and Allowed opportunity for questions and acknowledgement of understanding      Vitals: (Last set prior to Anesthesia Care Transfer)    CRNA VITALS  12/26/2019 1426 - 12/26/2019 1456      12/26/2019             Pulse:  86    SpO2:  91 %    Resp Rate (observed):  (!) 5                Electronically Signed By: RUSSEL Guadalupe CRNA  December 26, 2019  2:56 PM

## 2019-12-26 NOTE — DISCHARGE INSTRUCTIONS
Same Day Surgery Discharge Instructions  Special Precautions After Surgery - Adult    1. It is not unusual to feel lightheaded or faint, up to 24 hours after surgery or while taking pain medication.  If you have these symptoms; sit for a few minutes before standing and have someone assist you when getting up.  2. You should rest and relax for the next 24 hours and must have someone stay with you for at least 24 hours after your discharge.  3. DO NOT DRIVE any vehicle or operate mechanical equipment for 24 hours following the end of your surgery.  DO NOT DRIVE while taking narcotic pain medications that have been prescribed by your physician.  If you had a limb operated on, you must be able to use it fully to drive.  4. DO NOT drink alcoholic beverages for 24 hours following surgery or while taking prescription pain medication.  5. Drink clear liquids (apple juice, ginger ale, broth, 7-Up, etc.).  Progress to your regular diet as you feel able.  6. Any questions call your physician and do not make important decisions for 24 hours.      ______________________________________________________________________  IMPORTANT NUMBERS:    [facility] Main Number:  [phone], [phone]  Pharmacy:  [phone]  Same Day Surgery:  [phone], Monday - Friday until 8:30 p.m.  Urgent Care:  [phone]  Emergency Room:  [phone]      [facility]:  [phone]                                                                             [facility]:  [phone] option 1  [facility]:  [phone]     [facility]:  [phone]   [facility]:  [phone]   Home Medical Equipment: [phone]  [facility]:  [phone]

## 2019-12-26 NOTE — ANESTHESIA POSTPROCEDURE EVALUATION
Patient: Wei Kline    Procedure(s):  Left Ankle: arthroscopy - eval and debridement; cartilage repair procedures; lateral ankle ligament repair; gastroc recession    Diagnosis:left ankle instability,arthritis gastroc equinus  Diagnosis Additional Information: No value filed.    Anesthesia Type:  MAC, ETT, General, For Post-op pain in coordination with surgeon    Note:  Anesthesia Post Evaluation    Patient location during evaluation: Bedside  Patient participation: Able to fully participate in evaluation  Level of consciousness: awake  Pain management: adequate  Airway patency: patent  Cardiovascular status: acceptable  Respiratory status: acceptable  Hydration status: acceptable     Anesthetic complications: None          Last vitals:  Vitals:    12/26/19 1525 12/26/19 1530 12/26/19 1545   BP:  121/77 124/75   Pulse:  71 73   Resp: 12 12 27   Temp:      SpO2: 96% 97% 97%         Electronically Signed By: RUSSEL Guadalupe CRNA  December 26, 2019  4:17 PM

## 2020-01-09 ENCOUNTER — TRANSFERRED RECORDS (OUTPATIENT)
Dept: HEALTH INFORMATION MANAGEMENT | Facility: CLINIC | Age: 70
End: 2020-01-09

## 2020-02-03 ENCOUNTER — TRANSFERRED RECORDS (OUTPATIENT)
Dept: HEALTH INFORMATION MANAGEMENT | Facility: CLINIC | Age: 70
End: 2020-02-03

## 2020-03-16 ENCOUNTER — MYC MEDICAL ADVICE (OUTPATIENT)
Dept: INTERNAL MEDICINE | Facility: CLINIC | Age: 70
End: 2020-03-16

## 2020-03-18 DIAGNOSIS — E78.5 HYPERLIPIDEMIA WITH TARGET LDL LESS THAN 100: ICD-10-CM

## 2020-03-18 DIAGNOSIS — I10 HYPERTENSION GOAL BP (BLOOD PRESSURE) < 140/90: ICD-10-CM

## 2020-03-18 DIAGNOSIS — E03.9 ACQUIRED HYPOTHYROIDISM: ICD-10-CM

## 2020-03-18 DIAGNOSIS — M10.9 GOUT, UNSPECIFIED CAUSE, UNSPECIFIED CHRONICITY, UNSPECIFIED SITE: ICD-10-CM

## 2020-03-18 NOTE — TELEPHONE ENCOUNTER
Called patient to reschedule physical to 6/3/2020 and patient is requesting we send all medications to their pharmacy to make it through until 6/3  Thank you  LS

## 2020-03-19 RX ORDER — ALLOPURINOL 300 MG/1
300 TABLET ORAL DAILY
Qty: 90 TABLET | Refills: 0 | Status: SHIPPED | OUTPATIENT
Start: 2020-03-19 | End: 2020-06-02

## 2020-03-19 RX ORDER — TELMISARTAN 80 MG/1
80 TABLET ORAL DAILY
Qty: 90 TABLET | Refills: 0 | Status: SHIPPED | OUTPATIENT
Start: 2020-03-19 | End: 2020-06-02

## 2020-03-19 RX ORDER — METOPROLOL TARTRATE 25 MG/1
12.5 TABLET, FILM COATED ORAL 2 TIMES DAILY
Qty: 90 TABLET | Refills: 0 | Status: SHIPPED | OUTPATIENT
Start: 2020-03-19 | End: 2020-06-02

## 2020-03-19 RX ORDER — ATORVASTATIN CALCIUM 20 MG/1
20 TABLET, FILM COATED ORAL DAILY
Qty: 90 TABLET | Refills: 0 | Status: SHIPPED | OUTPATIENT
Start: 2020-03-19 | End: 2020-06-02

## 2020-03-19 RX ORDER — LEVOTHYROXINE SODIUM 137 UG/1
TABLET ORAL
Qty: 90 TABLET | Refills: 0 | Status: SHIPPED | OUTPATIENT
Start: 2020-03-19 | End: 2020-06-02

## 2020-05-31 DIAGNOSIS — I10 HYPERTENSION GOAL BP (BLOOD PRESSURE) < 140/90: ICD-10-CM

## 2020-05-31 DIAGNOSIS — E78.5 HYPERLIPIDEMIA WITH TARGET LDL LESS THAN 100: ICD-10-CM

## 2020-06-02 ENCOUNTER — VIRTUAL VISIT (OUTPATIENT)
Dept: INTERNAL MEDICINE | Facility: CLINIC | Age: 70
End: 2020-06-02
Payer: COMMERCIAL

## 2020-06-02 DIAGNOSIS — M10.9 GOUT, UNSPECIFIED CAUSE, UNSPECIFIED CHRONICITY, UNSPECIFIED SITE: ICD-10-CM

## 2020-06-02 DIAGNOSIS — I10 HYPERTENSION GOAL BP (BLOOD PRESSURE) < 140/90: ICD-10-CM

## 2020-06-02 DIAGNOSIS — E03.9 ACQUIRED HYPOTHYROIDISM: ICD-10-CM

## 2020-06-02 DIAGNOSIS — E78.5 HYPERLIPIDEMIA WITH TARGET LDL LESS THAN 100: Primary | ICD-10-CM

## 2020-06-02 PROCEDURE — 99214 OFFICE O/P EST MOD 30 MIN: CPT | Mod: 95 | Performed by: INTERNAL MEDICINE

## 2020-06-02 RX ORDER — TELMISARTAN 80 MG/1
TABLET ORAL
Qty: 90 TABLET | Refills: 3 | Status: SHIPPED | OUTPATIENT
Start: 2020-06-02 | End: 2021-03-01

## 2020-06-02 RX ORDER — ATORVASTATIN CALCIUM 20 MG/1
TABLET, FILM COATED ORAL
Qty: 90 TABLET | Refills: 3 | Status: SHIPPED | OUTPATIENT
Start: 2020-06-02 | End: 2021-03-01

## 2020-06-02 RX ORDER — METOPROLOL TARTRATE 25 MG/1
12.5 TABLET, FILM COATED ORAL 2 TIMES DAILY
Qty: 90 TABLET | Refills: 4 | Status: SHIPPED | OUTPATIENT
Start: 2020-06-02 | End: 2021-03-01

## 2020-06-02 RX ORDER — LEVOTHYROXINE SODIUM 137 UG/1
TABLET ORAL
Qty: 90 TABLET | Refills: 4 | Status: SHIPPED | OUTPATIENT
Start: 2020-06-02 | End: 2021-03-01

## 2020-06-02 RX ORDER — ALLOPURINOL 300 MG/1
300 TABLET ORAL DAILY
Qty: 90 TABLET | Refills: 4 | Status: SHIPPED | OUTPATIENT
Start: 2020-06-02 | End: 2021-03-01

## 2020-06-02 NOTE — PROGRESS NOTES
"Wei Kline is a 70 year old male who is being evaluated via a billable telephone visit.    The patient has been notified of following:   \"This telephone visit will be conducted via a call between you and your physician/provider. We have found that certain health care needs can be provided without the need for a physical exam. This service lets us provide the care you need with a short phone conversation. If a prescription is necessary we can send it directly to your pharmacy. If lab work is needed we can place an order for that and you can then stop by our lab to have the test done at a later time.  Telephone visits are billed at different rates depending on your insurance coverage. During this emergency period, for some insurers they may be billed the same as an in-person visit. Please reach out to your insurance provider with any questions.  If during the course of the call the physician/provider feels a telephone visit is not appropriate, you will not be charged for this service.\"    Patient has given verbal consent for Telephone visit?  Yes    What phone number would you like to be contacted at? [phone]    How would you like to obtain your AVS? Pily  Per 5/31/2020 encounter patient needed an appointment for refills.   Subjective   Wei Kline is a 70 year old male who presents via phone visit today for the following health issues:  HPI   Hyperlipidemia Follow-Up    Are you regularly taking any medication or supplement to lower your cholesterol?   Yes- Atorvastatin     Are you having muscle aches or other side effects that you think could be caused by your cholesterol lowering medication?  No    Hypertension Follow-up    Do you check your blood pressure regularly outside of the clinic? No     Are you following a low salt diet? No    Are your blood pressures ever more than 140 on the top number (systolic) OR more   than 90 on the bottom number (diastolic), for example 140/90? No    Hypothyroidism " Follow-up    Since last visit, patient describes the following symptoms: Weight stable, no hair loss, no skin changes, no constipation, no loose stools      How many servings of fruits and vegetables do you eat daily?  0-1    On average, how many sweetened beverages do you drink each day (Examples: soda, juice, sweet tea, etc.  Do NOT count diet or artificially sweetened beverages)?   0    How many days per week do you exercise enough to make your heart beat faster? 3 or less    How many minutes a day do you exercise enough to make your heart beat faster? 9 or less    How many days per week do you miss taking your medication? 0    No gout. No rashes.                   Reviewed and updated as needed this visit by Provider  Tobacco  Allergies  Meds  Problems  Med Hx  Surg Hx  Fam Hx         Review of Systems    ROS: 10 point ROS neg other than the symptoms noted above in the HPI.        Objective   Reported vitals:  There were no vitals taken for this visit.   healthy, alert and no distress  PSYCH: Alert and oriented times 3; coherent speech, normal   rate and volume, able to articulate logical thoughts, able   to abstract reason, no tangential thoughts, no hallucinations   or delusions  His affect is normal  RESP: No cough, no audible wheezing, able to talk in full sentences  Remainder of exam unable to be completed due to telephone visits    Diagnostic Test Results:  Labs reviewed in Epic        Assessment/Plan:  1. Gout, unspecified cause, unspecified chronicity, unspecified site  Will do labs in 6 months.    - allopurinol (ZYLOPRIM) 300 MG tablet; Take 1 tablet (300 mg) by mouth daily  Dispense: 90 tablet; Refill: 4    2. Acquired hypothyroidism  Well controlled with medications without side effects.   - levothyroxine (SYNTHROID/LEVOTHROID) 137 MCG tablet; TAKE 1 TABLET (137 MCG) BY MOUTH DAILY  Dispense: 90 tablet; Refill: 4    3. Hypertension goal BP (blood pressure) < 140/90  Well controlled with  medications without side effects.   - metoprolol tartrate (LOPRESSOR) 25 MG tablet; Take 0.5 tablets (12.5 mg) by mouth 2 times daily  Dispense: 90 tablet; Refill: 4    4. Hyperlipidemia with target LDL less than 100  Labs in 6 months.       No follow-ups on file.      Phone call duration:  10 minutes    Geno Mcclelland MD      Start 4:02 PM   Stop 4:12 PM

## 2020-06-02 NOTE — TELEPHONE ENCOUNTER
Pt is due for appointment for further refills. Please call to schedule. Can be video or telephone.

## 2020-06-10 ENCOUNTER — TRANSFERRED RECORDS (OUTPATIENT)
Dept: HEALTH INFORMATION MANAGEMENT | Facility: CLINIC | Age: 70
End: 2020-06-10

## 2020-06-10 LAB — COLOGUARD-ABSTRACT: POSITIVE

## 2020-06-17 ENCOUNTER — TELEPHONE (OUTPATIENT)
Dept: INTERNAL MEDICINE | Facility: CLINIC | Age: 70
End: 2020-06-17

## 2020-06-17 DIAGNOSIS — Z12.11 ENCOUNTER FOR SCREENING COLONOSCOPY: Primary | ICD-10-CM

## 2020-06-17 NOTE — TELEPHONE ENCOUNTER
Called patient and notified him of results and message from provider. Order placed for colonoscopy and pt was given scheduling number.  
Cologuard results have been received.     The results are: Positive    Result Date: 6-    Results have been placed in the covering provider's basket.  Dr. Mcclelland is out of the office until 9-9-2020.  Provider to review and sign off on results. Please send back to team with follow-up needed.      will send copy of results to scanning after covering provider has reviewed and advised. Floridalma Delgado,         
Please call patient-    His cologuard results were positive for blood in his stools.  He needs to undergo a colonoscopy to evaluate further.  Please place order.    Thanks,  Stephani Thomas, CNP    
none

## 2020-06-18 DIAGNOSIS — Z11.59 ENCOUNTER FOR SCREENING FOR OTHER VIRAL DISEASES: Primary | ICD-10-CM

## 2020-06-26 ENCOUNTER — ANESTHESIA EVENT (OUTPATIENT)
Dept: GASTROENTEROLOGY | Facility: CLINIC | Age: 70
End: 2020-06-26
Payer: COMMERCIAL

## 2020-06-26 NOTE — ANESTHESIA PREPROCEDURE EVALUATION
Anesthesia Pre-Procedure Evaluation    Patient: Wei Kline   MRN: [id number] : 1950          Preoperative Diagnosis: Special screening for malignant neoplasms, colon [Z12.11]    Procedure(s):  COLONOSCOPY    Past Medical History:   Diagnosis Date     Gout, unspecified 2015    Diagnosed in  at [facility], no flares since starting allopurinol.     History of cardiomyopathy 2015    Had angiogram in - clean arteries.  Thought to be viral.  Mild decrease in EF initially but last echo in  was normal.     History of Lyme disease 2015     Hyperlipidemia LDL goal < 100 2015     Hypertension goal BP (blood pressure) < 140/90 2015     Hypothyroidism 2015     Past Surgical History:   Procedure Laterality Date     arthroscopic knee surgery      bilateral knees (no replacement)     ARTHROSCOPY ANKLE, OPEN REPAIR LIGAMENT, COMBINED Left 2019    Procedure: Left Ankle: arthroscopy - eval and debridement; cartilage repair procedures; lateral ankle ligament repair; gastroc recession;  Surgeon: Sandro Castro DPM;  Location: [facility] OR     TONSILLECTOMY         Anesthesia Evaluation     . Pt has had prior anesthetic.            ROS/MED HX    ENT/Pulmonary:     (+)sleep apnea, LILLY risk factors hypertension, obese, , . .    Neurologic:     (+)other neuro Lyme disease    Cardiovascular: Comment: Cardiomyopathy    (+) Dyslipidemia, hypertension----. : . . . :. . Previous cardiac testing date:results:date: results:ECG reviewed date:19 results:SR date: results:          METS/Exercise Tolerance:     Hematologic:  - neg hematologic  ROS       Musculoskeletal: Comment: gout  (+) arthritis,  -       GI/Hepatic: Comment: Umbilical hernia        Renal/Genitourinary:     (+) chronic renal disease,       Endo: Comment: hyperglycemia    (+) thyroid problem hypothyroidism, Obesity, .      Psychiatric:  - neg psychiatric ROS       Infectious Disease:  - neg infectious disease ROS      "  Malignancy:      - no malignancy   Other: Comment: SK                         Physical Exam  Normal systems: cardiovascular, pulmonary and dental    Airway   Mallampati: II  TM distance: >3 FB  Neck ROM: full    Dental     Cardiovascular       Pulmonary             Lab Results   Component Value Date    WBC 7.4 03/22/2019    HGB 15.1 03/22/2019    HCT 44.8 03/22/2019     03/22/2019     12/09/2019    POTASSIUM 4.5 12/09/2019    CHLORIDE 108 12/09/2019    CO2 26 12/09/2019    BUN 29 12/09/2019    CR 1.19 12/09/2019    GLC 87 12/09/2019    FLAVIA 9.8 12/09/2019    ALBUMIN 3.6 03/22/2019    PROTTOTAL 7.7 03/22/2019    ALT 34 03/22/2019    AST 31 03/22/2019    ALKPHOS 87 03/22/2019    BILITOTAL 0.5 03/22/2019    TSH 0.40 12/09/2019       Preop Vitals  BP Readings from Last 3 Encounters:   12/26/19 117/66   12/09/19 128/66   08/26/19 132/68    Pulse Readings from Last 3 Encounters:   12/26/19 65   12/09/19 73   08/26/19 97      Resp Readings from Last 3 Encounters:   12/26/19 16   12/09/19 20   05/28/19 24    SpO2 Readings from Last 3 Encounters:   12/26/19 95%   12/09/19 96%   08/26/19 95%      Temp Readings from Last 1 Encounters:   12/26/19 36.8  C (98.2  F) (Oral)    Ht Readings from Last 1 Encounters:   12/26/19 1.778 m (5' 10\")      Wt Readings from Last 1 Encounters:   12/26/19 136.1 kg (300 lb)    Estimated body mass index is 43.05 kg/m  as calculated from the following:    Height as of 12/26/19: 1.778 m (5' 10\").    Weight as of 12/26/19: 136.1 kg (300 lb).       Anesthesia Plan      History & Physical Review  History and physical reviewed and following examination; no interval change.    ASA Status:  3 .    NPO Status:  > 6 hours    Plan for General and MAC with Propofol and Intravenous induction. Maintenance will be TIVA.  Reason for MAC:  Deep or markedly invasive procedure (G8)  PONV prophylaxis:  Ondansetron (or other 5HT-3) and Dexamethasone or Solumedrol         Postoperative " Care      Consents  Anesthetic plan, risks, benefits and alternatives discussed with:  Patient..                 RUSSEL Bonilla CRNA

## 2020-06-27 DIAGNOSIS — Z11.59 ENCOUNTER FOR SCREENING FOR OTHER VIRAL DISEASES: ICD-10-CM

## 2020-06-27 PROCEDURE — 99207 ZZC NO CHARGE NURSE ONLY: CPT

## 2020-06-27 PROCEDURE — U0003 INFECTIOUS AGENT DETECTION BY NUCLEIC ACID (DNA OR RNA); SEVERE ACUTE RESPIRATORY SYNDROME CORONAVIRUS 2 (SARS-COV-2) (CORONAVIRUS DISEASE [COVID-19]), AMPLIFIED PROBE TECHNIQUE, MAKING USE OF HIGH THROUGHPUT TECHNOLOGIES AS DESCRIBED BY CMS-2020-01-R: HCPCS | Performed by: SURGERY

## 2020-06-28 LAB
SARS-COV-2 RNA SPEC QL NAA+PROBE: NOT DETECTED
SPECIMEN SOURCE: NORMAL

## 2020-06-30 ENCOUNTER — HOSPITAL ENCOUNTER (OUTPATIENT)
Facility: CLINIC | Age: 70
Discharge: HOME | End: 2020-06-30
Attending: SURGERY | Admitting: SURGERY
Payer: COMMERCIAL

## 2020-06-30 ENCOUNTER — ANESTHESIA (OUTPATIENT)
Dept: GASTROENTEROLOGY | Facility: CLINIC | Age: 70
End: 2020-06-30
Payer: COMMERCIAL

## 2020-06-30 VITALS
SYSTOLIC BLOOD PRESSURE: 120 MMHG | HEIGHT: 72 IN | DIASTOLIC BLOOD PRESSURE: 78 MMHG | BODY MASS INDEX: 37.93 KG/M2 | OXYGEN SATURATION: 95 % | TEMPERATURE: 98.6 F | WEIGHT: 280 LBS | RESPIRATION RATE: 18 BRPM | HEART RATE: 58 BPM

## 2020-06-30 LAB — COLONOSCOPY: NORMAL

## 2020-06-30 PROCEDURE — 45380 COLONOSCOPY AND BIOPSY: CPT | Performed by: SURGERY

## 2020-06-30 PROCEDURE — 25800030 ZZH RX IP 258 OP 636: Performed by: SURGERY

## 2020-06-30 PROCEDURE — 25000125 ZZHC RX 250: Performed by: SURGERY

## 2020-06-30 PROCEDURE — 25000125 ZZHC RX 250: Performed by: NURSE ANESTHETIST, CERTIFIED REGISTERED

## 2020-06-30 PROCEDURE — 88305 TISSUE EXAM BY PATHOLOGIST: CPT | Performed by: SURGERY

## 2020-06-30 PROCEDURE — 37000008 ZZH ANESTHESIA TECHNICAL FEE, 1ST 30 MIN: Performed by: SURGERY

## 2020-06-30 PROCEDURE — 25000128 H RX IP 250 OP 636: Performed by: NURSE ANESTHETIST, CERTIFIED REGISTERED

## 2020-06-30 PROCEDURE — 45385 COLONOSCOPY W/LESION REMOVAL: CPT | Mod: PT | Performed by: SURGERY

## 2020-06-30 PROCEDURE — 88305 TISSUE EXAM BY PATHOLOGIST: CPT | Mod: 26 | Performed by: SURGERY

## 2020-06-30 RX ORDER — PROPOFOL 10 MG/ML
INJECTION, EMULSION INTRAVENOUS PRN
Status: DISCONTINUED | OUTPATIENT
Start: 2020-06-30 | End: 2020-06-30

## 2020-06-30 RX ORDER — PROPOFOL 10 MG/ML
INJECTION, EMULSION INTRAVENOUS CONTINUOUS PRN
Status: DISCONTINUED | OUTPATIENT
Start: 2020-06-30 | End: 2020-06-30

## 2020-06-30 RX ORDER — SODIUM CHLORIDE, SODIUM LACTATE, POTASSIUM CHLORIDE, CALCIUM CHLORIDE 600; 310; 30; 20 MG/100ML; MG/100ML; MG/100ML; MG/100ML
INJECTION, SOLUTION INTRAVENOUS CONTINUOUS
Status: DISCONTINUED | OUTPATIENT
Start: 2020-06-30 | End: 2020-06-30 | Stop reason: HOSPADM

## 2020-06-30 RX ORDER — ONDANSETRON 2 MG/ML
4 INJECTION INTRAMUSCULAR; INTRAVENOUS
Status: DISCONTINUED | OUTPATIENT
Start: 2020-06-30 | End: 2020-06-30 | Stop reason: HOSPADM

## 2020-06-30 RX ORDER — LIDOCAINE 40 MG/G
CREAM TOPICAL
Status: DISCONTINUED | OUTPATIENT
Start: 2020-06-30 | End: 2020-06-30 | Stop reason: HOSPADM

## 2020-06-30 RX ORDER — GLYCOPYRROLATE 0.2 MG/ML
INJECTION, SOLUTION INTRAMUSCULAR; INTRAVENOUS PRN
Status: DISCONTINUED | OUTPATIENT
Start: 2020-06-30 | End: 2020-06-30

## 2020-06-30 RX ORDER — LIDOCAINE HYDROCHLORIDE 10 MG/ML
INJECTION, SOLUTION INFILTRATION; PERINEURAL PRN
Status: DISCONTINUED | OUTPATIENT
Start: 2020-06-30 | End: 2020-06-30

## 2020-06-30 RX ADMIN — SODIUM CHLORIDE, POTASSIUM CHLORIDE, SODIUM LACTATE AND CALCIUM CHLORIDE 1 ML: 600; 310; 30; 20 INJECTION, SOLUTION INTRAVENOUS at 07:03

## 2020-06-30 RX ADMIN — PROPOFOL 80 MG: 10 INJECTION, EMULSION INTRAVENOUS at 07:32

## 2020-06-30 RX ADMIN — LIDOCAINE HYDROCHLORIDE 1 ML: 10 INJECTION, SOLUTION EPIDURAL; INFILTRATION; INTRACAUDAL; PERINEURAL at 07:04

## 2020-06-30 RX ADMIN — LIDOCAINE HYDROCHLORIDE 50 MG: 10 INJECTION, SOLUTION INFILTRATION; PERINEURAL at 07:32

## 2020-06-30 RX ADMIN — PROPOFOL 150 MCG/KG/MIN: 10 INJECTION, EMULSION INTRAVENOUS at 07:32

## 2020-06-30 RX ADMIN — GLYCOPYRROLATE 0.1 MG: 0.2 INJECTION, SOLUTION INTRAMUSCULAR; INTRAVENOUS at 07:32

## 2020-06-30 ASSESSMENT — MIFFLIN-ST. JEOR: SCORE: 2063.07

## 2020-06-30 NOTE — H&P
70 year old year old male here for colonoscopy for screening.    Patient Active Problem List   Diagnosis     Gout     Hypothyroidism     History of Lyme disease     Hyperlipidemia with target LDL less than 100     Hypertension goal BP (blood pressure) < 140/90     History of cardiomyopathy     LILLY (obstructive sleep apnea)     Kidney stones     Gout, unspecified     Morbid obesity (H)     Hyperglycemia     Seborrheic keratosis     Umbilical hernia without obstruction and without gangrene       Past Medical History:   Diagnosis Date     Gout, unspecified 1/16/2015    Diagnosed in 2009 at [facility], no flares since starting allopurinol.     History of cardiomyopathy 1/16/2015    Had angiogram in 2002- clean arteries.  Thought to be viral.  Mild decrease in EF initially but last echo in 2014 was normal.     History of Lyme disease 1/16/2015     Hyperlipidemia LDL goal < 100 1/16/2015     Hypertension goal BP (blood pressure) < 140/90 1/16/2015     Hypothyroidism 1/16/2015       Past Surgical History:   Procedure Laterality Date     arthroscopic knee surgery  1990's    bilateral knees (no replacement)     ARTHROSCOPY ANKLE, OPEN REPAIR LIGAMENT, COMBINED Left 12/26/2019    Procedure: Left Ankle: arthroscopy - eval and debridement; cartilage repair procedures; lateral ankle ligament repair; gastroc recession;  Surgeon: Sandro Castro DPM;  Location: [facility] OR     TONSILLECTOMY         @[facility]@     current outpatient medications on file.       Allergies   Allergen Reactions     Nka [No Known Allergies]        Pt reports that he quit smoking about 60 years ago. His smoking use included cigarettes. He started smoking about 65 years ago. He has a 2.50 pack-year smoking history. He has never used smokeless tobacco. He reports current alcohol use. He reports that he does not use drugs.    Exam:  BP (!) 138/97 (BP Location: Right arm)   Pulse 79   Temp 98.6  F (37  C) (Oral)   Resp 20   Ht 1.829 m (6')   Wt 127 kg (280 lb)    SpO2 96%   BMI 37.97 kg/m      Awake, Alert OX3  Lungs - CTA bilaterally  CV - RRR, no murmurs, distal pulses intact  Abd - soft, non-distended, non-tender, +BS  Extr - No cyanosis or edema    A/P 70 year old year old male in need of colonoscopy for screening. Risks, benefits, alternatives, and complications were discussed including the possibility of perforation and the patient agreed to proceed    Wes Dowd MD

## 2020-06-30 NOTE — ANESTHESIA POSTPROCEDURE EVALUATION
Patient: Wei Kline    Procedure(s):  COLONOSCOPY, WITH POLYPECTOMY AND BIOPSY    Diagnosis:Special screening for malignant neoplasms, colon [Z12.11]  Diagnosis Additional Information: No value filed.    Anesthesia Type:  General, MAC    Note:  Anesthesia Post Evaluation    Patient location during evaluation: Phase 2 and Bedside  Patient participation: Able to fully participate in evaluation  Level of consciousness: awake and alert  Pain management: adequate  Airway patency: patent  Cardiovascular status: acceptable and hemodynamically stable  Respiratory status: acceptable  Hydration status: acceptable  PONV: none     Anesthetic complications: None          Last vitals:  Vitals:    06/30/20 0631 06/30/20 0758   BP: (!) 138/97 107/62   Pulse: 79 78   Resp: 20    Temp: 37  C (98.6  F)    SpO2: 96% (!) 89%         Electronically Signed By: RUSSEL Alejandro CRNA  June 30, 2020  8:04 AM

## 2020-06-30 NOTE — BRIEF OP NOTE
[facility]   Brief Operative Note    Pre-operative diagnosis: Special screening for malignant neoplasms, colon [Z12.11]   Post-operative diagnosis multiple polyps, hemorrhoids     Procedure: Procedure(s):  COLONOSCOPY, WITH POLYPECTOMY AND BIOPSY   Surgeon(s): Surgeon(s) and Role:     * Wes Dowd MD - Primary   Estimated blood loss: * No values recorded between 6/30/2020  7:33 AM and 6/30/2020  7:56 AM *    Specimens: ID Type Source Tests Collected by Time Destination   A : Colon Biopsies/Polyps Biopsy Colon SURGICAL PATHOLOGY EXAM Wes Dowd MD 6/30/2020  7:49 AM       Findings: 1. Several polyps as outlined, most retrieved  2. Hemorrhoids  3. Colon otherwise normal

## 2020-06-30 NOTE — ANESTHESIA CARE TRANSFER NOTE
Patient: Wei Kline    Procedure(s):  COLONOSCOPY, WITH POLYPECTOMY AND BIOPSY    Diagnosis: Special screening for malignant neoplasms, colon [Z12.11]  Diagnosis Additional Information: No value filed.    Anesthesia Type:   General, MAC     Note:  Airway :Nasal Cannula  Patient transferred to:Phase II  Handoff Report: Identifed the Patient, Identified the Reponsible Provider, Reviewed the pertinent medical history, Discussed the surgical course, Reviewed Intra-OP anesthesia mangement and issues during anesthesia, Set expectations for post-procedure period and Allowed opportunity for questions and acknowledgement of understanding      Vitals: (Last set prior to Anesthesia Care Transfer)    CRNA VITALS  6/30/2020 0724 - 6/30/2020 0755      6/30/2020             Pulse:  75    SpO2:  91 %                Electronically Signed By: RUSSEL Alejandro CRNA  June 30, 2020  7:55 AM

## 2020-07-02 LAB — COPATH REPORT: NORMAL

## 2020-07-03 PROBLEM — D12.6 TUBULAR ADENOMA OF COLON: Status: ACTIVE | Noted: 2020-07-03

## 2020-09-18 NOTE — PATIENT INSTRUCTIONS
[facility]    If you have any questions regarding to your visit please contact your care team:     Team Pink:   Clinic Hours Telephone Number   Internal Medicine:  Dr. Geno Thomas NP       7am-7pm  Monday - Thursday   7am-5pm  Fridays  [phone]  (Appointment scheduling available 24/7)    Questions about your visit?  Team Line  [phone]   Urgent Care - [place] and [facility] - 11am-9pm Monday-Friday Saturday-Sunday- 9am-5pm   [place] - 5pm-9pm Monday-Friday Saturday-Sunday- 9am-5pm  [phone] - [place]   [phone] - [place]       What options do I have for visits at the clinic other than the traditional office visit?  To expand how we care for you, many of our providers are utilizing electronic visits (e-visits) and telephone visits, when medically appropriate, for interactions with their patients rather than a visit in the clinic.   We also offer nurse visits for many medical concerns. Just like any other service, we will bill your insurance company for this type of visit based on time spent on the phone with your provider. Not all insurance companies cover these visits. Please check with your medical insurance if this type of visit is covered. You will be responsible for any charges that are not paid by your insurance.      E-visits via [employer]:  generally incur a $35.00 fee.  Telephone visits:  Time spent on the phone: *charged based on time that is spent on the phone in increments of 10 minutes. Estimated cost:   5-10 mins $30.00   11-20 mins. $59.00   21-30 mins. $85.00   Use "[employer]t (secure email communication and access to your chart) to send your primary care provider a message or make an appointment. Ask someone on your Team how to sign up for [employer].    For a Price Quote for your services, please call our Consumer Price Line at [phone].    As always, Thank you for trusting us with your health care needs  Axel  [FreeTextEntry1] : \cate Del Cid had PGAD and is doing very well with PT, she will cont with PT, lidocaine PRN and return as needed. All questions were answered.  GUILLERMINA Stewart    Complete your Advance Directive at your leisure.    If you ever want to you can send me pictures of what you are eating or food log for a few days on MyFitness Pal or Lose It.   Make sure half your plate is non starchy vegetables at lunch/dinner.     Eating to Prevent Gout  Gout is a painful form of arthritis caused by an excess of uric acid. This is a waste product made by the body. It builds up in the body and forms crystals that collect in the joints, bringing on a gout attack. Alcohol and certain foods can trigger a gout attack. Below are some guidelines for changing your diet to help you manage gout. Your healthcare provider can work with you to determine the best eating plan for you. Know that diet is only one part of managing gout. Take your medicines as prescribed and follow the other guidelines your healthcare provider has given you.  Foods to limit  Eating too many foods containing purines may increase the levels of uric acid in your body and increase your risk for a gout attack. It may be best to limit these high-purine foods:    Alcohol (beer, red wine). You may be told to avoid alcohol completely.    Certain fish (anchovies, sardines, fish roes, herring, tuna, mussels, codfish, scallops, trout, and antoinette)    Certain meats (red meat, processed meat, hawley, turkey, wild game, and goose)    Sauces and gravies made with meat    Organ meats (such as liver, kidneys, sweetbreads, and tripe)    Legumes (such as dried beans, peas)    Mushrooms, spinach, asparagus, and cauliflower    Yeast and yeast extract supplements  Foods to try  Some foods may be helpful for people with gout. You may want to try adding some of the following foods to your diet:    Dark berries: These include blueberries, blackberries, and cherries. These berries contain chemicals that may lower uric acid.    Tofu: Tofu, which is made from soy, is a good source of protein. Studies have shown that it may be a better choice than  meat for people with gout.    Omega fatty acids: These acids are found in fatty fish (such as salmon), certain oils (such as flax, olive, or nut oils), or nuts. They may help prevent inflammation due to gout.  The following guidelines are recommended by the American Medical Association for people with gout. Your diet should be:    High in fiber, whole grains, fruits, and vegetables.    Low in protein (15% of calories should come from protein. Choose lean sources such as soy, lean meats, and poultry).    Low in fat (no more than 30% of calories should come from fat, with only 10% coming from animal fat).   Date Last Reviewed: 6/17/2015 2000-2017 [employer]. [address] [place] [address], [place] [address]. All rights reserved. This information is not intended as a substitute for professional medical care. Always follow your healthcare professional's instructions.

## 2020-10-02 ENCOUNTER — ALLIED HEALTH/NURSE VISIT (OUTPATIENT)
Dept: FAMILY MEDICINE | Facility: CLINIC | Age: 70
End: 2020-10-02
Payer: COMMERCIAL

## 2020-10-02 DIAGNOSIS — Z23 NEED FOR VACCINATION: Primary | ICD-10-CM

## 2020-10-02 PROCEDURE — G0008 ADMIN INFLUENZA VIRUS VAC: HCPCS

## 2020-10-02 PROCEDURE — 99207 PR NO CHARGE NURSE ONLY: CPT

## 2020-10-02 PROCEDURE — 90662 IIV NO PRSV INCREASED AG IM: CPT

## 2020-11-10 DIAGNOSIS — G47.33 OSA (OBSTRUCTIVE SLEEP APNEA): Primary | ICD-10-CM

## 2020-11-14 ENCOUNTER — HEALTH MAINTENANCE LETTER (OUTPATIENT)
Age: 70
End: 2020-11-14

## 2020-11-20 ENCOUNTER — DOCUMENTATION ONLY (OUTPATIENT)
Dept: SLEEP MEDICINE | Facility: CLINIC | Age: 70
End: 2020-11-20

## 2020-11-20 NOTE — PROGRESS NOTES
PT CALLED [facility] AND STATED HE RECD THE WRONG SIZE CUSHION THAT WAS SENT TO HIM VIA [facility].  DUE TO PATIENT OPENING THE MASK TO TRY IT ON, [facility] WILL NOT TAKE MASK BACK.  ZERO BILLED MEDIUM CUSHION ( DUE TO ME ERROR) AND SENT IT OUT VIA [facility].

## 2021-02-04 ENCOUNTER — MYC MEDICAL ADVICE (OUTPATIENT)
Dept: INTERNAL MEDICINE | Facility: CLINIC | Age: 71
End: 2021-02-04

## 2021-02-23 ASSESSMENT — ENCOUNTER SYMPTOMS
DYSURIA: 0
NERVOUS/ANXIOUS: 0
JOINT SWELLING: 1
CONSTIPATION: 0
DIARRHEA: 0
MYALGIAS: 0
NAUSEA: 0
CHILLS: 0
EYE PAIN: 0
HEMATURIA: 0
SORE THROAT: 0
ABDOMINAL PAIN: 0
FREQUENCY: 1
HEADACHES: 0
DIZZINESS: 0
SHORTNESS OF BREATH: 0
PALPITATIONS: 0
FEVER: 0
ARTHRALGIAS: 1
HEARTBURN: 0
PARESTHESIAS: 0
HEMATOCHEZIA: 0
COUGH: 0
WEAKNESS: 0

## 2021-02-23 ASSESSMENT — ACTIVITIES OF DAILY LIVING (ADL): CURRENT_FUNCTION: NO ASSISTANCE NEEDED

## 2021-03-01 ENCOUNTER — OFFICE VISIT (OUTPATIENT)
Dept: INTERNAL MEDICINE | Facility: CLINIC | Age: 71
End: 2021-03-01
Payer: COMMERCIAL

## 2021-03-01 VITALS
SYSTOLIC BLOOD PRESSURE: 126 MMHG | TEMPERATURE: 97.5 F | HEART RATE: 62 BPM | BODY MASS INDEX: 39.55 KG/M2 | WEIGHT: 292 LBS | OXYGEN SATURATION: 97 % | DIASTOLIC BLOOD PRESSURE: 78 MMHG | HEIGHT: 72 IN

## 2021-03-01 DIAGNOSIS — E83.52 HYPERCALCEMIA: ICD-10-CM

## 2021-03-01 DIAGNOSIS — E78.5 HYPERLIPIDEMIA WITH TARGET LDL LESS THAN 100: ICD-10-CM

## 2021-03-01 DIAGNOSIS — Z12.5 ENCOUNTER FOR SCREENING FOR MALIGNANT NEOPLASM OF PROSTATE: ICD-10-CM

## 2021-03-01 DIAGNOSIS — M10.9 GOUT, UNSPECIFIED CAUSE, UNSPECIFIED CHRONICITY, UNSPECIFIED SITE: ICD-10-CM

## 2021-03-01 DIAGNOSIS — E03.9 ACQUIRED HYPOTHYROIDISM: ICD-10-CM

## 2021-03-01 DIAGNOSIS — S76.212A INGUINAL STRAIN, LEFT, INITIAL ENCOUNTER: ICD-10-CM

## 2021-03-01 DIAGNOSIS — R35.0 URINARY FREQUENCY: ICD-10-CM

## 2021-03-01 DIAGNOSIS — I10 HYPERTENSION GOAL BP (BLOOD PRESSURE) < 140/90: ICD-10-CM

## 2021-03-01 DIAGNOSIS — Z00.00 MEDICARE ANNUAL WELLNESS VISIT, SUBSEQUENT: Primary | ICD-10-CM

## 2021-03-01 LAB
ALT SERPL W P-5'-P-CCNC: 35 U/L (ref 0–70)
ANION GAP SERPL CALCULATED.3IONS-SCNC: 3 MMOL/L (ref 3–14)
BUN SERPL-MCNC: 22 MG/DL (ref 7–30)
CALCIUM SERPL-MCNC: 10.3 MG/DL (ref 8.5–10.1)
CHLORIDE SERPL-SCNC: 107 MMOL/L (ref 94–109)
CHOLEST SERPL-MCNC: 161 MG/DL
CO2 SERPL-SCNC: 28 MMOL/L (ref 20–32)
CREAT SERPL-MCNC: 1.24 MG/DL (ref 0.66–1.25)
ERYTHROCYTE [DISTWIDTH] IN BLOOD BY AUTOMATED COUNT: 13.8 % (ref 10–15)
GFR SERPL CREATININE-BSD FRML MDRD: 58 ML/MIN/{1.73_M2}
GLUCOSE SERPL-MCNC: 92 MG/DL (ref 70–99)
HCT VFR BLD AUTO: 47.2 % (ref 40–53)
HDLC SERPL-MCNC: 60 MG/DL
HGB BLD-MCNC: 15.3 G/DL (ref 13.3–17.7)
LDLC SERPL CALC-MCNC: 83 MG/DL
MCH RBC QN AUTO: 30.5 PG (ref 26.5–33)
MCHC RBC AUTO-ENTMCNC: 32.4 G/DL (ref 31.5–36.5)
MCV RBC AUTO: 94 FL (ref 78–100)
NONHDLC SERPL-MCNC: 101 MG/DL
PLATELET # BLD AUTO: 163 10E9/L (ref 150–450)
POTASSIUM SERPL-SCNC: 4.3 MMOL/L (ref 3.4–5.3)
PSA SERPL-ACNC: 1.3 UG/L (ref 0–4)
RBC # BLD AUTO: 5.01 10E12/L (ref 4.4–5.9)
SODIUM SERPL-SCNC: 138 MMOL/L (ref 133–144)
TRIGL SERPL-MCNC: 90 MG/DL
TSH SERPL DL<=0.005 MIU/L-ACNC: 1.73 MU/L (ref 0.4–4)
URATE SERPL-MCNC: 6.1 MG/DL (ref 3.5–7.2)
WBC # BLD AUTO: 7.8 10E9/L (ref 4–11)

## 2021-03-01 PROCEDURE — G0103 PSA SCREENING: HCPCS | Performed by: INTERNAL MEDICINE

## 2021-03-01 PROCEDURE — 99397 PER PM REEVAL EST PAT 65+ YR: CPT | Performed by: INTERNAL MEDICINE

## 2021-03-01 PROCEDURE — 84460 ALANINE AMINO (ALT) (SGPT): CPT | Performed by: INTERNAL MEDICINE

## 2021-03-01 PROCEDURE — 84443 ASSAY THYROID STIM HORMONE: CPT | Performed by: INTERNAL MEDICINE

## 2021-03-01 PROCEDURE — 80061 LIPID PANEL: CPT | Performed by: INTERNAL MEDICINE

## 2021-03-01 PROCEDURE — 80048 BASIC METABOLIC PNL TOTAL CA: CPT | Performed by: INTERNAL MEDICINE

## 2021-03-01 PROCEDURE — 84550 ASSAY OF BLOOD/URIC ACID: CPT | Performed by: INTERNAL MEDICINE

## 2021-03-01 PROCEDURE — 85027 COMPLETE CBC AUTOMATED: CPT | Performed by: INTERNAL MEDICINE

## 2021-03-01 PROCEDURE — 36415 COLL VENOUS BLD VENIPUNCTURE: CPT | Performed by: INTERNAL MEDICINE

## 2021-03-01 RX ORDER — TELMISARTAN 80 MG/1
80 TABLET ORAL DAILY
Qty: 90 TABLET | Refills: 3 | Status: SHIPPED | OUTPATIENT
Start: 2021-03-01 | End: 2022-03-15

## 2021-03-01 RX ORDER — ATORVASTATIN CALCIUM 20 MG/1
20 TABLET, FILM COATED ORAL DAILY
Qty: 90 TABLET | Refills: 3 | Status: SHIPPED | OUTPATIENT
Start: 2021-03-01 | End: 2022-03-15

## 2021-03-01 RX ORDER — METOPROLOL TARTRATE 25 MG/1
12.5 TABLET, FILM COATED ORAL 2 TIMES DAILY
Qty: 90 TABLET | Refills: 4 | Status: SHIPPED | OUTPATIENT
Start: 2021-03-01 | End: 2022-03-15

## 2021-03-01 RX ORDER — ALLOPURINOL 300 MG/1
300 TABLET ORAL DAILY
Qty: 90 TABLET | Refills: 4 | Status: SHIPPED | OUTPATIENT
Start: 2021-03-01 | End: 2022-03-15

## 2021-03-01 RX ORDER — TAMSULOSIN HYDROCHLORIDE 0.4 MG/1
0.4 CAPSULE ORAL DAILY
Qty: 90 CAPSULE | Refills: 3 | Status: SHIPPED | OUTPATIENT
Start: 2021-03-01 | End: 2021-03-30

## 2021-03-01 RX ORDER — LEVOTHYROXINE SODIUM 137 UG/1
TABLET ORAL
Qty: 90 TABLET | Refills: 4 | Status: SHIPPED | OUTPATIENT
Start: 2021-03-01 | End: 2022-03-15

## 2021-03-01 ASSESSMENT — ACTIVITIES OF DAILY LIVING (ADL): CURRENT_FUNCTION: NO ASSISTANCE NEEDED

## 2021-03-01 ASSESSMENT — ENCOUNTER SYMPTOMS
MYALGIAS: 0
JOINT SWELLING: 1
NERVOUS/ANXIOUS: 0
ABDOMINAL PAIN: 0
PARESTHESIAS: 0
WEAKNESS: 0
SHORTNESS OF BREATH: 0
FEVER: 0
HEADACHES: 0
EYE PAIN: 0
FREQUENCY: 1
COUGH: 0
ARTHRALGIAS: 1
HEMATOCHEZIA: 0
SORE THROAT: 0
NAUSEA: 0
DYSURIA: 0
PALPITATIONS: 0
HEMATURIA: 0
CHILLS: 0
HEARTBURN: 0
DIZZINESS: 0
DIARRHEA: 0
CONSTIPATION: 0

## 2021-03-01 ASSESSMENT — MIFFLIN-ST. JEOR: SCORE: 2112.5

## 2021-03-01 NOTE — RESULT ENCOUNTER NOTE
Normal thyroid. Good cholesterol. Normal uric acid level.  Normal electrolytes. Stable kidney function. Normal liver blood test. Normal prostate blood test. Normal blood count. Calcium level is a touch high.  Please stop any supplements with calcium in them.  Repeat in the lab in 1 month.  Dr. Mcclelland

## 2021-03-01 NOTE — PROGRESS NOTES
"SUBJECTIVE:   Wei Kline is a 71 year old male who presents for Preventive Visit.      Patient has been advised of split billing requirements and indicates understanding: Yes   Are you in the first 12 months of your Medicare coverage?  No    Healthy Habits:     In general, how would you rate your overall health?  Good    Frequency of exercise:  6-7 days/week    Duration of exercise:  45-60 minutes    Do you usually eat at least 4 servings of fruit and vegetables a day, include whole grains    & fiber and avoid regularly eating high fat or \"junk\" foods?  Yes    Taking medications regularly:  Yes    Medication side effects:  None    Ability to successfully perform activities of daily living:  No assistance needed    Home Safety:  No safety concerns identified    Hearing Impairment:  No hearing concerns    In the past 6 months, have you been bothered by leaking of urine?  No    In general, how would you rate your overall mental or emotional health?  Excellent      PHQ-2 Total Score: 0    Additional concerns today:  No    Do you feel safe in your environment? Yes    Have you ever done Advance Care Planning? (For example, a Health Directive, POLST, or a discussion with a medical provider or your loved ones about your wishes): No, advance care planning information given to patient to review.  Patient plans to discuss their wishes with loved ones or provider.         Fall risk     click delete button to remove this line now  Cognitive Screening   1) Repeat 3 items (Leader, Season, Table)    2) Clock draw: NORMAL  3) 3 item recall: Recalls 3 objects  Results: 3 items recalled: COGNITIVE IMPAIRMENT LESS LIKELY    Mini-CogTM Copyright GORGE Lawson. Licensed by the author for use in [facility]; reprinted with permission (robb@.[facility]). All rights reserved.      Do you have sleep apnea, excessive snoring or daytime drowsiness?: yes    Reviewed and updated as needed this visit by clinical staff    Meds          "     Reviewed and updated as needed this visit by Provider                Social History     Tobacco Use     Smoking status: Former Smoker     Packs/day: 0.50     Years: 5.00     Pack years: 2.50     Types: Cigarettes     Start date: 1955     Quit date: 1960     Years since quittin.1     Smokeless tobacco: Never Used   Substance Use Topics     Alcohol use: Yes     Comment: occasional         Alcohol Use 2021   Prescreen: >3 drinks/day or >7 drinks/week? No   Prescreen: >3 drinks/day or >7 drinks/week? -           -------------------------------------    Current providers sharing in care for this patient include:   Patient Care Team:  Geno Mcclelland MD as PCP - General (Internal Medicine)  Geno Mcclelland MD as Assigned PCP    The following health maintenance items are reviewed in Epic and correct as of today:  Health Maintenance   Topic Date Due     ZOSTER IMMUNIZATION (2 of 3) 2014     MEDICARE ANNUAL WELLNESS VISIT  2020     FALL RISK ASSESSMENT  2021     ADVANCE CARE PLANNING  2023     COLORECTAL CANCER SCREENING  2023     LIPID  2024     DTAP/TDAP/TD IMMUNIZATION (3 - Td) 2029     PHQ-2  Completed     INFLUENZA VACCINE  Completed     Pneumococcal Vaccine: 65+ Years  Completed     AORTIC ANEURYSM SCREENING (SYSTEM ASSIGNED)  Completed     HEPATITIS C SCREENING  Addressed     Pneumococcal Vaccine: Pediatrics (0 to 5 Years) and At-Risk Patients (6 to 64 Years)  Aged Out     IPV IMMUNIZATION  Aged Out     MENINGITIS IMMUNIZATION  Aged Out     HEPATITIS B IMMUNIZATION  Aged Out     Labs reviewed in EPIC      Review of Systems   Constitutional: Negative for chills and fever.   HENT: Negative for congestion, ear pain, hearing loss and sore throat.    Eyes: Negative for pain and visual disturbance.   Respiratory: Negative for cough and shortness of breath.    Cardiovascular: Negative for chest pain, palpitations and peripheral edema.   Gastrointestinal:  Negative for abdominal pain, constipation, diarrhea, heartburn, hematochezia and nausea.   Genitourinary: Positive for frequency (he does have some urge, up 2 times at night). Negative for discharge, dysuria, genital sores, hematuria, impotence and urgency.   Musculoskeletal: Positive for arthralgias and joint swelling. Negative for myalgias.   Skin: Negative for rash.   Neurological: Negative for dizziness, weakness, headaches and paresthesias.   Psychiatric/Behavioral: Negative for mood changes. The patient is not nervous/anxious.    hurt his left groin and wonders if he has a hernia.      OBJECTIVE:   There were no vitals taken for this visit. Estimated body mass index is 37.97 kg/m  as calculated from the following:    Height as of 6/30/20: 1.829 m (6').    Weight as of 6/30/20: 127 kg (280 lb).  Physical Exam  GENERAL APPEARANCE: healthy, alert and no distress  EYES: Eyes grossly normal to inspection, PERRL and conjunctivae and sclerae normal  HENT: ear canals and TM's normal   NECK: no adenopathy, no asymmetry, masses, or scars, thyroid normal to palpation and no bruits  RESP: lungs clear to auscultation - no rales, rhonchi or wheezes  CV: regular rates and rhythm and normal S1 S2, no S3 or S4  LYMPHATICS: normal ant/post cervical and supraclavicular nodes  ABDOMEN: soft, nontender, without hepatosplenomegaly or masses and bowel sounds normal   (male): prostate moderately enlarged without nodules, nontender  MS: extremities normal- no gross deformities noted  SKIN: no suspicious lesions or rashes  NEURO: Normal strength and tone, mentation intact and speech normal  PSYCH: mentation appears normal and affect normal/bright  Trace lower extremity edema, worse on the left ankle  Rectal- no masses      Diagnostic Test Results:  Labs reviewed in Epic    ASSESSMENT / PLAN:   1. Medicare annual wellness visit, subsequent      2. Gout, unspecified cause, unspecified chronicity, unspecified site  Asymptomatic   -  allopurinol (ZYLOPRIM) 300 MG tablet; Take 1 tablet (300 mg) by mouth daily  Dispense: 90 tablet; Refill: 4  - Uric acid  - ALT  - CBC with platelets    3. Hyperlipidemia with target LDL less than 100  Well controlled with medications without side effects.   - atorvastatin (LIPITOR) 20 MG tablet; Take 1 tablet (20 mg) by mouth daily  Dispense: 90 tablet; Refill: 3  - Lipid panel reflex to direct LDL Fasting    4. Acquired hypothyroidism  Well controlled with medications without side effects.   - levothyroxine (SYNTHROID/LEVOTHROID) 137 MCG tablet; TAKE 1 TABLET (137 MCG) BY MOUTH DAILY  Dispense: 90 tablet; Refill: 4  - TSH with free T4 reflex    5. Hypertension goal BP (blood pressure) < 140/90  Well controlled with medications without side effects.   - metoprolol tartrate (LOPRESSOR) 25 MG tablet; Take 0.5 tablets (12.5 mg) by mouth 2 times daily  Dispense: 90 tablet; Refill: 4  - telmisartan (MICARDIS) 80 MG tablet; Take 1 tablet (80 mg) by mouth daily  Dispense: 90 tablet; Refill: 3  - Basic metabolic panel    6. Urinary frequency  Will see if flomax helps   - PSA, screen  - tamsulosin (FLOMAX) 0.4 MG capsule; Take 1 capsule (0.4 mg) by mouth daily  Dispense: 90 capsule; Refill: 3    7. Encounter for screening for malignant neoplasm of prostate     - PSA, screen    8. Inguinal strain, left, initial encounter  No hernia noted.  Treat as muscle strain.        Patient has been advised of split billing requirements and indicates understanding: Yes  COUNSELING:  Reviewed preventive health counseling, as reflected in patient instructions    Estimated body mass index is 37.97 kg/m  as calculated from the following:    Height as of 6/30/20: 1.829 m (6').    Weight as of 6/30/20: 127 kg (280 lb).    Weight management plan: not discussed    He reports that he quit smoking about 61 years ago. His smoking use included cigarettes. He started smoking about 66 years ago. He has a 2.50 pack-year smoking history. He has never  used smokeless tobacco.      Appropriate preventive services were discussed with this patient, including applicable screening as appropriate for cardiovascular disease, diabetes, osteopenia/osteoporosis, and glaucoma.  As appropriate for age/gender, discussed screening for colorectal cancer, prostate cancer, breast cancer, and cervical cancer. Checklist reviewing preventive services available has been given to the patient.    Reviewed patients plan of care and provided an AVS. The Basic Care Plan (routine screening as documented in Health Maintenance) for Wei meets the Care Plan requirement. This Care Plan has been established and reviewed with the Patient.    Counseling Resources:  ATP IV Guidelines  Pooled Cohorts Equation Calculator  Breast Cancer Risk Calculator  Breast Cancer: Medication to Reduce Risk  FRAX Risk Assessment  ICSI Preventive Guidelines  Dietary Guidelines for Americans, 2010  USDA's MyPlate  ASA Prophylaxis  Lung CA Screening    Geno Mcclelland MD  [facility]    Identified Health Risks:    There are no Patient Instructions on file for this visit.

## 2021-03-30 ENCOUNTER — OFFICE VISIT (OUTPATIENT)
Dept: FAMILY MEDICINE | Facility: CLINIC | Age: 71
End: 2021-03-30
Payer: COMMERCIAL

## 2021-03-30 VITALS
DIASTOLIC BLOOD PRESSURE: 80 MMHG | RESPIRATION RATE: 20 BRPM | WEIGHT: 204.2 LBS | OXYGEN SATURATION: 94 % | BODY MASS INDEX: 29.23 KG/M2 | SYSTOLIC BLOOD PRESSURE: 134 MMHG | TEMPERATURE: 97.4 F | HEART RATE: 73 BPM | HEIGHT: 70 IN

## 2021-03-30 DIAGNOSIS — E78.5 HYPERLIPIDEMIA WITH TARGET LDL LESS THAN 100: ICD-10-CM

## 2021-03-30 DIAGNOSIS — E66.01 MORBID OBESITY (H): Primary | ICD-10-CM

## 2021-03-30 DIAGNOSIS — K42.9 UMBILICAL HERNIA WITHOUT OBSTRUCTION AND WITHOUT GANGRENE: ICD-10-CM

## 2021-03-30 DIAGNOSIS — E03.9 ACQUIRED HYPOTHYROIDISM: ICD-10-CM

## 2021-03-30 DIAGNOSIS — M1A.09X0 IDIOPATHIC CHRONIC GOUT OF MULTIPLE SITES WITHOUT TOPHUS: ICD-10-CM

## 2021-03-30 PROBLEM — M10.9 GOUT, UNSPECIFIED: Status: RESOLVED | Noted: 2017-03-03 | Resolved: 2021-03-30

## 2021-03-30 PROCEDURE — 99213 OFFICE O/P EST LOW 20 MIN: CPT | Performed by: INTERNAL MEDICINE

## 2021-03-30 ASSESSMENT — MIFFLIN-ST. JEOR: SCORE: 1682.5

## 2021-03-30 NOTE — PROGRESS NOTES
Assessment & Plan     Morbid obesity (H) -discussed diet and exercise.  Recommended weight watchers or other user-friendly commercial diet program.  Discussed options of nutritionist, medication or medical weight loss clinic.  He wants to work on things himself first.  - **CBC with platelets FUTURE 1yr; Future  - Basic metabolic panel; Future    Umbilical hernia without obstruction and without gangrene -discussed that surgery is not indicated until he is able to successfully lose some weight and keep it off.    Idiopathic chronic gout of multiple sites without tophus -due for labs in 1 year  - **CBC with platelets FUTURE 1yr; Future  - Uric acid; Future  - Basic metabolic panel; Future  - **ALT FUTURE anytime; Future    Hyperlipidemia with target LDL less than 100 -due for labs in 1 year  - Basic metabolic panel; Future  - Lipid panel reflex to direct LDL Fasting; Future    Acquired hypothyroidism  - Basic metabolic panel; Future  - **TSH with free T4 reflex FUTURE anytime; Future      Patient Instructions     1. Hernia surgery - would not be done until you are able to lose some weight and keep it off for a period of time.  2. Due for repeat calcium check.  Let dr. Gillis know - won't see results      Dr. Gillis's Tips for a Healthy Diet    1. Add more fresh fruits and vegetables to your diet.  The more colorful with the fruit or vegetable (think berries, spinach, carrots, peppers) the healthier it tends to be.  Juice is not a fruit.  Prepare the vegetables in a healthy way - steam, bake.  Avoid batter/breading, butter/oil to cook.  2. Add more fiber to your diet.  Swap out white bread, white rice, white pasta for whole grain versions.  Reduce the portion size and frequency of carbohydrates/starches.  3. Choose healthier fats such as nuts, olive oil, avocado, etc. Stay away from lard, butter.  4. Decrease the frequency and portion size of 'junk food' -pizza, candy, cookies, potato chips, etc.  5. Watch liquid  calories such as coffee drinks, juice, soda, teas.  There tends to be excessive sugar in these beverages.  6. Increase protein in your diet.  Eggs, cheese, yogurt, nuts, lentils, chicken, fish are good healthy choices.  Protein keeps you mohr longer, and you are less likely to have blood sugar spikes  7. Eat healthy at least 80% of the time.  It is ok for a special treat (Mom's spaghetti dinner, cake on your birthday) every once in a while, just not every day.  When are you going to indulge (think [place] Fair time), be sure you are eating extra healthy the day before and after.      Resources:    1. [employer] Resources for Health and Wellness:https://www.takingcharge.[facility].edu/dig-yes-foods    2.   [employer] Ways To Wellness:    https://www.[employer].org/services/ways-to-wellness  Ways to Wellness offers:    Nutrition and weight management     Corrective exercise and fitness training     Lifestyle and behavioral change     Healing services  Our team includes registered dietitians, certified personal trainers, life coaches, as well as a Culinary Educator.    3. [facility] for Cardiovascular Disease Prevention  Consider making an appointment at the [facility] if either of the following describes you:    You are an adult who has risk factors for cardiovascular disease. Risk factors include cholesterol elevations, diabetes, high blood pressure, elevated weight, inactive living, and history of tobacco use.     You don t have traditional risk factors but have a strong family history of cardiovascular disease, which may mean you have a higher of risk of cardiovascular disease.     4. Atomic Habits by Benjamin Panchal.  This a good book that looks into our habits and how to sustain good healthy habits and get rid of bad habits.        No follow-ups on file.    Mini Gillis DO  [facility]    Frederick Carvajal is a 71 year old who presents for the following health issues  accompanied by  "his spouse:    HPI     New Patient/Transfer of Care, was being seen at [facility]      Umbilical hernia: present for several years.  Wants it addressed/fixed    Weight: wonders if there is a good pill for this.   --has tried to cut back on junk food.  --has not tried any formal diet program.  Not tried meds, seen bariatric clinic.  --He has asked numerous doctors in the past to increase his thyroid medication to help with weight loss.        Review of Systems   CONSTITUTIONAL: NEGATIVE for fever, chills, change in weight  ENT/MOUTH: NEGATIVE for ear, mouth and throat problems  RESP: NEGATIVE for significant cough or SOB  CV: NEGATIVE for chest pain, palpitations or peripheral edema      Objective    /80 (BP Location: Right arm, Patient Position: Chair, Cuff Size: Adult Large)   Pulse 73   Temp 97.4  F (36.3  C) (Tympanic)   Resp 20   Ht 1.778 m (5' 10\")   Wt 92.6 kg (204 lb 3.2 oz)   SpO2 94%   BMI 29.30 kg/m    Body mass index is 29.3 kg/m .  Physical Exam   GENERAL APPEARANCE: healthy, alert, no distress and Nourishment morbidly obese                 "

## 2021-03-30 NOTE — PATIENT INSTRUCTIONS
1. Hernia surgery - would not be done until you are able to lose some weight and keep it off for a period of time.  2. Due for repeat calcium check.  Let dr. Gillis know - won't see results      Dr. Gillis's Tips for a Healthy Diet    1. Add more fresh fruits and vegetables to your diet.  The more colorful with the fruit or vegetable (think berries, spinach, carrots, peppers) the healthier it tends to be.  Juice is not a fruit.  Prepare the vegetables in a healthy way - steam, bake.  Avoid batter/breading, butter/oil to cook.  2. Add more fiber to your diet.  Swap out white bread, white rice, white pasta for whole grain versions.  Reduce the portion size and frequency of carbohydrates/starches.  3. Choose healthier fats such as nuts, olive oil, avocado, etc. Stay away from lard, butter.  4. Decrease the frequency and portion size of 'junk food' -pizza, candy, cookies, potato chips, etc.  5. Watch liquid calories such as coffee drinks, juice, soda, teas.  There tends to be excessive sugar in these beverages.  6. Increase protein in your diet.  Eggs, cheese, yogurt, nuts, lentils, chicken, fish are good healthy choices.  Protein keeps you mohr longer, and you are less likely to have blood sugar spikes  7. Eat healthy at least 80% of the time.  It is ok for a special treat (Mom's spaghetti dinner, cake on your birthday) every once in a while, just not every day.  When are you going to indulge (think [place] Fair time), be sure you are eating extra healthy the day before and after.      Resources:    1. [employer] Resources for Health and Wellness:https://www.takingcharmissy.cs.[facility].edu/dig-yes-foods    2.   [facility] Ways To Wellness:    https://www.[facility].org/services/ways-to-wellness  Ways to Wellness offers:    Nutrition and weight management     Corrective exercise and fitness training     Lifestyle and behavioral change     Healing services  Our team includes registered dietitians, certified personal trainers, life  coaches, as well as a Culinary Educator.    3. [facility] for Cardiovascular Disease Prevention  Consider making an appointment at the [facility] if either of the following describes you:    You are an adult who has risk factors for cardiovascular disease. Risk factors include cholesterol elevations, diabetes, high blood pressure, elevated weight, inactive living, and history of tobacco use.     You don t have traditional risk factors but have a strong family history of cardiovascular disease, which may mean you have a higher of risk of cardiovascular disease.     4. Atomic Habits by Benjamin Panchal.  This a good book that looks into our habits and how to sustain good healthy habits and get rid of bad habits.

## 2021-04-12 ENCOUNTER — MYC MEDICAL ADVICE (OUTPATIENT)
Dept: FAMILY MEDICINE | Facility: CLINIC | Age: 71
End: 2021-04-12

## 2021-04-12 DIAGNOSIS — E78.5 HYPERLIPIDEMIA WITH TARGET LDL LESS THAN 100: ICD-10-CM

## 2021-04-12 DIAGNOSIS — E83.52 HYPERCALCEMIA: Primary | ICD-10-CM

## 2021-04-12 DIAGNOSIS — M1A.09X0 IDIOPATHIC CHRONIC GOUT OF MULTIPLE SITES WITHOUT TOPHUS: ICD-10-CM

## 2021-04-12 DIAGNOSIS — E03.9 ACQUIRED HYPOTHYROIDISM: ICD-10-CM

## 2021-04-12 DIAGNOSIS — E83.52 HYPERCALCEMIA: ICD-10-CM

## 2021-04-12 DIAGNOSIS — E66.01 MORBID OBESITY (H): ICD-10-CM

## 2021-04-12 PROBLEM — L82.1 SEBORRHEIC KERATOSIS: Status: RESOLVED | Noted: 2019-03-22 | Resolved: 2021-04-12

## 2021-04-12 PROBLEM — D12.6 TUBULAR ADENOMA OF COLON: Status: RESOLVED | Noted: 2020-07-03 | Resolved: 2021-04-12

## 2021-04-12 LAB
ALT SERPL W P-5'-P-CCNC: 33 U/L (ref 0–70)
ANION GAP SERPL CALCULATED.3IONS-SCNC: 3 MMOL/L (ref 3–14)
BUN SERPL-MCNC: 26 MG/DL (ref 7–30)
CA-I SERPL ISE-MCNC: 5.3 MG/DL (ref 4.4–5.2)
CALCIUM SERPL-MCNC: 9.7 MG/DL (ref 8.5–10.1)
CHLORIDE SERPL-SCNC: 107 MMOL/L (ref 94–109)
CHOLEST SERPL-MCNC: 155 MG/DL
CO2 SERPL-SCNC: 27 MMOL/L (ref 20–32)
CREAT SERPL-MCNC: 1.31 MG/DL (ref 0.66–1.25)
ERYTHROCYTE [DISTWIDTH] IN BLOOD BY AUTOMATED COUNT: 13.3 % (ref 10–15)
GFR SERPL CREATININE-BSD FRML MDRD: 54 ML/MIN/{1.73_M2}
GLUCOSE SERPL-MCNC: 101 MG/DL (ref 70–99)
HCT VFR BLD AUTO: 43.2 % (ref 40–53)
HDLC SERPL-MCNC: 58 MG/DL
HGB BLD-MCNC: 14.3 G/DL (ref 13.3–17.7)
LDLC SERPL CALC-MCNC: 79 MG/DL
MCH RBC QN AUTO: 30.4 PG (ref 26.5–33)
MCHC RBC AUTO-ENTMCNC: 33.1 G/DL (ref 31.5–36.5)
MCV RBC AUTO: 92 FL (ref 78–100)
NONHDLC SERPL-MCNC: 97 MG/DL
PLATELET # BLD AUTO: 161 10E9/L (ref 150–450)
POTASSIUM SERPL-SCNC: 4.7 MMOL/L (ref 3.4–5.3)
PTH-INTACT SERPL-MCNC: 60 PG/ML (ref 18–80)
RBC # BLD AUTO: 4.71 10E12/L (ref 4.4–5.9)
SODIUM SERPL-SCNC: 137 MMOL/L (ref 133–144)
TRIGL SERPL-MCNC: 88 MG/DL
TSH SERPL DL<=0.005 MIU/L-ACNC: 0.59 MU/L (ref 0.4–4)
URATE SERPL-MCNC: 6.2 MG/DL (ref 3.5–7.2)
WBC # BLD AUTO: 7.3 10E9/L (ref 4–11)

## 2021-04-12 PROCEDURE — 82330 ASSAY OF CALCIUM: CPT | Performed by: INTERNAL MEDICINE

## 2021-04-12 PROCEDURE — 83970 ASSAY OF PARATHORMONE: CPT | Performed by: INTERNAL MEDICINE

## 2021-04-12 NOTE — RESULT ENCOUNTER NOTE
Please call patient:    Your calcium level is a bit high. Stop taking vitamin D and follow-up with your new primary care provider for recheck.     Genoveva Bull MD

## 2021-04-12 NOTE — RESULT ENCOUNTER NOTE
Patient should have only had calcium. The remaining labs I ordered in 3/30 were for 1 year from now - this is how they were ordered, please see my note.  Please notify lab to re-order for 1 year from now and credit patient account.    Blood count, electrolytes including calcium are normal.  Uric acid and liver enzyme are normal.  Cholesterol similar to 1 month ago  Kidney function is mildly reduced, similar to 1 month ago.

## 2021-04-13 DIAGNOSIS — M10.9 GOUT, UNSPECIFIED CAUSE, UNSPECIFIED CHRONICITY, UNSPECIFIED SITE: Primary | ICD-10-CM

## 2021-04-13 DIAGNOSIS — E66.01 MORBID OBESITY (H): ICD-10-CM

## 2021-04-13 DIAGNOSIS — E78.5 HYPERLIPIDEMIA WITH TARGET LDL LESS THAN 100: ICD-10-CM

## 2021-04-13 DIAGNOSIS — E03.9 ACQUIRED HYPOTHYROIDISM: ICD-10-CM

## 2021-07-28 ASSESSMENT — SLEEP AND FATIGUE QUESTIONNAIRES
HOW LIKELY ARE YOU TO NOD OFF OR FALL ASLEEP WHILE LYING DOWN TO REST IN THE AFTERNOON WHEN CIRCUMSTANCES PERMIT: MODERATE CHANCE OF DOZING
HOW LIKELY ARE YOU TO NOD OFF OR FALL ASLEEP WHILE SITTING AND TALKING TO SOMEONE: WOULD NEVER DOZE
HOW LIKELY ARE YOU TO NOD OFF OR FALL ASLEEP IN A CAR, WHILE STOPPED FOR A FEW MINUTES IN TRAFFIC: WOULD NEVER DOZE
HOW LIKELY ARE YOU TO NOD OFF OR FALL ASLEEP WHILE SITTING INACTIVE IN A PUBLIC PLACE: SLIGHT CHANCE OF DOZING
HOW LIKELY ARE YOU TO NOD OFF OR FALL ASLEEP WHILE SITTING QUIETLY AFTER LUNCH WITHOUT ALCOHOL: SLIGHT CHANCE OF DOZING
HOW LIKELY ARE YOU TO NOD OFF OR FALL ASLEEP WHEN YOU ARE A PASSENGER IN A CAR FOR AN HOUR WITHOUT A BREAK: SLIGHT CHANCE OF DOZING
HOW LIKELY ARE YOU TO NOD OFF OR FALL ASLEEP WHILE SITTING AND READING: MODERATE CHANCE OF DOZING
HOW LIKELY ARE YOU TO NOD OFF OR FALL ASLEEP WHILE WATCHING TV: MODERATE CHANCE OF DOZING

## 2021-08-02 ENCOUNTER — VIRTUAL VISIT (OUTPATIENT)
Dept: SLEEP MEDICINE | Facility: CLINIC | Age: 71
End: 2021-08-02
Payer: COMMERCIAL

## 2021-08-02 DIAGNOSIS — G47.33 OSA (OBSTRUCTIVE SLEEP APNEA): Primary | ICD-10-CM

## 2021-08-02 PROCEDURE — 99442 PR PHYSICIAN TELEPHONE EVALUATION 11-20 MIN: CPT | Mod: 95 | Performed by: FAMILY MEDICINE

## 2021-08-02 NOTE — PROGRESS NOTES
Wei Kline is a 71 year old male who is being evaluated via a billable telephone visit.       Tele-Visit Details     Type of service:  Video Visit     Start Time: 11am  End Time: 11:15am      Virtual visit for follow-up of their mild obstructive sleep apnea, managed with CPAP.     Impression/Plan:     1.)  History of mild LILLY   - Suspect LILLY is more severe today given an interim ~35-40 lb weight gain   - Appears well controlled with auto-titrate 9-14 cm H2O   - Current device is Respironics Dreamstation Auto, likely affected by recall.  Discussed recall, not using ozone cleaning device, does use heated humidity, discussed inline bacterial filter and to contact DME in regards to ongoing recall process.  He feels with the filter, that the benefit of using CPAP outweighs current risk given very poor sleep quality without CPAP.   - Continue CPAP on currents settings.    SUBJECTIVE:  Wei Kline is a 71 year old year old male.    Pertinent PMHx of obesity, hypothyroidism, CKD III.     Diagnosed with mild LILLY on PSG performed 5/10/2010 at [facility] with weight 256 lbs, AHI 9.9, mean SpO2 91.9%, katlin SpO2 87%.  CPAP 9 cm H2O appeared effective, though no supine REM observed.  Frequent PVC's mentioned.     5/31/2019 - Initial consult to establish care for new supplies and replacement machine.  Appeared well controlled based on download and sleeping well, some feeling of low pressure at start of night on set pressure 9 cm H2O.  Interim weight gain of ~35-40 lbs.  A/P for new CPAP, auto-titrate 9-14 cm H2O.     8/26/2019 - Here for compliance follow-up.  Doing well, no concerns.  CPAP download from 7/24/2019 - 8/22/2019 on auto-titrate 9-14 cm H2O.  Used 30/30 days, average usage 9 hours 5 minutes.  Pressure mean 9.8 cm H2O, 90th%ile 11.1 cm H2O.  AHI 3.1.  A/P to continue CPAP auto 9-14.    Today - He continues to sleep very well with his CPAP.  Feels well rested, no residual snoring or observed apnea.  Notes  "very poor sleep quality and largely \"can't sleep without it\".    Initially attempted as video, but could not get to work on patient's phone.  Completed as telephone visit.    CPAP download from 7/24/2021 - 7/26/2021 on auto-titrate 9-14 cm H2O.  Average daily usage 9 hours 19 minutes.  AHI 2.6.  Dreamstation Auto.    SCARLETT Total Score: 1    Past medical history:    Patient Active Problem List    Diagnosis Date Noted     Hypercalcemia 04/12/2021     Priority: Medium     Morbid obesity (H) 03/22/2019     Priority: Medium     Hyperglycemia 03/22/2019     Priority: Medium     Umbilical hernia without obstruction and without gangrene 03/22/2019     Priority: Medium     Kidney stones 02/04/2015     Priority: Medium     Idiopathic chronic gout of multiple sites without tophus 01/16/2015     Priority: Medium     Diagnosed in 2009 at [facility], no flares since starting allopurinol.         Acquired hypothyroidism 01/16/2015     Priority: Medium     Hyperlipidemia with target LDL less than 100 01/16/2015     Priority: Medium     Hypertension goal BP (blood pressure) < 140/90 01/16/2015     Priority: Medium     History of cardiomyopathy 01/16/2015     Priority: Medium     Had angiogram in 2002- clean arteries.  Thought to be viral.  Mild decrease in EF initially but last echo in 2014 was normal.       LILLY (obstructive sleep apnea) 01/16/2015     Priority: Medium     PSG performed 5/10/2010 at [facility] with weight 256 lbs, AHI 9.9, mean SpO2 91.9%, katlin SpO2 87%.  CPAP 9 cm H2O appeared effective, though no supine REM observed.  Frequent PVC's mentioned.         10 point ROS of systems including Constitutional, Eyes, Respiratory, Cardiovascular, Gastroenterology, Genitourinary, Integumentary, Muscularskeletal, Psychiatric were all negative except for pertinent positives noted in my HPI.    Current Outpatient Medications   Medication Sig Dispense Refill     allopurinol (ZYLOPRIM) 300 MG tablet Take 1 tablet (300 mg) by " mouth daily 90 tablet 4     aspirin (ASA) 325 MG EC tablet Take 1 tablet (325 mg) by mouth daily 30 tablet 0     atorvastatin (LIPITOR) 20 MG tablet Take 1 tablet (20 mg) by mouth daily 90 tablet 3     fluticasone (FLONASE) 50 MCG/ACT nasal spray Spray 1-2 sprays into both nostrils daily 18.2 mL 3     FOLIC ACID PO Take 1 mg by mouth daily With DHA       levothyroxine (SYNTHROID/LEVOTHROID) 137 MCG tablet TAKE 1 TABLET (137 MCG) BY MOUTH DAILY 90 tablet 4     metoprolol tartrate (LOPRESSOR) 25 MG tablet Take 0.5 tablets (12.5 mg) by mouth 2 times daily 90 tablet 4     order for DME Equipment being ordered: Cpap, mask, tubing, and all associated supplies 1 each 0     telmisartan (MICARDIS) 80 MG tablet Take 1 tablet (80 mg) by mouth daily 90 tablet 3     vitamin C (ASCORBIC ACID) 1000 MG TABS Take 1,000 mg by mouth daily         OBJECTIVE:  There were no vitals taken for this visit.    Physical Exam     ---  This note was written with the assistance of the Dragon voice-dictation technology software. The final document, although reviewed, may contain errors. For corrections, please contact the office.    Axel Page MD    Sleep Medicine  [facility]  ([phone])  [facility]  ([phone])

## 2021-08-11 NOTE — NURSING NOTE
One year follow up PAP reminder sent via [employer].           CHANTE Peterson  [facility]

## 2021-09-12 ENCOUNTER — HEALTH MAINTENANCE LETTER (OUTPATIENT)
Age: 71
End: 2021-09-12

## 2021-11-02 ENCOUNTER — TRANSFERRED RECORDS (OUTPATIENT)
Dept: HEALTH INFORMATION MANAGEMENT | Facility: CLINIC | Age: 71
End: 2021-11-02
Payer: COMMERCIAL

## 2021-11-09 ENCOUNTER — TRANSFERRED RECORDS (OUTPATIENT)
Dept: HEALTH INFORMATION MANAGEMENT | Facility: CLINIC | Age: 71
End: 2021-11-09
Payer: COMMERCIAL

## 2022-02-17 PROBLEM — R74.01 NONSPECIFIC ELEVATION OF LEVELS OF TRANSAMINASE AND LACTIC ACID DEHYDROGENASE (LDH): Status: RESOLVED | Noted: 2019-03-22 | Resolved: 2019-12-09

## 2022-02-17 PROBLEM — R74.02 NONSPECIFIC ELEVATION OF LEVELS OF TRANSAMINASE AND LACTIC ACID DEHYDROGENASE (LDH): Status: RESOLVED | Noted: 2019-03-22 | Resolved: 2019-12-09

## 2022-02-21 NOTE — TELEPHONE ENCOUNTER
Problem: Pain  Goal: #Acceptable pain level achieved/maintained at rest using NRS/Faces  Description: This goal is used for patients who can self-report.  Acceptable means the level is at or below the identified comfort/function goal.  Outcome: Outcome Met, Continue evaluating goal progress toward completion  Goal: # Acceptable pain level achieved/maintained at rest using NRS/Faces without oversedation (opioid naive or PCA/Epidural infusion)  Description: This goal is used if Opioid-naïve or on PCA/Epidural Infusion.  Outcome: Outcome Met, Continue evaluating goal progress toward completion  Goal: # Acceptable pain level achieved/maintained with activity using NRS/Faces  Description: This goal is used for patients who can self-report and are not achieving acceptable pain control during activity.  Outcome: Outcome Met, Continue evaluating goal progress toward completion  Goal: Acceptable pain/comfort level is achieved/maintained at rest (based on Pain Behaviors Scale)  Description: This goal is used for patients who are not able to self-report pain and are assessed for pain using the Pain Behaviors Scale  Outcome: Outcome Met, Continue evaluating goal progress toward completion  Goal: Acceptable pain/comfort level is achieved/maintained at rest based on PAINAID scale (Dementia)  Description: This goal is used for patients who are not able to self-report pain, have dementia, and assessed using the PAINAD scale.  Outcome: Outcome Met, Continue evaluating goal progress toward completion  Goal: Acceptable pain/comfort level is achieved/maintained at rest (based on pediatric behavior tool: NIPS, NPASS, or FLACC)  Description: This goal is used for pediatric patients who are not able to self report pain.  Outcome: Outcome Met, Continue evaluating goal progress toward completion  Goal: # Verbalizes understanding of pain management  Description: Documented in Patient Education Activity  Outcome: Outcome Met, Continue  Called patient and patient is taking atrovastatin, with no issues.     Darlene TEIXEIRA CMA ([facility])     evaluating goal progress toward completion  Goal: Verbalizes understanding and effective use of Patient Controlled Analgesia (PCA)  Description: Documented in Patient Education Activity  This goal is used for patients with PCA  Outcome: Outcome Met, Continue evaluating goal progress toward completion  Goal: Maximum comfort achieved/maintained at end of life (Hospice)  Outcome: Outcome Met, Continue evaluating goal progress toward completion   Denies pain.

## 2022-03-10 ASSESSMENT — ENCOUNTER SYMPTOMS
NAUSEA: 0
CONSTIPATION: 0
FEVER: 0
NERVOUS/ANXIOUS: 0
SORE THROAT: 0
COUGH: 0
HEADACHES: 0
HEMATOCHEZIA: 0
FREQUENCY: 0
MYALGIAS: 0
DIZZINESS: 0
WEAKNESS: 0
CHILLS: 0
EYE PAIN: 0
PARESTHESIAS: 0
ABDOMINAL PAIN: 0
ARTHRALGIAS: 0
SHORTNESS OF BREATH: 0
PALPITATIONS: 0
JOINT SWELLING: 0
HEMATURIA: 0
DYSURIA: 0
HEARTBURN: 0
DIARRHEA: 0

## 2022-03-10 ASSESSMENT — ACTIVITIES OF DAILY LIVING (ADL): CURRENT_FUNCTION: NO ASSISTANCE NEEDED

## 2022-03-15 ENCOUNTER — HOSPITAL ENCOUNTER (EMERGENCY)
Facility: CLINIC | Age: 72
Discharge: HOME | End: 2022-03-15
Attending: FAMILY MEDICINE | Admitting: FAMILY MEDICINE
Payer: COMMERCIAL

## 2022-03-15 ENCOUNTER — TELEPHONE (OUTPATIENT)
Dept: FAMILY MEDICINE | Facility: CLINIC | Age: 72
End: 2022-03-15

## 2022-03-15 ENCOUNTER — OFFICE VISIT (OUTPATIENT)
Dept: FAMILY MEDICINE | Facility: CLINIC | Age: 72
End: 2022-03-15
Payer: COMMERCIAL

## 2022-03-15 VITALS
DIASTOLIC BLOOD PRESSURE: 80 MMHG | HEART RATE: 62 BPM | SYSTOLIC BLOOD PRESSURE: 122 MMHG | RESPIRATION RATE: 16 BRPM | WEIGHT: 293.6 LBS | OXYGEN SATURATION: 95 % | HEIGHT: 70 IN | BODY MASS INDEX: 42.03 KG/M2 | TEMPERATURE: 97.7 F

## 2022-03-15 VITALS
SYSTOLIC BLOOD PRESSURE: 141 MMHG | TEMPERATURE: 98.3 F | WEIGHT: 293.65 LBS | BODY MASS INDEX: 42.28 KG/M2 | OXYGEN SATURATION: 95 % | HEART RATE: 78 BPM | DIASTOLIC BLOOD PRESSURE: 89 MMHG | RESPIRATION RATE: 24 BRPM

## 2022-03-15 DIAGNOSIS — E78.5 HYPERLIPIDEMIA WITH TARGET LDL LESS THAN 100: ICD-10-CM

## 2022-03-15 DIAGNOSIS — E03.9 ACQUIRED HYPOTHYROIDISM: ICD-10-CM

## 2022-03-15 DIAGNOSIS — Z12.5 SCREENING FOR PROSTATE CANCER: ICD-10-CM

## 2022-03-15 DIAGNOSIS — E83.52 HYPERCALCEMIA: ICD-10-CM

## 2022-03-15 DIAGNOSIS — E66.01 MORBID OBESITY (H): ICD-10-CM

## 2022-03-15 DIAGNOSIS — E83.52 SERUM CALCIUM ELEVATED: ICD-10-CM

## 2022-03-15 DIAGNOSIS — Z00.00 ENCOUNTER FOR MEDICARE ANNUAL WELLNESS EXAM: Primary | ICD-10-CM

## 2022-03-15 DIAGNOSIS — M10.9 GOUT, UNSPECIFIED CAUSE, UNSPECIFIED CHRONICITY, UNSPECIFIED SITE: ICD-10-CM

## 2022-03-15 DIAGNOSIS — I10 HYPERTENSION GOAL BP (BLOOD PRESSURE) < 140/90: ICD-10-CM

## 2022-03-15 LAB
ALBUMIN SERPL-MCNC: 3.6 G/DL (ref 3.4–5)
ALP SERPL-CCNC: 94 U/L (ref 40–150)
ALT SERPL W P-5'-P-CCNC: 38 U/L (ref 0–70)
ANION GAP SERPL CALCULATED.3IONS-SCNC: 4 MMOL/L (ref 3–14)
ANION GAP SERPL CALCULATED.3IONS-SCNC: 5 MMOL/L (ref 3–14)
AST SERPL W P-5'-P-CCNC: 33 U/L (ref 0–45)
BASOPHILS # BLD AUTO: 0.1 10E3/UL (ref 0–0.2)
BASOPHILS # BLD MANUAL: 0 10E3/UL (ref 0–0.2)
BASOPHILS NFR BLD AUTO: 1 %
BASOPHILS NFR BLD MANUAL: 0 %
BILIRUB SERPL-MCNC: 0.6 MG/DL (ref 0.2–1.3)
BUN SERPL-MCNC: 26 MG/DL (ref 7–30)
BUN SERPL-MCNC: 27 MG/DL (ref 7–30)
CA-I BLD-MCNC: 5.2 MG/DL (ref 4.4–5.2)
CA-I BLD-MCNC: 5.5 MG/DL (ref 4.4–5.2)
CALCIUM SERPL-MCNC: 10.3 MG/DL (ref 8.5–10.1)
CALCIUM SERPL-MCNC: 10.5 MG/DL (ref 8.5–10.1)
CHLORIDE BLD-SCNC: 108 MMOL/L (ref 94–109)
CHLORIDE BLD-SCNC: 110 MMOL/L (ref 94–109)
CHOLEST SERPL-MCNC: 175 MG/DL
CO2 SERPL-SCNC: 27 MMOL/L (ref 20–32)
CO2 SERPL-SCNC: 28 MMOL/L (ref 20–32)
CREAT SERPL-MCNC: 1.3 MG/DL (ref 0.66–1.25)
CREAT SERPL-MCNC: 1.33 MG/DL (ref 0.66–1.25)
EOSINOPHIL # BLD AUTO: 0.3 10E3/UL (ref 0–0.7)
EOSINOPHIL # BLD MANUAL: 0 10E3/UL (ref 0–0.7)
EOSINOPHIL NFR BLD AUTO: 4 %
EOSINOPHIL NFR BLD MANUAL: 1 %
ERYTHROCYTE [DISTWIDTH] IN BLOOD BY AUTOMATED COUNT: 13.3 % (ref 10–15)
ERYTHROCYTE [DISTWIDTH] IN BLOOD BY AUTOMATED COUNT: 13.6 % (ref 10–15)
FASTING STATUS PATIENT QL REPORTED: NORMAL
GFR SERPL CREATININE-BSD FRML MDRD: 56 ML/MIN/1.73M2
GFR SERPL CREATININE-BSD FRML MDRD: 58 ML/MIN/1.73M2
GLUCOSE BLD-MCNC: 104 MG/DL (ref 70–99)
GLUCOSE BLD-MCNC: 87 MG/DL (ref 70–99)
HCT VFR BLD AUTO: 15 % (ref 40–53)
HCT VFR BLD AUTO: 43.5 % (ref 40–53)
HDLC SERPL-MCNC: 64 MG/DL
HGB BLD-MCNC: 15 G/DL (ref 13.3–17.7)
HGB BLD-MCNC: 5 G/DL (ref 13.3–17.7)
HOLD SPECIMEN: NORMAL
HOLD SPECIMEN: NORMAL
IMM GRANULOCYTES # BLD: 0 10E3/UL
IMM GRANULOCYTES NFR BLD: 0 %
LACTATE SERPL-SCNC: 0.9 MMOL/L (ref 0.7–2)
LDLC SERPL CALC-MCNC: 91 MG/DL
LYMPHOCYTES # BLD AUTO: 2.1 10E3/UL (ref 0.8–5.3)
LYMPHOCYTES # BLD MANUAL: 0.4 10E3/UL (ref 0.8–5.3)
LYMPHOCYTES NFR BLD AUTO: 24 %
LYMPHOCYTES NFR BLD MANUAL: 16 %
MCH RBC QN AUTO: 31.4 PG (ref 26.5–33)
MCH RBC QN AUTO: 31.6 PG (ref 26.5–33)
MCHC RBC AUTO-ENTMCNC: 33.3 G/DL (ref 31.5–36.5)
MCHC RBC AUTO-ENTMCNC: 34.5 G/DL (ref 31.5–36.5)
MCV RBC AUTO: 92 FL (ref 78–100)
MCV RBC AUTO: 94 FL (ref 78–100)
MONOCYTES # BLD AUTO: 1 10E3/UL (ref 0–1.3)
MONOCYTES # BLD MANUAL: 0.1 10E3/UL (ref 0–1.3)
MONOCYTES NFR BLD AUTO: 12 %
MONOCYTES NFR BLD MANUAL: 2 %
NEUTROPHILS # BLD AUTO: 5.3 10E3/UL (ref 1.6–8.3)
NEUTROPHILS # BLD MANUAL: 2.3 10E3/UL (ref 1.6–8.3)
NEUTROPHILS NFR BLD AUTO: 59 %
NEUTROPHILS NFR BLD MANUAL: 81 %
NONHDLC SERPL-MCNC: 111 MG/DL
NRBC # BLD AUTO: 0 10E3/UL
NRBC # BLD AUTO: 0 10E3/UL
NRBC BLD AUTO-RTO: 0 /100
NRBC BLD AUTO-RTO: 0 /100
PLAT MORPH BLD: ABNORMAL
PLATELET # BLD AUTO: 147 10E3/UL (ref 150–450)
PLATELET # BLD AUTO: 31 10E3/UL (ref 150–450)
POTASSIUM BLD-SCNC: 4.3 MMOL/L (ref 3.4–5.3)
POTASSIUM BLD-SCNC: 4.7 MMOL/L (ref 3.4–5.3)
PROT SERPL-MCNC: 7.6 G/DL (ref 6.8–8.8)
PSA SERPL-MCNC: 1.38 UG/L (ref 0–4)
RBC # BLD AUTO: 1.59 10E6/UL (ref 4.4–5.9)
RBC # BLD AUTO: 4.75 10E6/UL (ref 4.4–5.9)
RBC MORPH BLD: ABNORMAL
RETICS # AUTO: 26.1 10E6/UL (ref 0.03–0.1)
RETICS/RBC NFR AUTO: 1.6 % (ref 0.5–2)
SODIUM SERPL-SCNC: 140 MMOL/L (ref 133–144)
SODIUM SERPL-SCNC: 142 MMOL/L (ref 133–144)
TRIGL SERPL-MCNC: 99 MG/DL
TSH SERPL DL<=0.005 MIU/L-ACNC: 0.69 MU/L (ref 0.4–4)
URATE SERPL-MCNC: 6.8 MG/DL (ref 3.5–7.2)
WBC # BLD AUTO: 2.8 10E3/UL (ref 4–11)
WBC # BLD AUTO: 8.8 10E3/UL (ref 4–11)

## 2022-03-15 PROCEDURE — G0103 PSA SCREENING: HCPCS | Performed by: INTERNAL MEDICINE

## 2022-03-15 PROCEDURE — 85027 COMPLETE CBC AUTOMATED: CPT | Performed by: FAMILY MEDICINE

## 2022-03-15 PROCEDURE — 82306 VITAMIN D 25 HYDROXY: CPT

## 2022-03-15 PROCEDURE — 99397 PER PM REEVAL EST PAT 65+ YR: CPT | Performed by: INTERNAL MEDICINE

## 2022-03-15 PROCEDURE — 83970 ASSAY OF PARATHORMONE: CPT

## 2022-03-15 PROCEDURE — 36415 COLL VENOUS BLD VENIPUNCTURE: CPT | Performed by: INTERNAL MEDICINE

## 2022-03-15 PROCEDURE — 85060 BLOOD SMEAR INTERPRETATION: CPT | Performed by: PATHOLOGY

## 2022-03-15 PROCEDURE — 36415 COLL VENOUS BLD VENIPUNCTURE: CPT | Performed by: FAMILY MEDICINE

## 2022-03-15 PROCEDURE — 99282 EMERGENCY DEPT VISIT SF MDM: CPT | Performed by: FAMILY MEDICINE

## 2022-03-15 PROCEDURE — 80061 LIPID PANEL: CPT | Performed by: INTERNAL MEDICINE

## 2022-03-15 PROCEDURE — 82330 ASSAY OF CALCIUM: CPT | Performed by: INTERNAL MEDICINE

## 2022-03-15 PROCEDURE — 80053 COMPREHEN METABOLIC PANEL: CPT | Performed by: INTERNAL MEDICINE

## 2022-03-15 PROCEDURE — 85045 AUTOMATED RETICULOCYTE COUNT: CPT | Performed by: INTERNAL MEDICINE

## 2022-03-15 PROCEDURE — 99283 EMERGENCY DEPT VISIT LOW MDM: CPT

## 2022-03-15 PROCEDURE — 82330 ASSAY OF CALCIUM: CPT | Performed by: FAMILY MEDICINE

## 2022-03-15 PROCEDURE — 83605 ASSAY OF LACTIC ACID: CPT | Performed by: FAMILY MEDICINE

## 2022-03-15 PROCEDURE — 84443 ASSAY THYROID STIM HORMONE: CPT | Performed by: INTERNAL MEDICINE

## 2022-03-15 PROCEDURE — 84550 ASSAY OF BLOOD/URIC ACID: CPT | Performed by: INTERNAL MEDICINE

## 2022-03-15 PROCEDURE — 99214 OFFICE O/P EST MOD 30 MIN: CPT | Mod: 25 | Performed by: INTERNAL MEDICINE

## 2022-03-15 RX ORDER — MULTIVIT-MIN/IRON/FOLIC ACID/K 18-600-40
2000 CAPSULE ORAL DAILY
COMMUNITY
End: 2023-03-21

## 2022-03-15 RX ORDER — ATORVASTATIN CALCIUM 20 MG/1
20 TABLET, FILM COATED ORAL DAILY
Qty: 90 TABLET | Refills: 3 | Status: SHIPPED | OUTPATIENT
Start: 2022-03-15 | End: 2023-03-21

## 2022-03-15 RX ORDER — METOPROLOL TARTRATE 25 MG/1
12.5 TABLET, FILM COATED ORAL 2 TIMES DAILY
Qty: 90 TABLET | Refills: 4 | Status: SHIPPED | OUTPATIENT
Start: 2022-03-15 | End: 2023-03-21

## 2022-03-15 RX ORDER — SODIUM CHLORIDE 9 MG/ML
INJECTION, SOLUTION INTRAVENOUS CONTINUOUS
Status: DISCONTINUED | OUTPATIENT
Start: 2022-03-15 | End: 2022-03-15

## 2022-03-15 RX ORDER — LEVOTHYROXINE SODIUM 137 UG/1
TABLET ORAL
Qty: 90 TABLET | Refills: 4 | Status: SHIPPED | OUTPATIENT
Start: 2022-03-15 | End: 2023-03-21

## 2022-03-15 RX ORDER — TELMISARTAN 80 MG/1
80 TABLET ORAL DAILY
Qty: 90 TABLET | Refills: 3 | Status: SHIPPED | OUTPATIENT
Start: 2022-03-15 | End: 2023-03-21

## 2022-03-15 RX ORDER — ALLOPURINOL 300 MG/1
300 TABLET ORAL DAILY
Qty: 90 TABLET | Refills: 4 | Status: SHIPPED | OUTPATIENT
Start: 2022-03-15 | End: 2023-03-21

## 2022-03-15 ASSESSMENT — ENCOUNTER SYMPTOMS
DYSURIA: 0
FREQUENCY: 0
MYALGIAS: 0
NERVOUS/ANXIOUS: 0
ABDOMINAL PAIN: 0
HEMATOCHEZIA: 0
DIARRHEA: 0
ARTHRALGIAS: 0
CONSTIPATION: 0
EYE PAIN: 0
HEADACHES: 0
HEMATURIA: 0
FEVER: 0
HEARTBURN: 0
SHORTNESS OF BREATH: 0
PALPITATIONS: 0
PARESTHESIAS: 0
DIZZINESS: 0
SORE THROAT: 0
COUGH: 0
WEAKNESS: 0
JOINT SWELLING: 0
CHILLS: 0
NAUSEA: 0

## 2022-03-15 ASSESSMENT — PAIN SCALES - GENERAL: PAINLEVEL: NO PAIN (0)

## 2022-03-15 ASSESSMENT — ACTIVITIES OF DAILY LIVING (ADL): CURRENT_FUNCTION: NO ASSISTANCE NEEDED

## 2022-03-15 NOTE — PROGRESS NOTES
"SUBJECTIVE:   Wei Kline is a 72 year old male who presents for Preventive Visit.      Patient has been advised of split billing requirements and indicates understanding: Yes  Are you in the first 12 months of your Medicare coverage?  No    Healthy Habits:     In general, how would you rate your overall health?  Good    Frequency of exercise:  6-7 days/week    Duration of exercise:  45-60 minutes    Do you usually eat at least 4 servings of fruit and vegetables a day, include whole grains    & fiber and avoid regularly eating high fat or \"junk\" foods?  No    Taking medications regularly:  Yes    Medication side effects:  None    Ability to successfully perform activities of daily living:  No assistance needed    Home Safety:  No safety concerns identified    Hearing Impairment:  No hearing concerns    In the past 6 months, have you been bothered by leaking of urine?  No    In general, how would you rate your overall mental or emotional health?  Excellent      PHQ-2 Total Score: 0    Additional concerns today:  No      Wt last year was 204 - this is error per patient, was probably 294    Gout:   --on allopurinol  --last episode of gout was 10+ years ago  --prefers to stay on medication    Hypertension:   --no side effects to meds  --blood pressure well controlled    Hyperlipidemia: well controlled on meds    High calcium: was on high dose vitamin D 1 year ago - thought to lead to high Ca.  Last month he restarted Vitamin D; thinks 2000 units.    Do you feel safe in your environment? Yes    Have you ever done Advance Care Planning? (For example, a Health Directive, POLST, or a discussion with a medical provider or your loved ones about your wishes): No, advance care planning information given to patient to review.  Patient declined advance care planning discussion at this time.       Fall risk  Fallen 2 or more times in the past year?: No  Any fall with injury in the past year?: No  click delete button to remove " this line now  Cognitive Screening   1) Repeat 3 items (Leader, Season, Table)    2) Clock draw: NORMAL  3) 3 item recall: Recalls 3 objects  Results: 3 items recalled: COGNITIVE IMPAIRMENT LESS LIKELY    Mini-CogTM Copyright S Renny. Licensed by the author for use in [facility]; reprinted with permission (robb@[email or web address]). All rights reserved.      Do you have sleep apnea, excessive snoring or daytime drowsiness?: yes    Reviewed and updated as needed this visit by clinical staff   Tobacco  Allergies    Med Hx  Surg Hx  Fam Hx  Soc Hx        Reviewed and updated as needed this visit by Provider                 Social History     Tobacco Use     Smoking status: Former Smoker     Packs/day: 0.50     Years: 5.00     Pack years: 2.50     Types: Cigarettes     Start date: 1955     Quit date: 1960     Years since quittin.2     Smokeless tobacco: Never Used   Substance Use Topics     Alcohol use: Yes     Comment: occasional     If you drink alcohol do you typically have >3 drinks per day or >7 drinks per week? No    No flowsheet data found.      Current providers sharing in care for this patient include:   Patient Care Team:  Mini Gillis DO as PCP - General (Internal Medicine)  Mini Gillis DO as Assigned PCP  Axel Page MD as Assigned Sleep Provider    The following health maintenance items are reviewed in Epic and correct as of today:  Health Maintenance Due   Topic Date Due     ANNUAL REVIEW OF  ORDERS  Never done     FALL RISK ASSESSMENT  2021     LIPID  2022     CBC  2022     URIC ACID  2022     Current Outpatient Medications   Medication Sig Dispense Refill     allopurinol (ZYLOPRIM) 300 MG tablet Take 1 tablet (300 mg) by mouth daily 90 tablet 4     aspirin (ASA) 325 MG EC tablet Take 1 tablet (325 mg) by mouth daily 30 tablet 0     atorvastatin (LIPITOR) 20 MG tablet Take 1 tablet (20 mg) by mouth daily 90 tablet 3      "fluticasone (FLONASE) 50 MCG/ACT nasal spray Spray 1-2 sprays into both nostrils daily 18.2 mL 3     FOLIC ACID PO Take 1 mg by mouth daily With DHA       levothyroxine (SYNTHROID/LEVOTHROID) 137 MCG tablet TAKE 1 TABLET (137 MCG) BY MOUTH DAILY 90 tablet 4     metoprolol tartrate (LOPRESSOR) 25 MG tablet Take 0.5 tablets (12.5 mg) by mouth 2 times daily 90 tablet 4     order for DME Equipment being ordered: Cpap, mask, tubing, and all associated supplies 1 each 0     telmisartan (MICARDIS) 80 MG tablet Take 1 tablet (80 mg) by mouth daily 90 tablet 3     vitamin C (ASCORBIC ACID) 1000 MG TABS Take 1,000 mg by mouth daily         Review of Systems   Constitutional: Negative for chills and fever.   HENT: Negative for congestion, ear pain, hearing loss and sore throat.    Eyes: Negative for pain and visual disturbance.   Respiratory: Negative for cough and shortness of breath.    Cardiovascular: Negative for chest pain, palpitations and peripheral edema.   Gastrointestinal: Negative for abdominal pain, constipation, diarrhea, heartburn, hematochezia and nausea.   Genitourinary: Negative for dysuria, frequency, genital sores, hematuria, impotence, penile discharge and urgency.   Musculoskeletal: Negative for arthralgias, joint swelling and myalgias.   Skin: Negative for rash.   Neurological: Negative for dizziness, weakness, headaches and paresthesias.   Psychiatric/Behavioral: Negative for mood changes. The patient is not nervous/anxious.          OBJECTIVE:   /80 (BP Location: Right arm, Patient Position: Sitting, Cuff Size: Adult Large)   Pulse 62   Temp 97.7  F (36.5  C)   Resp 16   Ht 1.775 m (5' 9.88\")   Wt 133.2 kg (293 lb 9.6 oz)   SpO2 95%   BMI 42.27 kg/m   Estimated body mass index is 42.27 kg/m  as calculated from the following:    Height as of this encounter: 1.775 m (5' 9.88\").    Weight as of this encounter: 133.2 kg (293 lb 9.6 oz).  Physical Exam  GENERAL: healthy, alert, no distress and " obese  EYES: Eyes grossly normal to inspection, PERRL and conjunctivae and sclerae normal  HENT: ear canals and TM's normal, nose and mouth without ulcers or lesions  NECK: no adenopathy, no asymmetry, masses, or scars and thyroid normal to palpation  RESP: lungs clear to auscultation - no rales, rhonchi or wheezes  CV: regular rate and rhythm, normal S1 S2, no S3 or S4, no murmur, click or rub, no peripheral edema and peripheral pulses strong  ABDOMEN: soft, nontender, without hepatosplenomegaly or masses, bowel sounds normal and significant abdominal obesity  MS: no gross musculoskeletal defects noted, no edema  SKIN: no suspicious lesions or rashes  NEURO: Normal strength and tone, mentation intact and speech normal  PSYCH: mentation appears normal, affect normal/bright        ASSESSMENT / PLAN:       ICD-10-CM    1. Encounter for Medicare annual wellness exam  Z00.00    2. Gout, unspecified cause, unspecified chronicity, unspecified site  M10.9 allopurinol (ZYLOPRIM) 300 MG tablet     Uric acid     CBC with platelets   3. Hyperlipidemia with target LDL less than 100  E78.5 atorvastatin (LIPITOR) 20 MG tablet     Lipid panel reflex to direct LDL Fasting   4. Acquired hypothyroidism  E03.9 levothyroxine (SYNTHROID/LEVOTHROID) 137 MCG tablet     TSH with free T4 reflex   5. Hypertension goal BP (blood pressure) < 140/90  I10 metoprolol tartrate (LOPRESSOR) 25 MG tablet     telmisartan (MICARDIS) 80 MG tablet     Basic metabolic panel  (Ca, Cl, CO2, Creat, Gluc, K, Na, BUN)     Ionized Calcium   6. Serum calcium elevated  E83.52 Ionized Calcium   7. Morbid obesity (H)  E66.01        1. Ok to stop Aspirin  2. Continue other medications  3. Blood work today  4. May consider Vitamin D only in winter months and just Multivitamin or skip vitamins altogether      Patient has been advised of split billing requirements and indicates understanding: Yes    COUNSELING:  Reviewed preventive health counseling, as reflected in  "patient instructions    Estimated body mass index is 42.27 kg/m  as calculated from the following:    Height as of this encounter: 1.775 m (5' 9.88\").    Weight as of this encounter: 133.2 kg (293 lb 9.6 oz).    Weight management plan: Discussed healthy diet and exercise guidelines    He reports that he quit smoking about 62 years ago. His smoking use included cigarettes. He started smoking about 67 years ago. He has a 2.50 pack-year smoking history. He has never used smokeless tobacco.      Appropriate preventive services were discussed with this patient, including applicable screening as appropriate for cardiovascular disease, diabetes, osteopenia/osteoporosis, and glaucoma.  As appropriate for age/gender, discussed screening for colorectal cancer, prostate cancer, breast cancer, and cervical cancer. Checklist reviewing preventive services available has been given to the patient.    Reviewed patients plan of care and provided an AVS. The Complex Care Plan (for patients with higher acuity and needing more deliberate coordination of services) for Wei meets the Care Plan requirement. This Care Plan has been established and reviewed with the Patient.    Counseling Resources:  ATP IV Guidelines  Pooled Cohorts Equation Calculator  Breast Cancer Risk Calculator  Breast Cancer: Medication to Reduce Risk  FRAX Risk Assessment  ICSI Preventive Guidelines  Dietary Guidelines for Americans, 2010  USDA's MyPlate  ASA Prophylaxis  Lung CA Screening    Mini Gillis DO  [facility]    Identified Health Risks:    The patient was counseled and encouraged to consider modifying their diet and eating habits. He was provided with information on recommended healthy diet options.          Addendum: After visit he had critically low pancytopenia on his CBC.  Lab ran confirmation test and values appear real.  He was minimally symptomatic in clinic today.  He also has hypercalcemia, which is initially thought " due to excessive vitamin D intake by his previous PCP 1 year ago.  The hypercalcemia persist even though he had been off vitamin D for over 1 year.  Advised patient to go to the ER and he is agreeable, will have his wife drive him.  Most worried about malignancy particularly bone marrow malignancy such as multiple myeloma.  Needs full evaluation to determine the cause of the pancytopenia, likely needs a phone consult with heme-onc while in the ER to help guide next step in diagnostic/treatment

## 2022-03-15 NOTE — TELEPHONE ENCOUNTER
Discussed critical CBC with patient.  Advised ER due to critically low Hgb.  Patient will have wife drive him; report given to ER provider.

## 2022-03-15 NOTE — PATIENT INSTRUCTIONS
Patient Education   Personalized Prevention Plan  You are due for the preventive services outlined below.  Your care team is available to assist you in scheduling these services.  If you have already completed any of these items, please share that information with your care team to update in your medical record.  Health Maintenance Due   Topic Date Due     ANNUAL REVIEW OF HM ORDERS  Never done     FALL RISK ASSESSMENT  06/02/2021     Cholesterol Lab  04/12/2022     Complete Blood Count  04/12/2022     Uric Acid Levels  04/12/2022       Understanding USDA MyPlate  The USDA has guidelines to help you make healthy food choices. These are called MyPlate. MyPlate shows the food groups that make up healthy meals using the image of a place setting. Before you eat, think about the healthiest choices for what to put on your plate or in your cup or bowl. To learn more about building a healthy plate, visit www.choosemyplate.gov.    The food groups    Fruits. Any fruit or 100% fruit juice counts as part of the Fruit Group. Fruits may be fresh, canned, frozen, or dried, and may be whole, cut-up, or pureed. Make 1/2 of your plate fruits and vegetables.    Vegetables. Any vegetable or 100% vegetable juice counts as a member of the Vegetable Group. Vegetables may be fresh, frozen, canned, or dried. They can be served raw or cooked and may be whole, cut-up, or mashed. Make 1/2 of your plate fruits and vegetables.    Grains. All foods made from grains are part of the Grains Group. These include wheat, rice, oats, cornmeal, and barley. Grains are often used to make foods such as bread, pasta, oatmeal, cereal, tortillas, and grits. Grains should be no more than 1/4 of your plate. At least half of your grains should be whole grains.    Protein. This group includes meat, poultry, seafood, beans and peas, eggs, processed soy products (such as tofu), nuts (including nut butters), and seeds. Make protein choices no more than 1/4 of your  plate. Meat and poultry choices should be lean or low fat.    Dairy. The Dairy Group includes all fluid milk products and foods made from milk that contain calcium, such as yogurt and cheese. (Foods that have little calcium, such as cream, butter, and cream cheese, are not part of this group.) Most dairy choices should be low-fat or fat-free.    Oils. Oils aren't a food group, but they do contain essential nutrients. However it's important to watch your intake of oils. These are fats that are liquid at room temperature. They include canola, corn, olive, soybean, vegetable, and sunflower oil. Foods that are mainly oil include mayonnaise, certain salad dressings, and soft margarines. You likely already get your daily oil allowance from the foods you eat.  Things to limit  Eating healthy also means limiting these things in your diet:       Salt (sodium). Many processed foods have a lot of sodium. To keep sodium intake down, eat fresh vegetables, meats, poultry, and seafood when possible. Purchase low-sodium, reduced-sodium, or no-salt-added food products at the store. And don't add salt to your meals at home. Instead, season them with herbs and spices such as dill, oregano, cumin, and paprika. Or try adding flavor with lemon or lime zest and juice.    Saturated fat. Saturated fats are most often found in animal products such as beef, pork, and chicken. They are often solid at room temperature, such as butter. To reduce your saturated fat intake, choose leaner cuts of meat and poultry. And try healthier cooking methods such as grilling, broiling, roasting, or baking. For a simple lower-fat swap, use plain nonfat yogurt instead of mayonnaise when making potato salad or macaroni salad.    Added sugars. These are sugars added to foods. They are in foods such as ice cream, candy, soda, fruit drinks, sports drinks, energy drinks, cookies, pastries, jams, and syrups. Cut down on added sugars by sharing sweet treats with a  family member or friend. You can also choose fruit for dessert, and drink water or other unsweetened beverages.     [employer] last reviewed this educational content on 6/1/2020 2000-2021 [employer]. All rights reserved. This information is not intended as a substitute for professional medical care. Always follow your healthcare professional's instructions.              1. Ok to stop Aspirin  2. Continue other medications  3. Blood work today  4. May consider Vitamin D only in winter months and just Multivitamin or skip vitamins altogether        Dr. Gillis's Tips for a Healthy Diet    1. Add more fresh fruits and vegetables to your diet.  The more colorful with the fruit or vegetable (think berries, spinach, carrots, peppers) the healthier it tends to be.  Juice is not a fruit.  Prepare the vegetables in a healthy way - steam, bake.  Avoid batter/breading, butter/oil to cook.  2. Add more fiber to your diet.  Swap out white bread, white rice, white pasta for whole grain versions.  Reduce the portion size and frequency of carbohydrates/starches.  3. Choose healthier fats such as nuts, olive oil, avocado, etc. Stay away from lard, butter.  4. Decrease the frequency and portion size of 'junk food' -pizza, candy, cookies, potato chips, etc.  5. Watch liquid calories such as coffee drinks, juice, soda, teas.  There tends to be excessive sugar in these beverages.  6. Increase protein in your diet.  Eggs, cheese, yogurt, nuts, lentils, chicken, fish are good healthy choices.  Protein keeps you mohr longer, and you are less likely to have blood sugar spikes  7. Eat healthy at least 80% of the time.  It is ok for a special treat (Mom's spaghetti dinner, cake on your birthday) every once in a while, just not every day.  When are you going to indulge (think [place] [employer] time), be sure you are eating extra healthy the day before and after.      Resources:    1. [place] Resources for Health and  Wellness:[email or web address]-foods    2.   [facility] To Wellness:    https://www.[facility].org/services/ways-to-wellness  Ways to Wellness offers:    Nutrition and weight management     Corrective exercise and fitness training     Lifestyle and behavioral change     Healing services  Our team includes registered dietitians, certified personal trainers, life coaches, as well as a Culinary Educator.    3. [facility] for Cardiovascular Disease Prevention  Consider making an appointment at the [facility] if either of the following describes you:    You are an adult who has risk factors for cardiovascular disease. Risk factors include cholesterol elevations, diabetes, high blood pressure, elevated weight, inactive living, and history of tobacco use.     You don t have traditional risk factors but have a strong family history of cardiovascular disease, which may mean you have a higher of risk of cardiovascular disease.     4. Atomic Habits by Benjamin Panchal.  This a good book that looks into our habits and how to sustain good healthy habits and get rid of bad habits.

## 2022-03-16 DIAGNOSIS — E83.52 HYPERCALCEMIA: Primary | ICD-10-CM

## 2022-03-16 LAB
PATH REPORT.COMMENTS IMP SPEC: NORMAL
PATH REPORT.COMMENTS IMP SPEC: NORMAL
PATH REPORT.FINAL DX SPEC: NORMAL
PATH REPORT.MICROSCOPIC SPEC OTHER STN: NORMAL
PATH REPORT.MICROSCOPIC SPEC OTHER STN: NORMAL
PTH-INTACT SERPL-MCNC: 58 PG/ML (ref 18–80)

## 2022-03-16 NOTE — ED NOTES
MD updated on resulted lab values. MD speaking with patient. Awaiting further lab results, and further MD orders. Pt VSS. Pt resting with no further needs at this time.

## 2022-03-16 NOTE — RESULT ENCOUNTER NOTE
Repeat blood test for the complete blood count in the ER were all normal which is reassuring.  No specific follow-up is needed for this.There is still very mildly elevated calcium of unclear cause.  I will add on a vitamin D and parathyroid level to the blood that is already drawn.    Because of the lab error, I did file a safety event report for this, just so you are aware

## 2022-03-16 NOTE — DISCHARGE INSTRUCTIONS
Lab diagnostics as discussed with you on repeat draw here in the emergency department showing no significant abnormals with respect to hemoglobin, white count or platelets.  Your ionized calcium is normal.  Please follow-up with your regular primary care provider Dr. Gillis as needed.

## 2022-03-16 NOTE — ED NOTES
Pt arrived via triage accomp by wife, sent from clinic with low Hgb.  Pt was just in for a yearly visit.  Pt denies any s/s: no sob, cp, being tired or fatigued, denies black stool, no abd pain.          Pt placed on full monitors, 18g IV started in L AC, labs drawn and sent.  MD in room for assessment and exam.   Cardiac:  Difficult to hear due to body habitus.   NSR with occasional PVC's and non perfusing P waves noted.  Lungs:  CTA and again difficult to hear  Abd;  Morbidly obese, +BS, pt denies black stool  PLAN:  Will await lab results and further orders.

## 2022-03-16 NOTE — ED PROVIDER NOTES
History     Chief Complaint   Patient presents with     Abnormal Labs     coming from home. had a routine physical today. hgb 5, wbc 2.8, plt 31     HPI  Wei Kline is a 72 year old male, past medical history is significant for morbid obesity, hypercalcemia, hypertriglyceridemia, urolithiasis, gout, hypothyroidism, hypertension, history of cardiomyopathy, LILLY, presents to the emergency department at the direction of his primary care clinic after having a routine physical earlier today and reportedly a hemoglobin of 5.  History is obtained from the patient and his wife with whom he presents.  He states has been feeling fine and had no specific concerns when he went to his physical today with Dr. iMni Singh.  He was contacted after his blood draws his hemoglobin was 5 and remainder of results of the hemogram are pending.  The patient denies any lightheadedness nausea vomiting chest pain shortness of air abdominal pain back pain nausea or vomiting.  He denies any black or bloody appearing emesis, no coffee-ground emesis, he denies any change in stool or frequency recently.  Denies black or bloody stool specifically.  I reviewed his note from his primary care provider today I note that she has discontinued his baby aspirin that he has been on for many years according to his wife as of today.  The patient drinks infrequent alcohol.  Nothing recently.    Allergies:  Allergies   Allergen Reactions     Nka [No Known Allergies]        Problem List:    Patient Active Problem List    Diagnosis Date Noted     Hypercalcemia 04/12/2021     Priority: Medium     Morbid obesity (H) 03/22/2019     Priority: Medium     Hyperglycemia 03/22/2019     Priority: Medium     Umbilical hernia without obstruction and without gangrene 03/22/2019     Priority: Medium     Kidney stones 02/04/2015     Priority: Medium     Idiopathic chronic gout of multiple sites without tophus 01/16/2015     Priority: Medium     Diagnosed in 2009 at  [facility], no flares since starting allopurinol.         Acquired hypothyroidism 01/16/2015     Priority: Medium     Hyperlipidemia with target LDL less than 100 01/16/2015     Priority: Medium     Hypertension goal BP (blood pressure) < 140/90 01/16/2015     Priority: Medium     History of cardiomyopathy 01/16/2015     Priority: Medium     Had angiogram in 2002- clean arteries.  Thought to be viral.  Mild decrease in EF initially but last echo in 2014 was normal.       LILLY (obstructive sleep apnea) 01/16/2015     Priority: Medium     PSG performed [facility]/10/[facility] at [facility] with weight 256 lbs, AHI 9.9, mean SpO2 91.9%, katlin SpO2 87%.  CPAP 9 cm H2O appeared effective, though no supine REM observed.  Frequent PVC's mentioned.          Past Medical History:    Past Medical History:   Diagnosis Date     Gout, unspecified 01/16/2015     History of cardiomyopathy 01/16/2015     History of Lyme disease 01/16/2015     Hyperlipidemia LDL goal < 100 01/16/2015     Hypertension goal BP (blood pressure) < 140/90 01/16/2015     Hypothyroidism 01/16/2015     Kidney stone      Morbid obesity (H)      LILLY (obstructive sleep apnea)      Umbilical hernia without obstruction and without gangrene        Past Surgical History:    Past Surgical History:   Procedure Laterality Date     arthroscopic knee surgery  1990's    bilateral knees (no replacement)     ARTHROSCOPY ANKLE, OPEN REPAIR LIGAMENT, COMBINED Left 12/26/2019    Procedure: Left Ankle: arthroscopy - eval and debridement; cartilage repair procedures; lateral ankle ligament repair; gastroc recession;  Surgeon: Sandro Castro DPM;  Location: [facility]     COLONOSCOPY N/A 6/30/2020    Procedure: COLONOSCOPY, WITH POLYPECTOMY AND BIOPSY;  Surgeon: Wes Dowd MD;  Location: [facility]     TONSILLECTOMY         Family History:    Family History   Problem Relation Age of Onset     Breast Cancer Mother      Cancer Father      Cerebrovascular Disease Paternal Grandmother       Cancer - colorectal Paternal Grandfather      Cancer - colorectal Brother      Diabetes Brother      Peptic Ulcer Disease Brother      Lupus Sister      Hypertension Son      Diabetes Brother      Colon Cancer Brother      Hypertension Son        Social History:  Marital Status:   [2]  Social History     Tobacco Use     Smoking status: Former Smoker     Packs/day: 0.50     Years: 5.00     Pack years: 2.50     Types: Cigarettes     Start date: 1955     Quit date: 1960     Years since quittin.2     Smokeless tobacco: Never Used   Vaping Use     Vaping Use: Never used   Substance Use Topics     Alcohol use: Yes     Comment: occasional     Drug use: No        Medications:    allopurinol (ZYLOPRIM) 300 MG tablet  atorvastatin (LIPITOR) 20 MG tablet  fluticasone (FLONASE) 50 MCG/ACT nasal spray  FOLIC ACID PO  levothyroxine (SYNTHROID/LEVOTHROID) 137 MCG tablet  metoprolol tartrate (LOPRESSOR) 25 MG tablet  order for DME  telmisartan (MICARDIS) 80 MG tablet  vitamin C (ASCORBIC ACID) 1000 MG TABS  Vitamin D, Cholecalciferol, 25 MCG (1000 UT) TABS          Review of Systems   All other systems reviewed and are negative.      Physical Exam   BP: (!) 146/96  Pulse: 93  Temp: 98.3  F (36.8  C)  Resp: 18  Weight: 133.2 kg (293 lb 10.4 oz)  SpO2: 95 %      Physical Exam  Vitals and nursing note reviewed.   Constitutional:       General: He is not in acute distress.     Appearance: Normal appearance. He is obese. He is not ill-appearing.   HENT:      Head: Normocephalic and atraumatic.      Right Ear: Tympanic membrane normal.      Left Ear: Tympanic membrane normal.      Nose: Nose normal.      Mouth/Throat:      Mouth: Mucous membranes are dry.      Pharynx: Oropharynx is clear.   Eyes:      Extraocular Movements: Extraocular movements intact.      Conjunctiva/sclera: Conjunctivae normal.   Cardiovascular:      Rate and Rhythm: Normal rate and regular rhythm.      Pulses: Normal pulses.      Heart sounds:  Normal heart sounds.   Pulmonary:      Effort: Pulmonary effort is normal.      Breath sounds: Normal breath sounds.   Abdominal:      General: Bowel sounds are normal.      Palpations: Abdomen is soft.   Musculoskeletal:         General: Normal range of motion.      Cervical back: Normal range of motion and neck supple.   Skin:     General: Skin is warm and dry.      Capillary Refill: Capillary refill takes less than 2 seconds.   Neurological:      General: No focal deficit present.      Mental Status: He is alert and oriented to person, place, and time.   Psychiatric:         Mood and Affect: Mood normal.         Behavior: Behavior normal.         ED Course                 Procedures              Critical Care time:  none               Results for orders placed or performed during the hospital encounter of 03/15/22 (from the past 24 hour(s))   CBC with platelets differential    Narrative    The following orders were created for panel order CBC with platelets differential.  Procedure                               Abnormality         Status                     ---------                               -----------         ------                     CBC with platelets and d...[[id number]]  Abnormal            Final result                 Please view results for these tests on the individual orders.   Comprehensive metabolic panel   Result Value Ref Range    Sodium 142 133 - 144 mmol/L    Potassium 4.3 3.4 - 5.3 mmol/L    Chloride 110 (H) 94 - 109 mmol/L    Carbon Dioxide (CO2) 28 20 - 32 mmol/L    Anion Gap 4 3 - 14 mmol/L    Urea Nitrogen 27 7 - 30 mg/dL    Creatinine 1.33 (H) 0.66 - 1.25 mg/dL    Calcium 10.3 (H) 8.5 - 10.1 mg/dL    Glucose 104 (H) 70 - 99 mg/dL    Alkaline Phosphatase 94 40 - 150 U/L    AST 33 0 - 45 U/L    ALT 38 0 - 70 U/L    Protein Total 7.6 6.8 - 8.8 g/dL    Albumin 3.6 3.4 - 5.0 g/dL    Bilirubin Total 0.6 0.2 - 1.3 mg/dL    GFR Estimate 56 (L) >60 mL/min/1.73m2   Lactic acid whole blood    Result Value Ref Range    Lactic Acid 0.9 0.7 - 2.0 mmol/L   ABO/Rh type and screen *Canceled*    Narrative    The following orders were created for panel order ABO/Rh type and screen.  Procedure                               Abnormality         Status                     ---------                               -----------         ------                     Adult Type and Screen[[id number]]                                                         Please view results for these tests on the individual orders.   CBC with platelets and differential   Result Value Ref Range    WBC Count 8.8 4.0 - 11.0 10e3/uL    RBC Count 4.75 4.40 - 5.90 10e6/uL    Hemoglobin 15.0 13.3 - 17.7 g/dL    Hematocrit 43.5 40.0 - 53.0 %    MCV 92 78 - 100 fL    MCH 31.6 26.5 - 33.0 pg    MCHC 34.5 31.5 - 36.5 g/dL    RDW 13.3 10.0 - 15.0 %    Platelet Count 147 (L) 150 - 450 10e3/uL    % Neutrophils 59 %    % Lymphocytes 24 %    % Monocytes 12 %    % Eosinophils 4 %    % Basophils 1 %    % Immature Granulocytes 0 %    NRBCs per 100 WBC 0 <1 /100    Absolute Neutrophils 5.3 1.6 - 8.3 10e3/uL    Absolute Lymphocytes 2.1 0.8 - 5.3 10e3/uL    Absolute Monocytes 1.0 0.0 - 1.3 10e3/uL    Absolute Eosinophils 0.3 0.0 - 0.7 10e3/uL    Absolute Basophils 0.1 0.0 - 0.2 10e3/uL    Absolute Immature Granulocytes 0.0 <=0.4 10e3/uL    Absolute NRBCs 0.0 10e3/uL   Ionized Calcium   Result Value Ref Range    Calcium Ionized 5.2 4.4 - 5.2 mg/dL   [place] Draw    Narrative    The following orders were created for panel order [place] Draw.  Procedure                               Abnormality         Status                     ---------                               -----------         ------                     Extra Blue Top Tube[[id number]]                              Final result               Extra Red Top Tube[[id number]]                               Final result                 Please view results for these tests on the individual orders.   Extra Blue Top  Tube   Result Value Ref Range    Hold Specimen JIC    Extra Red Top Tube   Result Value Ref Range    Hold Specimen JIC      8:58 PM  When the patient's CBC was reported here I canceled his type and screen and the 2 units of packed cells that I had ordered in anticipation of transfusion based upon the hemoglobin of 5 as an outpatient.  Its not clear to me how the patient's hemoglobin is so low or his platelet count was so low given that he had a phlebotomy for the blood draw.  There were no IV fluids running.  The patient describes the IV blood draw is somewhat odd in that the tech george multiple tubes and then also did some blood to top up the tubes from his fingerstick.  I do not see how this could result in the hemogram abnormalities that he has especially in light of the fact that his CMP shows no findings that would suggest hemolysis.  In any event his results now are normal.  I declined to pursue a rectal exam for this gentleman as I do not feel that it is appropriate or indicated to do so.  I also discussed his presentation with Dr. Gillis who graciously called in to find out how he was doing.  He will follow up in clinic with her as indicated and return to the emergency department as needed.        Medications - No data to display    Assessments & Plan (with Medical Decision Making)   Assessments and plan with medical decision making at the time stamp above.    Disclaimer: This note consists of symbols derived from keyboarding, dictation and/or voice recognition software. As a result, there may be errors in the script that have gone undetected. Please consider this when interpreting information found in this chart.      I have reviewed the nursing notes.    I have reviewed the findings, diagnosis, plan and need for follow up with the patient.            New Prescriptions    No medications on file       Final diagnoses:   Labor abnormality - Erroneous low hemoglobin and platelets from clinic visit        3/15/2022   [facility] EMERGENCY DEPT     Willi Pires MD  03/15/22 2100

## 2022-03-17 PROBLEM — E83.52 HYPERCALCEMIA: Status: ACTIVE | Noted: 2021-04-12

## 2022-03-17 LAB — DEPRECATED CALCIDIOL+CALCIFEROL SERPL-MC: 35 UG/L (ref 20–75)

## 2022-03-17 NOTE — RESULT ENCOUNTER NOTE
The parathyroid hormone and Vitamin D levels are normal.  There is no clear cause for the mildly elevated calcium that has been seen since last year. We will monitor yearly and if the calcium increases, will pursue further work-up at that time.

## 2022-08-10 ASSESSMENT — SLEEP AND FATIGUE QUESTIONNAIRES
HOW LIKELY ARE YOU TO NOD OFF OR FALL ASLEEP WHILE SITTING AND READING: SLIGHT CHANCE OF DOZING
HOW LIKELY ARE YOU TO NOD OFF OR FALL ASLEEP WHILE LYING DOWN TO REST IN THE AFTERNOON WHEN CIRCUMSTANCES PERMIT: MODERATE CHANCE OF DOZING
HOW LIKELY ARE YOU TO NOD OFF OR FALL ASLEEP WHILE SITTING INACTIVE IN A PUBLIC PLACE: WOULD NEVER DOZE
HOW LIKELY ARE YOU TO NOD OFF OR FALL ASLEEP WHILE SITTING AND TALKING TO SOMEONE: WOULD NEVER DOZE
HOW LIKELY ARE YOU TO NOD OFF OR FALL ASLEEP WHILE WATCHING TV: SLIGHT CHANCE OF DOZING
HOW LIKELY ARE YOU TO NOD OFF OR FALL ASLEEP WHEN YOU ARE A PASSENGER IN A CAR FOR AN HOUR WITHOUT A BREAK: WOULD NEVER DOZE
HOW LIKELY ARE YOU TO NOD OFF OR FALL ASLEEP WHILE SITTING QUIETLY AFTER LUNCH WITHOUT ALCOHOL: SLIGHT CHANCE OF DOZING
HOW LIKELY ARE YOU TO NOD OFF OR FALL ASLEEP IN A CAR, WHILE STOPPED FOR A FEW MINUTES IN TRAFFIC: WOULD NEVER DOZE

## 2022-08-16 ENCOUNTER — VIRTUAL VISIT (OUTPATIENT)
Dept: SLEEP MEDICINE | Facility: CLINIC | Age: 72
End: 2022-08-16
Payer: COMMERCIAL

## 2022-08-16 DIAGNOSIS — G47.33 OSA (OBSTRUCTIVE SLEEP APNEA): Primary | ICD-10-CM

## 2022-08-16 PROCEDURE — 99213 OFFICE O/P EST LOW 20 MIN: CPT | Mod: 95 | Performed by: PHYSICIAN ASSISTANT

## 2022-08-16 NOTE — PROGRESS NOTES
Does Wei have a CPAP/Bipap?  Yes     Type of mask: nasal mask    [facility]: [place] [phone]    [email or web address]    Adrian Sleep Scale: 5    Obstructive Sleep Apnea - PAP Follow-Up Visit:    No chief complaint on file.      Wei Kline comes in today for follow-up of their mild sleep apnea, managed with CPAP.     No specialty comments available.    Overall, he rates the experience with PAP as 10 (0 poor, 10 great). The mask is comfortable.  The mask is not leaking .  He is not snoring with the mask on. He is not having gasp arousals.  He is not having significant oral/nasal dryness. The pressure is comfortable.    His PAP interface is Nasal Mask.    Bedtime is typically 10-11. Usually it takes about 10 minutes to fall asleep with the mask on. Wake time is typically 8.  Patient is using PAP therapy 9 hours per night. The patient is usually getting 9 hours of sleep per night.    He does feel rested in the morning.    Adrian Sleepiness Scale: 5/24      No data recorded    Respironics    Auto-PAP 9 - 14 cmH2O 30 day usage data:    96.7% of days with > 4 hours of use. 1/30 days with no use.   Average use 9 hours 8 minutes per day.   Average leak 0 LPM.  Average % of night in large leak 0%.    CPAP 90% pressure 10.7cm.   AHI 3 events per hour.          Past medical/surgical history, family history, social history, medications and allergies were reviewed.      Problem List:  Patient Active Problem List    Diagnosis Date Noted     Hypercalcemia 04/12/2021     Priority: Medium     The parathyroid hormone and Vitamin D levels are normal.  There is no clear cause for the mildly elevated calcium that has been seen since last year 2021. We will monitor yearly and if the calcium increases, will pursue further work-up at that time.       Morbid obesity (H) 03/22/2019     Priority: Medium     Hyperglycemia 03/22/2019     Priority: Medium     Umbilical hernia without  obstruction and without gangrene 03/22/2019     Priority: Medium     Kidney stones 02/04/2015     Priority: Medium     Idiopathic chronic gout of multiple sites without tophus 01/16/2015     Priority: Medium     Diagnosed in 2009 at [facility], no flares since starting allopurinol.         Acquired hypothyroidism 01/16/2015     Priority: Medium     Hyperlipidemia with target LDL less than 100 01/16/2015     Priority: Medium     Hypertension goal BP (blood pressure) < 140/90 01/16/2015     Priority: Medium     History of cardiomyopathy 01/16/2015     Priority: Medium     Had angiogram in 2002- clean arteries.  Thought to be viral.  Mild decrease in EF initially but last echo in 2014 was normal.       LILLY (obstructive sleep apnea) 01/16/2015     Priority: Medium     PSG performed 5/10/2010 at [facility] with weight 256 lbs, AHI 9.9, mean SpO2 91.9%, katlin SpO2 87%.  CPAP 9 cm H2O appeared effective, though no supine REM observed.  Frequent PVC's mentioned.          There were no vitals taken for this visit.    Impression/Plan:     Mild Sleep apnea. He is Tolerating PAP well, apnea is well controlled. Daytime symptoms are stable.   Supplies re ordered    Wei Kline will follow up in about 1 year(s).     Fifteen minutes spent with patient, all of which were spent face-to-face counseling, consulting, coordinating plan of care.      CC:  Mini Gillis,

## 2022-08-23 ENCOUNTER — OFFICE VISIT (OUTPATIENT)
Dept: UROLOGY | Facility: CLINIC | Age: 72
End: 2022-08-23
Payer: COMMERCIAL

## 2022-08-23 VITALS — SYSTOLIC BLOOD PRESSURE: 131 MMHG | HEART RATE: 66 BPM | OXYGEN SATURATION: 95 % | DIASTOLIC BLOOD PRESSURE: 89 MMHG

## 2022-08-23 DIAGNOSIS — N52.9 VASCULOGENIC ERECTILE DYSFUNCTION, UNSPECIFIED VASCULOGENIC ERECTILE DYSFUNCTION TYPE: Primary | ICD-10-CM

## 2022-08-23 PROCEDURE — 36415 COLL VENOUS BLD VENIPUNCTURE: CPT | Performed by: STUDENT IN AN ORGANIZED HEALTH CARE EDUCATION/TRAINING PROGRAM

## 2022-08-23 PROCEDURE — 84403 ASSAY OF TOTAL TESTOSTERONE: CPT | Performed by: STUDENT IN AN ORGANIZED HEALTH CARE EDUCATION/TRAINING PROGRAM

## 2022-08-23 PROCEDURE — 99203 OFFICE O/P NEW LOW 30 MIN: CPT | Performed by: STUDENT IN AN ORGANIZED HEALTH CARE EDUCATION/TRAINING PROGRAM

## 2022-08-23 RX ORDER — SILDENAFIL CITRATE 20 MG/1
TABLET ORAL
Qty: 90 TABLET | Refills: 11 | Status: SHIPPED | OUTPATIENT
Start: 2022-08-23 | End: 2022-08-29

## 2022-08-23 NOTE — PROGRESS NOTES
Chief Complaint:   Erectile dysfunction          History of Present Illness:   Wei Kline is a 72 year old male with a history of kidney stones, hypothyroidism, LILLY, HTN, HLD, and hyperglycemia with complaints of erectile dysfunction for the last few months. He primarily has difficulty maintaining an erection.      ED/Vascular disease risk factors:  HTN: yes, on metoprolol and telmisartan  Hyperlipidemia: yes, on atorvastatin  DM: no, but hyperglycemia  Cardiovascular disease: None known  LILLY: yes         Past Medical History:     Past Medical History:   Diagnosis Date     Gout, unspecified 01/16/2015    Diagnosed in 2009 at [facility], no flares since starting allopurinol.     History of cardiomyopathy 01/16/2015    Had angiogram in 2002- clean arteries.  Thought to be viral.  Mild decrease in EF initially but last echo in 2014 was normal.     History of Lyme disease 01/16/2015     Hyperlipidemia LDL goal < 100 01/16/2015     Hypertension goal BP (blood pressure) < 140/90 01/16/2015     Hypothyroidism 01/16/2015     Kidney stone      Morbid obesity (H)      LILLY (obstructive sleep apnea)      Umbilical hernia without obstruction and without gangrene             Past Surgical History:     Past Surgical History:   Procedure Laterality Date     arthroscopic knee surgery  1990's    bilateral knees (no replacement)     ARTHROSCOPY ANKLE, OPEN REPAIR LIGAMENT, COMBINED Left 12/26/2019    Procedure: Left Ankle: arthroscopy - eval and debridement; cartilage repair procedures; lateral ankle ligament repair; gastroc recession;  Surgeon: Sandro Castro DPM;  Location: [facility]     COLONOSCOPY N/A 6/30/2020    Procedure: COLONOSCOPY, WITH POLYPECTOMY AND BIOPSY;  Surgeon: Wes Dowd MD;  Location: [facility]     TONSILLECTOMY              Medications     Current Outpatient Medications   Medication     allopurinol (ZYLOPRIM) 300 MG tablet     atorvastatin (LIPITOR) 20 MG tablet     fluticasone (FLONASE) 50 MCG/ACT  nasal spray     FOLIC ACID PO     levothyroxine (SYNTHROID/LEVOTHROID) 137 MCG tablet     metoprolol tartrate (LOPRESSOR) 25 MG tablet     order for DME     telmisartan (MICARDIS) 80 MG tablet     vitamin C (ASCORBIC ACID) 1000 MG TABS     Vitamin D, Cholecalciferol, 25 MCG (1000 UT) TABS     No current facility-administered medications for this visit.            Allergies:   Nka [no known allergies]         Review of Systems:  From intake questionnaire   Negative 14 system review except as noted on HPI, nurse's note.         Physical Exam:   Patient is a 72 year old  male   Vitals: Blood pressure 131/89, pulse 66, SpO2 95 %.  General Appearance Adult: Alert, no acute distress, oriented.  Lungs: Non-labored breathing.  Heart: No obvious jugular venous distension present.  Neuro: Alert, oriented, speech and mentation normal      Labs and Pathology:    I personally reviewed all applicable laboratory data and went over findings with patient  Significant for:    BMP RESULTS:  Recent Labs   Lab Test 03/15/22  1921 03/15/22  1449 04/12/21  0920 03/01/21  1039 12/09/19  1011 03/22/19  0903    140 137 138 137 140   POTASSIUM 4.3 4.7 4.7 4.3 4.5 4.4   CHLORIDE 110* 108 107 107 108 109   CO2 28 27 27 28 26 27   ANIONGAP 4 5 3 3 3 4   * 87 101* 92 87 94   BUN 27 26 26 22 29 23   CR 1.33* 1.30* 1.31* 1.24 1.19 1.18   GFRESTIMATED 56* 58* 54* 58* 61 62   GFRESTBLACK  --   --  63 67 71 72   FLAVIA 10.3* 10.5* 9.7 10.3* 9.8 9.4       PSA RESULTS  PSA   Date Value Ref Range Status   03/01/2021 1.30 0 - 4 ug/L Final     Comment:     Assay Method:  Chemiluminescence using Siemens Vista analyzer   03/03/2017 0.89 0 - 4 ug/L Final     Comment:     Assay Method:  Chemiluminescence using Siemens Vista analyzer     Prostate Specific Antigen Screen   Date Value Ref Range Status   03/15/2022 1.38 0.00 - 4.00 ug/L Final            Assessment and Plan:     Assessment: 72 year old male with a several month history of difficulty  maintaining an erection. We discussed the etiology of erectile dysfunction; the patient has diagnoses of HTN, HLD, and LILLY.     We discussed treatment of erectile dysfunction with oral PDE-5 inhibitors, a vacuum erection device, intra cavernosal injections, and penile prosthesis surgery. The patient elected to pursue treatment with sildenafil. Side effects and proper usage were reviewed.     Plan:  1. Sildenafil 20 mg-take 1-5 tablets daily as needed, 30-45 minutes prior to intended sexual activity.   2. Check testosterone to complete laboratory evaluation.   3. Follow up as needed.     LEXIE TARANGO PA-C  Department of Urology

## 2022-08-23 NOTE — NURSING NOTE
"Initial /89 (BP Location: Left arm, Patient Position: Chair, Cuff Size: Adult Large)   Pulse 66   SpO2 95%  Estimated body mass index is 42.28 kg/m  as calculated from the following:    Height as of 3/15/22: 1.775 m (5' 9.88\").    Weight as of 3/15/22: 133.2 kg (293 lb 10.4 oz). .    Sarahi Basilio MA on 8/23/2022 at 7:59 AM  "

## 2022-08-26 ENCOUNTER — TELEPHONE (OUTPATIENT)
Dept: UROLOGY | Facility: CLINIC | Age: 72
End: 2022-08-26

## 2022-08-26 LAB — TESTOST SERPL-MCNC: 359 NG/DL (ref 240–950)

## 2022-08-29 DIAGNOSIS — N52.9 VASCULOGENIC ERECTILE DYSFUNCTION, UNSPECIFIED VASCULOGENIC ERECTILE DYSFUNCTION TYPE: ICD-10-CM

## 2022-08-29 RX ORDER — SILDENAFIL CITRATE 20 MG/1
TABLET ORAL
Qty: 90 TABLET | Refills: 11 | Status: SHIPPED | OUTPATIENT
Start: 2022-08-29 | End: 2023-03-21

## 2022-11-19 ENCOUNTER — HEALTH MAINTENANCE LETTER (OUTPATIENT)
Age: 72
End: 2022-11-19

## 2023-03-19 ASSESSMENT — ENCOUNTER SYMPTOMS
FEVER: 0
PALPITATIONS: 0
CHILLS: 0
PARESTHESIAS: 0
NERVOUS/ANXIOUS: 0
WEAKNESS: 0
COUGH: 0
HEMATURIA: 0
ARTHRALGIAS: 1
DIARRHEA: 0
EYE PAIN: 0
HEADACHES: 0
SHORTNESS OF BREATH: 0
DYSURIA: 0
HEMATOCHEZIA: 0
NAUSEA: 0
CONSTIPATION: 0
DIZZINESS: 0
FREQUENCY: 0
SORE THROAT: 0
ABDOMINAL PAIN: 0
JOINT SWELLING: 1
MYALGIAS: 0
HEARTBURN: 0

## 2023-03-19 ASSESSMENT — ACTIVITIES OF DAILY LIVING (ADL): CURRENT_FUNCTION: NO ASSISTANCE NEEDED

## 2023-03-21 ENCOUNTER — OFFICE VISIT (OUTPATIENT)
Dept: FAMILY MEDICINE | Facility: CLINIC | Age: 73
End: 2023-03-21
Payer: COMMERCIAL

## 2023-03-21 VITALS
TEMPERATURE: 97.1 F | OXYGEN SATURATION: 94 % | BODY MASS INDEX: 40.6 KG/M2 | WEIGHT: 290 LBS | RESPIRATION RATE: 16 BRPM | HEART RATE: 65 BPM | HEIGHT: 71 IN | SYSTOLIC BLOOD PRESSURE: 134 MMHG | DIASTOLIC BLOOD PRESSURE: 82 MMHG

## 2023-03-21 DIAGNOSIS — M1A.09X0 IDIOPATHIC CHRONIC GOUT OF MULTIPLE SITES WITHOUT TOPHUS: ICD-10-CM

## 2023-03-21 DIAGNOSIS — R73.9 HYPERGLYCEMIA: ICD-10-CM

## 2023-03-21 DIAGNOSIS — E66.01 MORBID OBESITY (H): ICD-10-CM

## 2023-03-21 DIAGNOSIS — Z13.220 SCREENING FOR HYPERLIPIDEMIA: ICD-10-CM

## 2023-03-21 DIAGNOSIS — Z12.5 SCREENING FOR PROSTATE CANCER: ICD-10-CM

## 2023-03-21 DIAGNOSIS — M10.9 GOUT, UNSPECIFIED CAUSE, UNSPECIFIED CHRONICITY, UNSPECIFIED SITE: ICD-10-CM

## 2023-03-21 DIAGNOSIS — E03.9 ACQUIRED HYPOTHYROIDISM: ICD-10-CM

## 2023-03-21 DIAGNOSIS — N52.9 VASCULOGENIC ERECTILE DYSFUNCTION, UNSPECIFIED VASCULOGENIC ERECTILE DYSFUNCTION TYPE: ICD-10-CM

## 2023-03-21 DIAGNOSIS — E78.5 HYPERLIPIDEMIA WITH TARGET LDL LESS THAN 100: ICD-10-CM

## 2023-03-21 DIAGNOSIS — I10 HYPERTENSION GOAL BP (BLOOD PRESSURE) < 140/90: ICD-10-CM

## 2023-03-21 DIAGNOSIS — E83.52 HYPERCALCEMIA: ICD-10-CM

## 2023-03-21 DIAGNOSIS — N18.31 CHRONIC KIDNEY DISEASE, STAGE 3A (H): ICD-10-CM

## 2023-03-21 DIAGNOSIS — Z00.00 ENCOUNTER FOR MEDICARE ANNUAL WELLNESS EXAM: Primary | ICD-10-CM

## 2023-03-21 LAB
ALBUMIN SERPL BCG-MCNC: 4.1 G/DL (ref 3.5–5.2)
ALP SERPL-CCNC: 95 U/L (ref 40–129)
ALT SERPL W P-5'-P-CCNC: 29 U/L (ref 10–50)
ANION GAP SERPL CALCULATED.3IONS-SCNC: 11 MMOL/L (ref 7–15)
AST SERPL W P-5'-P-CCNC: 39 U/L (ref 10–50)
BILIRUB SERPL-MCNC: 0.4 MG/DL
BUN SERPL-MCNC: 25.5 MG/DL (ref 8–23)
CALCIUM SERPL-MCNC: 10.3 MG/DL (ref 8.8–10.2)
CHLORIDE SERPL-SCNC: 107 MMOL/L (ref 98–107)
CHOLEST SERPL-MCNC: 150 MG/DL
CREAT SERPL-MCNC: 1.33 MG/DL (ref 0.67–1.17)
CREAT UR-MCNC: 75.5 MG/DL
DEPRECATED HCO3 PLAS-SCNC: 22 MMOL/L (ref 22–29)
ERYTHROCYTE [DISTWIDTH] IN BLOOD BY AUTOMATED COUNT: 13.4 % (ref 10–15)
GFR SERPL CREATININE-BSD FRML MDRD: 56 ML/MIN/1.73M2
GLUCOSE SERPL-MCNC: 85 MG/DL (ref 70–99)
HBA1C MFR BLD: 5.4 % (ref 0–5.6)
HCT VFR BLD AUTO: 45.4 % (ref 40–53)
HDLC SERPL-MCNC: 60 MG/DL
HGB BLD-MCNC: 14.9 G/DL (ref 13.3–17.7)
LDLC SERPL CALC-MCNC: 72 MG/DL
MCH RBC QN AUTO: 30.9 PG (ref 26.5–33)
MCHC RBC AUTO-ENTMCNC: 32.8 G/DL (ref 31.5–36.5)
MCV RBC AUTO: 94 FL (ref 78–100)
MICROALBUMIN UR-MCNC: 16.9 MG/L
MICROALBUMIN/CREAT UR: 22.38 MG/G CR (ref 0–17)
NONHDLC SERPL-MCNC: 90 MG/DL
PLATELET # BLD AUTO: 145 10E3/UL (ref 150–450)
POTASSIUM SERPL-SCNC: 4.7 MMOL/L (ref 3.4–5.3)
PROT SERPL-MCNC: 7.5 G/DL (ref 6.4–8.3)
PSA SERPL DL<=0.01 NG/ML-MCNC: 1.48 NG/ML (ref 0–6.5)
PTH-INTACT SERPL-MCNC: 65 PG/ML (ref 15–65)
RBC # BLD AUTO: 4.82 10E6/UL (ref 4.4–5.9)
SODIUM SERPL-SCNC: 140 MMOL/L (ref 136–145)
TRIGL SERPL-MCNC: 92 MG/DL
TSH SERPL DL<=0.005 MIU/L-ACNC: 1.2 UIU/ML (ref 0.3–4.2)
URATE SERPL-MCNC: 6.3 MG/DL (ref 3.4–7)
WBC # BLD AUTO: 8.3 10E3/UL (ref 4–11)

## 2023-03-21 PROCEDURE — 84550 ASSAY OF BLOOD/URIC ACID: CPT | Performed by: INTERNAL MEDICINE

## 2023-03-21 PROCEDURE — 36415 COLL VENOUS BLD VENIPUNCTURE: CPT | Performed by: INTERNAL MEDICINE

## 2023-03-21 PROCEDURE — 82306 VITAMIN D 25 HYDROXY: CPT | Performed by: INTERNAL MEDICINE

## 2023-03-21 PROCEDURE — G0103 PSA SCREENING: HCPCS | Performed by: INTERNAL MEDICINE

## 2023-03-21 PROCEDURE — 80053 COMPREHEN METABOLIC PANEL: CPT | Performed by: INTERNAL MEDICINE

## 2023-03-21 PROCEDURE — 99214 OFFICE O/P EST MOD 30 MIN: CPT | Mod: 25 | Performed by: INTERNAL MEDICINE

## 2023-03-21 PROCEDURE — 82570 ASSAY OF URINE CREATININE: CPT | Performed by: INTERNAL MEDICINE

## 2023-03-21 PROCEDURE — 83036 HEMOGLOBIN GLYCOSYLATED A1C: CPT | Performed by: INTERNAL MEDICINE

## 2023-03-21 PROCEDURE — 84443 ASSAY THYROID STIM HORMONE: CPT | Performed by: INTERNAL MEDICINE

## 2023-03-21 PROCEDURE — 83970 ASSAY OF PARATHORMONE: CPT | Performed by: INTERNAL MEDICINE

## 2023-03-21 PROCEDURE — 85027 COMPLETE CBC AUTOMATED: CPT | Performed by: INTERNAL MEDICINE

## 2023-03-21 PROCEDURE — 82043 UR ALBUMIN QUANTITATIVE: CPT | Performed by: INTERNAL MEDICINE

## 2023-03-21 PROCEDURE — G0439 PPPS, SUBSEQ VISIT: HCPCS | Performed by: INTERNAL MEDICINE

## 2023-03-21 PROCEDURE — 80061 LIPID PANEL: CPT | Performed by: INTERNAL MEDICINE

## 2023-03-21 RX ORDER — ALLOPURINOL 300 MG/1
300 TABLET ORAL DAILY
Qty: 90 TABLET | Refills: 4 | Status: SHIPPED | OUTPATIENT
Start: 2023-03-21 | End: 2024-04-05

## 2023-03-21 RX ORDER — METOPROLOL TARTRATE 25 MG/1
12.5 TABLET, FILM COATED ORAL 2 TIMES DAILY
Qty: 90 TABLET | Refills: 4 | Status: SHIPPED | OUTPATIENT
Start: 2023-03-21 | End: 2024-04-05

## 2023-03-21 RX ORDER — LEVOTHYROXINE SODIUM 137 UG/1
TABLET ORAL
Qty: 90 TABLET | Refills: 4 | Status: SHIPPED | OUTPATIENT
Start: 2023-03-21 | End: 2024-04-05

## 2023-03-21 RX ORDER — TELMISARTAN 80 MG/1
80 TABLET ORAL DAILY
Qty: 90 TABLET | Refills: 3 | Status: SHIPPED | OUTPATIENT
Start: 2023-03-21 | End: 2024-04-05

## 2023-03-21 RX ORDER — SILDENAFIL CITRATE 20 MG/1
TABLET ORAL
Qty: 90 TABLET | Refills: 11 | Status: SHIPPED | OUTPATIENT
Start: 2023-03-21 | End: 2023-10-09

## 2023-03-21 RX ORDER — ATORVASTATIN CALCIUM 20 MG/1
20 TABLET, FILM COATED ORAL DAILY
Qty: 90 TABLET | Refills: 3 | Status: SHIPPED | OUTPATIENT
Start: 2023-03-21 | End: 2024-04-05

## 2023-03-21 ASSESSMENT — ENCOUNTER SYMPTOMS
SORE THROAT: 0
DIZZINESS: 0
HEADACHES: 0
PARESTHESIAS: 0
WEAKNESS: 0
HEMATURIA: 0
JOINT SWELLING: 1
DIARRHEA: 0
ARTHRALGIAS: 1
CHILLS: 0
NAUSEA: 0
EYE PAIN: 0
MYALGIAS: 0
COUGH: 0
FEVER: 0
HEARTBURN: 0
SHORTNESS OF BREATH: 0
DYSURIA: 0
ABDOMINAL PAIN: 0
CONSTIPATION: 0
PALPITATIONS: 0
HEMATOCHEZIA: 0
FREQUENCY: 0
NERVOUS/ANXIOUS: 0

## 2023-03-21 ASSESSMENT — PAIN SCALES - GENERAL: PAINLEVEL: NO PAIN (0)

## 2023-03-21 ASSESSMENT — ACTIVITIES OF DAILY LIVING (ADL): CURRENT_FUNCTION: NO ASSISTANCE NEEDED

## 2023-03-21 NOTE — LETTER
March 27, 2023      Wei Kline  [address] [place] [id number]        Dear ,    We are writing to inform you of your test results.    All lab tests are similar/stable compared to 1 year ago.  No changes to care plan. Vitamin D is also Normal.    Resulted Orders   TSH WITH FREE T4 REFLEX   Result Value Ref Range    TSH 1.20 0.30 - 4.20 uIU/mL   Uric acid   Result Value Ref Range    Uric Acid 6.3 3.4 - 7.0 mg/dL   Comprehensive metabolic panel (BMP + Alb, Alk Phos, ALT, AST, Total. Bili, TP)   Result Value Ref Range    Sodium 140 136 - 145 mmol/L    Potassium 4.7 3.4 - 5.3 mmol/L    Chloride 107 98 - 107 mmol/L    Carbon Dioxide (CO2) 22 22 - 29 mmol/L    Anion Gap 11 7 - 15 mmol/L    Urea Nitrogen 25.5 (H) 8.0 - 23.0 mg/dL    Creatinine 1.33 (H) 0.67 - 1.17 mg/dL    Calcium 10.3 (H) 8.8 - 10.2 mg/dL    Glucose 85 70 - 99 mg/dL    Alkaline Phosphatase 95 40 - 129 U/L    AST 39 10 - 50 U/L    ALT 29 10 - 50 U/L    Protein Total 7.5 6.4 - 8.3 g/dL    Albumin 4.1 3.5 - 5.2 g/dL    Bilirubin Total 0.4 <=1.2 mg/dL    GFR Estimate 56 (L) >60 mL/min/1.73m2      Comment:      eGFR calculated using 2021 CKD-EPI equation.   CBC with platelets   Result Value Ref Range    WBC Count 8.3 4.0 - 11.0 10e3/uL    RBC Count 4.82 4.40 - 5.90 10e6/uL    Hemoglobin 14.9 13.3 - 17.7 g/dL    Hematocrit 45.4 40.0 - 53.0 %    MCV 94 78 - 100 fL    MCH 30.9 26.5 - 33.0 pg    MCHC 32.8 31.5 - 36.5 g/dL    RDW 13.4 10.0 - 15.0 %    Platelet Count 145 (L) 150 - 450 10e3/uL   Parathyroid Hormone Intact   Result Value Ref Range    Parathyroid Hormone Intact 65 15 - 65 pg/mL    Narrative    This result was obtained with the Roche Elecsys PTH STAT assay.   This reference range differs from PTH assays used in other [facility] laboratories.   Hemoglobin A1c   Result Value Ref Range    Hemoglobin A1C 5.4 0.0 - 5.6 %      Comment:      Normal <5.7%   Prediabetes 5.7-6.4%    Diabetes 6.5% or higher     Note: Adopted from ADA  consensus guidelines.   Albumin Random Urine Quantitative with Creat Ratio   Result Value Ref Range    Creatinine Urine mg/dL 75.5 mg/dL      Comment:      The reference ranges have not been established in urine creatinine. The results should be integrated into the clinical context for interpretation.    Albumin Urine mg/L 16.9 mg/L      Comment:      The reference ranges have not been established in urine albumin. The results should be integrated into the clinical context for interpretation.    Albumin Urine mg/g Cr 22.38 (H) 0.00 - 17.00 mg/g Cr      Comment:      Microalbuminuria is defined as an albumin:creatinine ratio of 17 to 299 for males and 25 to 299 for females. A ratio of albumin:creatinine of 300 or higher is indicative of overt proteinuria.  Due to biologic variability, positive results should be confirmed by a second, first-morning random or 24-hour timed urine specimen. If there is discrepancy, a third specimen is recommended. When 2 out of 3 results are in the microalbuminuria range, this is evidence for incipient nephropathy and warrants increased efforts at glucose control, blood pressure control, and institution of therapy with an angiotensin-converting-enzyme (ACE) inhibitor (if the patient can tolerate it).     PSA, screen   Result Value Ref Range    Prostate Specific Antigen Screen 1.48 0.00 - 6.50 ng/mL    Narrative    This result is obtained using the Roche Elecsys total PSA method on the ynes [id number] immunoassay analyzer. Results obtained with different assay methods or kits cannot be used interchangeably.   Vitamin D Deficiency   Result Value Ref Range    Vitamin D, Total (25-Hydroxy) 30 20 - 75 ug/L    Narrative    Season, race, dietary intake, and treatment affect the concentration of 25-hydroxy-Vitamin D. Values may decrease during winter months and increase during summer months. Values 20-29 ug/L may indicate Vitamin D insufficiency and values <20 ug/L may indicate Vitamin D  deficiency.    Vitamin D determination is routinely performed by an immunoassay specific for 25 hydroxyvitamin D3.  If an individual is on vitamin D2(ergocalciferol) supplementation, please specify 25 OH vitamin D2 and D3 level determination by LCMSMS test [id number].         If you have any questions or concerns, please call the clinic at the number listed above.       Sincerely,      Mini Gillis, DO

## 2023-03-21 NOTE — PROGRESS NOTES
"SUBJECTIVE:   Wei is a 73 year old who presents for Preventive Visit.  Patient has been advised of split billing requirements and indicates understanding: Yes  Are you in the first 12 months of your Medicare coverage?  No    Thyroid Disease  Associated symptoms include arthralgias and joint swelling. Pertinent negatives include no abdominal pain, chest pain, chills, congestion, coughing, fever, headaches, myalgias, nausea, rash, sore throat or weakness.   Healthy Habits:     In general, how would you rate your overall health?  Good    Frequency of exercise:  6-7 days/week    Duration of exercise:  15-30 minutes    Do you usually eat at least 4 servings of fruit and vegetables a day, include whole grains    & fiber and avoid regularly eating high fat or \"junk\" foods?  Yes    Taking medications regularly:  Yes    Medication side effects:  None    Ability to successfully perform activities of daily living:  No assistance needed    Home Safety:  No safety concerns identified    Hearing Impairment:  Need to ask people to speak up or repeat themselves    In the past 6 months, have you been bothered by leaking of urine?  No    In general, how would you rate your overall mental or emotional health?  Good      PHQ-2 Total Score: 0    Additional concerns today:  No        Chief Complaint   Patient presents with     medicare wellness      Lipids     Hypertension     Thyroid Disease       Have you ever done Advance Care Planning? (For example, a Health Directive, POLST, or a discussion with a medical provider or your loved ones about your wishes): Yes, patient states has an Advance Care Planning document and will bring a copy to the clinic.       Fall risk  Fallen 2 or more times in the past year?: No  Any fall with injury in the past year?: No    Cognitive Screening   1) Repeat 3 items (Leader, Season, Table)    2) Clock draw: NORMAL  3) 3 item recall: Recalls 3 objects  Results: 3 items recalled: COGNITIVE IMPAIRMENT LESS " LIKELY    Mini-CogTM Copyright GORGE Lawson. Licensed by the author for use in [facility]; reprinted with permission (robb@[facility]). All rights reserved.      Do you have sleep apnea, excessive snoring or daytime drowsiness?: yes sleep apnea have CPAP    Reviewed and updated as needed this visit by clinical staff   Tobacco  Allergies  Meds  Problems             Reviewed and updated as needed this visit by Provider     Meds  Problems            Social History     Tobacco Use     Smoking status: Former     Packs/day: 0.50     Years: 5.00     Pack years: 2.50     Types: Cigarettes     Start date: 1955     Quit date: 1960     Years since quittin.2     Smokeless tobacco: Never   Substance Use Topics     Alcohol use: Yes     Comment: occasional         Alcohol Use 3/19/2023   Prescreen: >3 drinks/day or >7 drinks/week? No   No flowsheet data found.    Weight;  --he doesn't want to discuss today    Hyperlipidemia Follow-Up      Are you regularly taking any medication or supplement to lower your cholesterol?   Yes- AM    Are you having muscle aches or other side effects that you think could be caused by your cholesterol lowering medication?  No    Hypertension Follow-up      Do you check your blood pressure regularly outside of the clinic? No     Are you following a low salt diet? No    Are your blood pressures ever more than 140 on the top number (systolic) OR more   than 90 on the bottom number (diastolic), for example 140/90? No    Hypothyroidism Follow-up      Since last visit, patient describes the following symptoms: Weight stable, no hair loss, no skin changes, no constipation, no loose stools      Current providers sharing in care for this patient include:   Patient Care Team:  Mini Gillis DO as PCP - General (Internal Medicine)  Mini Gillis DO as Assigned PCP  Althea Guerra PA-C as Physician Assistant (Urology)  Althea Guerra PA-C as Assigned Surgical  Provider  Dashawn Chadwick PA-C as Assigned Sleep Provider    The following health maintenance items are reviewed in Epic and correct as of today:  Health Maintenance   Topic Date Due     LIPID  03/15/2023     ANNUAL REVIEW OF HM ORDERS  03/15/2023     CBC  03/15/2023     TSH W/FREE T4 REFLEX  03/15/2023     URIC ACID  03/15/2023     COLORECTAL CANCER SCREENING  06/30/2023     MEDICARE ANNUAL WELLNESS VISIT  03/21/2024     FALL RISK ASSESSMENT  03/21/2024     ADVANCE CARE PLANNING  03/21/2028     DTAP/TDAP/TD IMMUNIZATION (3 - Td or Tdap) 12/09/2029     PHQ-2 (once per calendar year)  Completed     INFLUENZA VACCINE  Completed     Pneumococcal Vaccine: 65+ Years  Completed     ZOSTER IMMUNIZATION  Completed     AORTIC ANEURYSM SCREENING (SYSTEM ASSIGNED)  Completed     COVID-19 Vaccine  Completed     HEPATITIS C SCREENING  Addressed     IPV IMMUNIZATION  Aged Out     MENINGITIS IMMUNIZATION  Aged Out     Current Outpatient Medications   Medication Sig Dispense Refill     allopurinol (ZYLOPRIM) 300 MG tablet Take 1 tablet (300 mg) by mouth daily 90 tablet 4     atorvastatin (LIPITOR) 20 MG tablet Take 1 tablet (20 mg) by mouth daily 90 tablet 3     levothyroxine (SYNTHROID/LEVOTHROID) 137 MCG tablet TAKE 1 TABLET (137 MCG) BY MOUTH DAILY 90 tablet 4     metoprolol tartrate (LOPRESSOR) 25 MG tablet Take 0.5 tablets (12.5 mg) by mouth 2 times daily 90 tablet 4     order for DME Equipment being ordered: Cpap, mask, tubing, and all associated supplies 1 each 0     sildenafil (REVATIO) 20 MG tablet Take 1-5 tablets daily as needed, 30-45 minutes prior to sexual activity 90 tablet 11     telmisartan (MICARDIS) 80 MG tablet Take 1 tablet (80 mg) by mouth daily 90 tablet 3     vitamin C (ASCORBIC ACID) 1000 MG TABS Take 1,000 mg by mouth daily           Review of Systems   Constitutional: Negative for chills and fever.   HENT: Positive for hearing loss. Negative for congestion, ear pain and sore throat.    Eyes:  "Negative for pain and visual disturbance.   Respiratory: Negative for cough and shortness of breath.    Cardiovascular: Negative for chest pain, palpitations and peripheral edema.   Gastrointestinal: Negative for abdominal pain, constipation, diarrhea, heartburn, hematochezia and nausea.   Genitourinary: Positive for impotence. Negative for dysuria, frequency, genital sores, hematuria, penile discharge and urgency.   Musculoskeletal: Positive for arthralgias and joint swelling. Negative for myalgias.   Skin: Negative for rash.   Neurological: Negative for dizziness, weakness, headaches and paresthesias.   Psychiatric/Behavioral: Negative for mood changes. The patient is not nervous/anxious.          OBJECTIVE:   /82 (BP Location: Right arm, Patient Position: Sitting, Cuff Size: Adult Large)   Pulse 65   Temp 97.1  F (36.2  C) (Tympanic)   Resp 16   Ht 1.798 m (5' 10.79\")   Wt 131.5 kg (290 lb)   SpO2 94%   BMI 40.69 kg/m   Estimated body mass index is 40.69 kg/m  as calculated from the following:    Height as of this encounter: 1.798 m (5' 10.79\").    Weight as of this encounter: 131.5 kg (290 lb).  Physical Exam  GENERAL: healthy, alert, no distress and obese  EYES: Eyes grossly normal to inspection, PERRL and conjunctivae and sclerae normal  HENT: ear canals and TM's normal, nose and mouth without ulcers or lesions  NECK: no adenopathy, no asymmetry, masses, or scars and thyroid normal to palpation  RESP: lungs clear to auscultation - no rales, rhonchi or wheezes  CV: regular rate and rhythm, normal S1 S2, no S3 or S4, no murmur, click or rub, no peripheral edema and peripheral pulses strong  ABDOMEN: soft, nontender, without hepatosplenomegaly or masses, bowel sounds normal and obese  MS: no gross musculoskeletal defects noted, no edema  SKIN: no suspicious lesions or rashes  NEURO: Normal strength and tone, mentation intact and speech normal  PSYCH: mentation appears normal, affect " normal/bright        ASSESSMENT / PLAN:   (Z00.00) Encounter for Medicare annual wellness exam  (primary encounter diagnosis)  Comment:   Plan:     (M10.9) Gout, unspecified cause, unspecified chronicity, unspecified site  Comment: no flares. Continue med.  Check labs  Plan: allopurinol (ZYLOPRIM) 300 MG tablet, Uric         acid, CBC with platelets, OFFICE/OUTPT         VISIT,EST,LEVL IV            (E78.5) Hyperlipidemia with target LDL less than 100  Comment:  - stable, refill provided  Plan: Lipid panel reflex to direct LDL Non-fasting,         atorvastatin (LIPITOR) 20 MG tablet, Lipid         panel reflex to direct LDL Fasting,         OFFICE/OUTPT VISIT,EST,LEVL IV            (E03.9) Acquired hypothyroidism  Comment:  - stable, refill provided  Plan: TSH WITH FREE T4 REFLEX, levothyroxine         (SYNTHROID/LEVOTHROID) 137 MCG tablet,         OFFICE/OUTPT VISIT,EST,LEVL IV            (I10) Hypertension goal BP (blood pressure) < 140/90  Comment:  - stable, refill provided  Plan: metoprolol tartrate (LOPRESSOR) 25 MG tablet,         telmisartan (MICARDIS) 80 MG tablet,         OFFICE/OUTPT VISIT,EST,LEVL IV            (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  Comment: due fo rlab  Plan: Uric acid, OFFICE/OUTPT VISIT,EST,LEVL IV           (N52.9) Vasculogenic erectile dysfunction, unspecified vasculogenic erectile dysfunction type  Comment:  - stable, refill provided  Plan: sildenafil (REVATIO) 20 MG tablet, OFFICE/OUTPT        VISIT,EST,LEVL IV           (E83.52) Hypercalcemia  Comment: unclear cause; recheck  Plan: Parathyroid Hormone Intact, OFFICE/OUTPT         VISIT,EST,LEVL IV, Vitamin D Deficiency            (R73.9) Hyperglycemia  Comment: check lab  Plan: Hemoglobin A1c, OFFICE/OUTPT VISIT,EST,LEVL IV            (E66.01) Morbid obesity (H)  Comment: Patient is not interested in lifestyle changes or prescription medications for weight management  Plan: OFFICE/OUTPT  VISIT,EST,LEVL IV            (N18.31) Chronic kidney disease, stage 3a (H)  Comment: Stable since 2015, no referral to nephrology needed unless he has significant proteinuria or kidney function declines further  Plan: Comprehensive metabolic panel (BMP + Alb, Alk         Phos, ALT, AST, Total. Bili, TP), Albumin         Random Urine Quantitative with Creat Ratio,         OFFICE/OUTPT VISIT,EST,LEVL IV            (Z12.5) Screening for prostate cancer  Comment:   Plan: PSA, screen, OFFICE/OUTPT VISIT,EST,LEVL IV            (Z13.220) Screening for hyperlipidemia  Comment:   Plan: Lipid panel reflex to direct LDL Non-fasting,         OFFICE/OUTPT VISIT,EST,LEVL IV              Patient has been advised of split billing requirements and indicates understanding: Yes      COUNSELING:  Reviewed preventive health counseling, as reflected in patient instructions        He reports that he quit smoking about 63 years ago. His smoking use included cigarettes. He started smoking about 68 years ago. He has a 2.50 pack-year smoking history. He has never used smokeless tobacco.      Appropriate preventive services were discussed with this patient, including applicable screening as appropriate for cardiovascular disease, diabetes, osteopenia/osteoporosis, and glaucoma.  As appropriate for age/gender, discussed screening for colorectal cancer, prostate cancer, breast cancer, and cervical cancer. Checklist reviewing preventive services available has been given to the patient.    Reviewed patients plan of care and provided an AVS. The Complex Care Plan (for patients with higher acuity and needing more deliberate coordination of services) for Wei meets the Care Plan requirement. This Care Plan has been established and reviewed with the Patient.          Mini Gillis, [facility]    Identified Health Risks:    The patient was provided with written information regarding signs of hearing loss.

## 2023-03-21 NOTE — PATIENT INSTRUCTIONS
High Calcium/ hyperlipidemia / hypertension   Blood work today  Refills sent          Tips for a Healthy Diet    Add more fresh fruits and vegetables to your diet.  The more colorful with the fruit or vegetable (think berries, spinach, carrots, peppers) the healthier it tends to be.  Juice is not a fruit.  Prepare the vegetables in a healthy way - steam, bake.  Avoid breading, butter/oil to cook.  Use herbs and spices instead of salt to season food.  Add more fiber to your diet.  Swap out white bread, white rice, white pasta for whole grain versions.  Reduce the portion size and frequency of carbohydrates/starches.   Choose healthier fats such as nuts, olive oil, avocado, etc. Stay away from lard, butter.  Decrease the frequency and portion size of 'junk food' -pizza, candy, cookies, potato chips, etc.  Watch liquid calories such as coffee drinks, juice, soda, teas.  There tends to be excessive sugar in these beverages.  Increase protein in your diet.  Eggs, cheese, Greek yogurt, lentils, chicken, fish, seafood, are good healthy choices.  Protein keeps you mohr longer, and you are less likely to have blood sugar spikes  Eat healthy at least 80% of the time.  It is ok for a special treat every once in a while, just not every day.  When are you going to indulge (think [employer] time), be sure you are eating extra healthy the day before and after.      Resources:  [facility] Resources for Health and Wellness:https://www.takingcharmissy.[facility].edu/dig-yes-foods    2.   [facility] Ways To Wellness:    https://www.[facility].org/services/ways-to-wellness  Ways to Wellness offers:  Nutrition and weight management   Corrective exercise and fitness training   Lifestyle and behavioral change   Healing services  Our team includes registered dietitians, certified personal trainers, life coaches, as well as a Culinary Educator.    3. Book - Atomic Habits by Benjamin Panchal.  This a good book that looks into our habits and how to sustain  good healthy habits and get rid of bad habits.          Patient Education   Personalized Prevention Plan  You are due for the preventive services outlined below.  Your care team is available to assist you in scheduling these services.  If you have already completed any of these items, please share that information with your care team to update in your medical record.  Health Maintenance Due   Topic Date Due    Cholesterol Lab  03/15/2023    ANNUAL REVIEW OF HM ORDERS  03/15/2023    Complete Blood Count  03/15/2023    Thyroid Function Lab  03/15/2023    Uric Acid Levels  03/15/2023       Signs of Hearing Loss  Hearing loss is a problem shared by many people. In fact, it's one of the most common health problems, particularly as people age. Most people aged 65 and older have some hearing loss. By age 80, almost everyone does. Hearing loss often occurs slowly over the years. So, you may not realize your hearing has gotten worse.   When sudden hearing loss occurs, it's important to contact your healthcare provider right away. Your provider will do a medical exam and a hearing exam as soon as possible. This is to help find the cause and type of your sudden hearing loss. Based on your diagnosis, your healthcare provider will discuss possible treatments.      Hearing much better with one ear can be a sign of hearing loss.     Have your hearing checked  Call your healthcare provider if you:   Have to strain to hear normal conversation  Have to watch other people s faces very carefully to follow what they re saying  Need to ask people to repeat what they ve said  Often misunderstand what people are saying  Turn the volume of the television or radio up so high that others complain  Feel that people are mumbling when they re talking to you  Find that the effort to hear leaves you feeling tired and irritated  Notice, when using the phone, that you hear better with one ear than the other  Shelby last reviewed this educational  content on 6/1/2022 2000-2022 [employer]. All rights reserved. This information is not intended as a substitute for professional medical care. Always follow your healthcare professional's instructions.

## 2023-03-22 LAB — DEPRECATED CALCIDIOL+CALCIFEROL SERPL-MC: 30 UG/L (ref 20–75)

## 2023-03-23 ENCOUNTER — TELEPHONE (OUTPATIENT)
Dept: FAMILY MEDICINE | Facility: CLINIC | Age: 73
End: 2023-03-23
Payer: COMMERCIAL

## 2023-03-23 NOTE — TELEPHONE ENCOUNTER
Central Prior Authorization Team   Phone: [phone]      PA Initiation    Medication: sildenafil (REVATIO) 20 MG tablet  Insurance Company: [employer] - Phone [phone] Fax [phone]  Pharmacy Filling the Rx: [facility] - [place], [place] - [address] [place] [address]  Filling Pharmacy Phone: [phone]  Filling Pharmacy Fax:    Start Date: 3/23/2023

## 2023-03-25 NOTE — TELEPHONE ENCOUNTER
Central Prior Authorization Team   Phone: [phone]      PRIOR AUTHORIZATION DENIED    Medication: sildenafil (REVATIO) 20 MG tablet - PA DENIED    Denial Date: 3/24/2023    Denial Rational:           Appeal Information:

## 2023-03-28 NOTE — TELEPHONE ENCOUNTER
Noted.  Did patient get a cheaper price? See my chart messages?  If not, I can try for another ED med but insurance may still not pay fo rit.

## 2023-03-29 NOTE — TELEPHONE ENCOUNTER
Patient is reached and he is using [facility] and getting his ED meds for a reasonable vann. $25. Mirian BANKS RN

## 2023-07-31 ENCOUNTER — ANCILLARY PROCEDURE (OUTPATIENT)
Dept: GENERAL RADIOLOGY | Facility: CLINIC | Age: 73
End: 2023-07-31
Attending: PHYSICIAN ASSISTANT
Payer: COMMERCIAL

## 2023-07-31 ENCOUNTER — OFFICE VISIT (OUTPATIENT)
Dept: URGENT CARE | Facility: URGENT CARE | Age: 73
End: 2023-07-31
Payer: COMMERCIAL

## 2023-07-31 VITALS
DIASTOLIC BLOOD PRESSURE: 90 MMHG | HEART RATE: 78 BPM | TEMPERATURE: 98.3 F | WEIGHT: 290 LBS | BODY MASS INDEX: 40.69 KG/M2 | SYSTOLIC BLOOD PRESSURE: 155 MMHG | OXYGEN SATURATION: 98 %

## 2023-07-31 DIAGNOSIS — M54.2 NECK PAIN: Primary | ICD-10-CM

## 2023-07-31 DIAGNOSIS — M54.50 ACUTE BILATERAL LOW BACK PAIN WITHOUT SCIATICA: ICD-10-CM
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
## 2023-07-31 DIAGNOSIS — M54.2 NECK PAIN: ICD-10-CM

## 2023-07-31 PROCEDURE — 99214 OFFICE O/P EST MOD 30 MIN: CPT | Performed by: PHYSICIAN ASSISTANT

## 2023-07-31 PROCEDURE — 72100 X-RAY EXAM L-S SPINE 2/3 VWS: CPT | Mod: TC | Performed by: RADIOLOGY

## 2023-07-31 PROCEDURE — 72040 X-RAY EXAM NECK SPINE 2-3 VW: CPT | Mod: TC | Performed by: RADIOLOGY

## 2023-07-31 RX ORDER — CYCLOBENZAPRINE HCL 10 MG
10 TABLET ORAL 3 TIMES DAILY PRN
Qty: 30 TABLET | Refills: 0 | Status: ON HOLD | OUTPATIENT
Start: 2023-07-31 | End: 2024-06-03
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
## 2023-08-01 ENCOUNTER — TELEPHONE (OUTPATIENT)
Dept: FAMILY MEDICINE | Facility: CLINIC | Age: 73
End: 2023-08-01
Payer: COMMERCIAL

## 2023-08-01 NOTE — TELEPHONE ENCOUNTER
Patient Quality Outreach    Patient is due for the following:   Hypertension -  BP check    Next Steps:   Schedule a nurse only visit for BP    Type of outreach:    Sent letter.    Next Steps:  Reach out within 90 days via Letter.    Max number of attempts reached: Yes. Will try again in 90 days if patient still on fail list.    Questions for provider review:    None           Darlene Bauman MA  Chart routed to .

## 2023-08-04 ENCOUNTER — TRANSFERRED RECORDS (OUTPATIENT)
Dept: HEALTH INFORMATION MANAGEMENT | Facility: CLINIC | Age: 73
End: 2023-08-04
Payer: COMMERCIAL

## 2023-08-10 ENCOUNTER — ALLIED HEALTH/NURSE VISIT (OUTPATIENT)
Dept: FAMILY MEDICINE | Facility: CLINIC | Age: 73
End: 2023-08-10
Payer: COMMERCIAL

## 2023-08-10 ENCOUNTER — THERAPY VISIT (OUTPATIENT)
Dept: PHYSICAL THERAPY | Facility: CLINIC | Age: 73
End: 2023-08-10
Attending: PHYSICIAN ASSISTANT
Payer: COMMERCIAL

## 2023-08-10 VITALS — SYSTOLIC BLOOD PRESSURE: 136 MMHG | DIASTOLIC BLOOD PRESSURE: 72 MMHG

## 2023-08-10 DIAGNOSIS — S13.4XXA WHIPLASH: ICD-10-CM
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
## 2023-08-10 DIAGNOSIS — M54.9 BACK PAIN: ICD-10-CM

## 2023-08-10 DIAGNOSIS — M54.2 NECK PAIN: ICD-10-CM

## 2023-08-10 DIAGNOSIS — I10 HYPERTENSION GOAL BP (BLOOD PRESSURE) < 140/90: Primary | ICD-10-CM

## 2023-08-10 PROCEDURE — 99207 PR NO CHARGE NURSE ONLY: CPT | Performed by: INTERNAL MEDICINE

## 2023-08-10 PROCEDURE — 97110 THERAPEUTIC EXERCISES: CPT | Mod: GP | Performed by: PHYSICAL THERAPIST

## 2023-08-10 PROCEDURE — 97162 PT EVAL MOD COMPLEX 30 MIN: CPT | Mod: GP | Performed by: PHYSICAL THERAPIST

## 2023-08-10 NOTE — NURSING NOTE
Wei Kline was evaluated at [facility] on August 10, 2023 at which time his blood pressure was:    BP Readings from Last 1 Encounters:   08/10/23 136/72     No data recorded      Reviewed lifestyle modifications for blood pressure control and reduction: including making healthy food choices, managing weight, getting regular exercise, smoking cessation, reducing alcohol consumption, monitoring blood pressure regularly.     Symptoms: None    BP Goal:< 140/90 mmHg    BP Assessment:  BP at goal    Potential Reasons for BP too high: NA - Not applicable    BP Follow-Up Plan: Recheck BP in 6 months at pharmacy    Recommendation to Provider: none    Note completed by: Paige Corbin, [facility]
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
## 2023-08-17 ENCOUNTER — THERAPY VISIT (OUTPATIENT)
Dept: PHYSICAL THERAPY | Facility: CLINIC | Age: 73
End: 2023-08-17
Attending: PHYSICIAN ASSISTANT
Payer: COMMERCIAL

## 2023-08-17 DIAGNOSIS — M54.9 BACK PAIN: ICD-10-CM
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
## 2023-08-17 DIAGNOSIS — M54.2 NECK PAIN: ICD-10-CM

## 2023-08-17 PROCEDURE — 97110 THERAPEUTIC EXERCISES: CPT | Mod: GP | Performed by: PHYSICAL THERAPIST

## 2023-08-17 PROCEDURE — 97140 MANUAL THERAPY 1/> REGIONS: CPT | Mod: GP | Performed by: PHYSICAL THERAPIST

## 2023-08-24 ENCOUNTER — THERAPY VISIT (OUTPATIENT)
Dept: PHYSICAL THERAPY | Facility: CLINIC | Age: 73
End: 2023-08-24
Attending: PHYSICIAN ASSISTANT
Payer: COMMERCIAL

## 2023-08-24 DIAGNOSIS — M54.2 NECK PAIN: ICD-10-CM

## 2023-08-24 DIAGNOSIS — S13.4XXA WHIPLASH INJURY TO NECK, INITIAL ENCOUNTER: ICD-10-CM

## 2023-08-24 DIAGNOSIS — M54.9 BACK PAIN: ICD-10-CM
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
## 2023-08-24 PROCEDURE — 97140 MANUAL THERAPY 1/> REGIONS: CPT | Mod: GP | Performed by: PHYSICAL MEDICINE & REHABILITATION

## 2023-08-24 PROCEDURE — 97110 THERAPEUTIC EXERCISES: CPT | Mod: GP | Performed by: PHYSICAL MEDICINE & REHABILITATION

## 2023-08-31 ENCOUNTER — THERAPY VISIT (OUTPATIENT)
Dept: PHYSICAL THERAPY | Facility: CLINIC | Age: 73
End: 2023-08-31
Attending: PHYSICIAN ASSISTANT
Payer: COMMERCIAL

## 2023-08-31 DIAGNOSIS — M54.50 LOW BACK PAIN, UNSPECIFIED BACK PAIN LATERALITY, UNSPECIFIED CHRONICITY, UNSPECIFIED WHETHER SCIATICA PRESENT: ICD-10-CM

## 2023-08-31 DIAGNOSIS — M54.2 NECK PAIN: ICD-10-CM
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
## 2023-08-31 PROCEDURE — 97112 NEUROMUSCULAR REEDUCATION: CPT | Mod: GP | Performed by: PHYSICAL THERAPIST

## 2023-08-31 PROCEDURE — 97110 THERAPEUTIC EXERCISES: CPT | Mod: GP | Performed by: PHYSICAL THERAPIST

## 2023-09-07 ENCOUNTER — THERAPY VISIT (OUTPATIENT)
Dept: PHYSICAL THERAPY | Facility: CLINIC | Age: 73
End: 2023-09-07
Attending: PHYSICIAN ASSISTANT
Payer: COMMERCIAL

## 2023-09-07 DIAGNOSIS — S13.4XXA WHIPLASH: ICD-10-CM
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
## 2023-09-07 DIAGNOSIS — M54.9 BACK PAIN: ICD-10-CM

## 2023-09-07 DIAGNOSIS — M54.2 NECK PAIN: ICD-10-CM

## 2023-09-07 PROCEDURE — 97110 THERAPEUTIC EXERCISES: CPT | Mod: GP | Performed by: PHYSICAL THERAPIST

## 2023-09-07 PROCEDURE — 97140 MANUAL THERAPY 1/> REGIONS: CPT | Mod: GP | Performed by: PHYSICAL THERAPIST

## 2023-09-14 ENCOUNTER — THERAPY VISIT (OUTPATIENT)
Dept: PHYSICAL THERAPY | Facility: CLINIC | Age: 73
End: 2023-09-14
Attending: PHYSICIAN ASSISTANT
Payer: COMMERCIAL

## 2023-09-14 DIAGNOSIS — S13.4XXA WHIPLASH: ICD-10-CM

## 2023-09-14 DIAGNOSIS — M54.9 BACK PAIN: ICD-10-CM

## 2023-09-14 DIAGNOSIS — M54.2 NECK PAIN: ICD-10-CM
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
## 2023-09-14 PROCEDURE — 97110 THERAPEUTIC EXERCISES: CPT | Mod: GP | Performed by: PHYSICAL THERAPIST

## 2023-09-14 PROCEDURE — 97140 MANUAL THERAPY 1/> REGIONS: CPT | Mod: GP | Performed by: PHYSICAL THERAPIST

## 2023-09-27 ENCOUNTER — THERAPY VISIT (OUTPATIENT)
Dept: PHYSICAL THERAPY | Facility: CLINIC | Age: 73
End: 2023-09-27
Attending: PHYSICIAN ASSISTANT
Payer: COMMERCIAL

## 2023-09-27 DIAGNOSIS — M54.2 NECK PAIN: Primary | ICD-10-CM

## 2023-09-27 PROCEDURE — 97110 THERAPEUTIC EXERCISES: CPT | Mod: GP | Performed by: PHYSICAL THERAPIST

## 2023-09-27 PROCEDURE — 97140 MANUAL THERAPY 1/> REGIONS: CPT | Mod: GP | Performed by: PHYSICAL THERAPIST

## 2023-10-03 ENCOUNTER — THERAPY VISIT (OUTPATIENT)
Dept: PHYSICAL THERAPY | Facility: CLINIC | Age: 73
End: 2023-10-03
Attending: PHYSICIAN ASSISTANT
Payer: COMMERCIAL

## 2023-10-03 DIAGNOSIS — M54.2 NECK PAIN: Primary | ICD-10-CM

## 2023-10-03 PROCEDURE — 97140 MANUAL THERAPY 1/> REGIONS: CPT | Mod: GP | Performed by: PHYSICAL THERAPIST

## 2023-10-05 ENCOUNTER — THERAPY VISIT (OUTPATIENT)
Dept: PHYSICAL THERAPY | Facility: CLINIC | Age: 73
End: 2023-10-05
Attending: PHYSICIAN ASSISTANT
Payer: COMMERCIAL

## 2023-10-05 DIAGNOSIS — M54.2 NECK PAIN: Primary | ICD-10-CM

## 2023-10-05 PROCEDURE — 97140 MANUAL THERAPY 1/> REGIONS: CPT | Mod: GP | Performed by: PHYSICAL THERAPIST

## 2023-10-09 DIAGNOSIS — N52.9 VASCULOGENIC ERECTILE DYSFUNCTION, UNSPECIFIED VASCULOGENIC ERECTILE DYSFUNCTION TYPE: ICD-10-CM

## 2023-10-09 RX ORDER — SILDENAFIL CITRATE 20 MG/1
TABLET ORAL
Qty: 90 TABLET | Refills: 0 | Status: SHIPPED | OUTPATIENT
Start: 2023-10-09 | End: 2024-01-30

## 2023-10-09 NOTE — TELEPHONE ENCOUNTER
Future appt 7/20/2020    Last appt 1/20 Requested Prescriptions   Pending Prescriptions Disp Refills    sildenafil (REVATIO) 20 MG tablet 90 tablet 11     Sig: Take 1-5 tablets daily as needed, 30-45 minutes prior to sexual activity       There is no refill protocol information for this order          Last office visit: 8/23/2022 ; last virtual visit: Visit date not found with prescribing provider:  Althea Guerra   Future Office Visit:      Thank you,  Mindy BRISENO [facility]

## 2023-10-10 ENCOUNTER — THERAPY VISIT (OUTPATIENT)
Dept: PHYSICAL THERAPY | Facility: CLINIC | Age: 73
End: 2023-10-10
Attending: PHYSICIAN ASSISTANT
Payer: COMMERCIAL

## 2023-10-10 DIAGNOSIS — M54.2 NECK PAIN: Primary | ICD-10-CM

## 2023-10-10 PROCEDURE — 97140 MANUAL THERAPY 1/> REGIONS: CPT | Mod: GP | Performed by: PHYSICAL THERAPIST

## 2023-10-12 ENCOUNTER — THERAPY VISIT (OUTPATIENT)
Dept: PHYSICAL THERAPY | Facility: CLINIC | Age: 73
End: 2023-10-12
Attending: PHYSICIAN ASSISTANT
Payer: COMMERCIAL

## 2023-10-12 DIAGNOSIS — M54.2 NECK PAIN: Primary | ICD-10-CM

## 2023-10-12 PROCEDURE — 97140 MANUAL THERAPY 1/> REGIONS: CPT | Mod: GP | Performed by: PHYSICAL THERAPIST

## 2023-10-12 NOTE — TELEPHONE ENCOUNTER
Second request from [facility]    sildenafil (REVATIO) 20 MG tablet       Juliana Spring   Clinic Station [place]   [facility]  [phone]

## 2023-10-25 ENCOUNTER — THERAPY VISIT (OUTPATIENT)
Dept: PHYSICAL THERAPY | Facility: CLINIC | Age: 73
End: 2023-10-25
Attending: PHYSICIAN ASSISTANT
Payer: COMMERCIAL

## 2023-10-25 DIAGNOSIS — M54.2 NECK PAIN: Primary | ICD-10-CM

## 2023-10-25 PROCEDURE — 97140 MANUAL THERAPY 1/> REGIONS: CPT | Mod: GP | Performed by: PHYSICAL THERAPIST

## 2023-10-25 PROCEDURE — 97110 THERAPEUTIC EXERCISES: CPT | Mod: GP | Performed by: PHYSICAL THERAPIST

## 2023-10-27 ENCOUNTER — THERAPY VISIT (OUTPATIENT)
Dept: PHYSICAL THERAPY | Facility: CLINIC | Age: 73
End: 2023-10-27
Attending: PHYSICIAN ASSISTANT
Payer: COMMERCIAL

## 2023-10-27 DIAGNOSIS — S13.4XXA WHIPLASH: ICD-10-CM

## 2023-10-27 DIAGNOSIS — M54.2 NECK PAIN: Primary | ICD-10-CM

## 2023-10-27 DIAGNOSIS — M54.9 BACK PAIN: ICD-10-CM

## 2023-10-27 PROCEDURE — 97110 THERAPEUTIC EXERCISES: CPT | Mod: GP | Performed by: PHYSICAL THERAPIST

## 2023-10-27 PROCEDURE — 97140 MANUAL THERAPY 1/> REGIONS: CPT | Mod: GP | Performed by: PHYSICAL THERAPIST

## 2023-10-31 ENCOUNTER — THERAPY VISIT (OUTPATIENT)
Dept: PHYSICAL THERAPY | Facility: CLINIC | Age: 73
End: 2023-10-31
Attending: PHYSICIAN ASSISTANT
Payer: COMMERCIAL

## 2023-10-31 DIAGNOSIS — S13.4XXA WHIPLASH: ICD-10-CM
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
## 2023-10-31 DIAGNOSIS — M54.9 BACK PAIN: ICD-10-CM

## 2023-10-31 DIAGNOSIS — M54.2 NECK PAIN: ICD-10-CM

## 2023-10-31 PROCEDURE — 97140 MANUAL THERAPY 1/> REGIONS: CPT | Mod: GP | Performed by: PHYSICAL THERAPIST

## 2023-11-02 ENCOUNTER — THERAPY VISIT (OUTPATIENT)
Dept: PHYSICAL THERAPY | Facility: CLINIC | Age: 73
End: 2023-11-02
Attending: PHYSICIAN ASSISTANT
Payer: COMMERCIAL

## 2023-11-02 DIAGNOSIS — M54.2 NECK PAIN: Primary | ICD-10-CM

## 2023-11-02 PROCEDURE — 97140 MANUAL THERAPY 1/> REGIONS: CPT | Mod: GP | Performed by: PHYSICAL THERAPIST

## 2023-11-07 ENCOUNTER — THERAPY VISIT (OUTPATIENT)
Dept: PHYSICAL THERAPY | Facility: CLINIC | Age: 73
End: 2023-11-07
Attending: PHYSICIAN ASSISTANT
Payer: COMMERCIAL

## 2023-11-07 DIAGNOSIS — M54.2 NECK PAIN: Primary | ICD-10-CM

## 2023-11-07 PROCEDURE — 97110 THERAPEUTIC EXERCISES: CPT | Mod: GP | Performed by: PHYSICAL THERAPIST

## 2023-11-07 PROCEDURE — 97140 MANUAL THERAPY 1/> REGIONS: CPT | Mod: GP | Performed by: PHYSICAL THERAPIST

## 2023-11-09 ENCOUNTER — THERAPY VISIT (OUTPATIENT)
Dept: PHYSICAL THERAPY | Facility: CLINIC | Age: 73
End: 2023-11-09
Attending: PHYSICIAN ASSISTANT
Payer: COMMERCIAL

## 2023-11-09 DIAGNOSIS — M54.2 NECK PAIN: Primary | ICD-10-CM

## 2023-11-09 PROCEDURE — 97140 MANUAL THERAPY 1/> REGIONS: CPT | Mod: GP | Performed by: PHYSICAL THERAPIST

## 2023-11-14 ENCOUNTER — THERAPY VISIT (OUTPATIENT)
Dept: PHYSICAL THERAPY | Facility: CLINIC | Age: 73
End: 2023-11-14
Attending: PHYSICIAN ASSISTANT
Payer: COMMERCIAL

## 2023-11-14 DIAGNOSIS — M54.9 BACK PAIN: ICD-10-CM
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
## 2023-11-14 DIAGNOSIS — S13.4XXA WHIPLASH: ICD-10-CM

## 2023-11-14 DIAGNOSIS — M54.2 NECK PAIN: ICD-10-CM

## 2023-11-14 PROCEDURE — 97140 MANUAL THERAPY 1/> REGIONS: CPT | Mod: GP | Performed by: PHYSICAL THERAPIST

## 2023-11-16 ENCOUNTER — THERAPY VISIT (OUTPATIENT)
Dept: PHYSICAL THERAPY | Facility: CLINIC | Age: 73
End: 2023-11-16
Attending: PHYSICIAN ASSISTANT
Payer: COMMERCIAL

## 2023-11-16 DIAGNOSIS — S13.4XXA WHIPLASH: ICD-10-CM

## 2023-11-16 DIAGNOSIS — M54.2 NECK PAIN: ICD-10-CM
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
## 2023-11-16 DIAGNOSIS — M54.9 BACK PAIN: ICD-10-CM

## 2023-11-16 PROCEDURE — 97140 MANUAL THERAPY 1/> REGIONS: CPT | Mod: GP | Performed by: PHYSICAL THERAPIST

## 2023-11-16 PROCEDURE — 97110 THERAPEUTIC EXERCISES: CPT | Mod: GP | Performed by: PHYSICAL THERAPIST

## 2023-11-21 ENCOUNTER — THERAPY VISIT (OUTPATIENT)
Dept: PHYSICAL THERAPY | Facility: CLINIC | Age: 73
End: 2023-11-21
Attending: PHYSICIAN ASSISTANT
Payer: COMMERCIAL

## 2023-11-21 DIAGNOSIS — M54.2 NECK PAIN: ICD-10-CM

## 2023-11-21 DIAGNOSIS — M54.9 BACK PAIN: ICD-10-CM
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
## 2023-11-21 DIAGNOSIS — S13.4XXA WHIPLASH: ICD-10-CM

## 2023-11-21 PROCEDURE — 97140 MANUAL THERAPY 1/> REGIONS: CPT | Mod: GP | Performed by: PHYSICAL THERAPIST

## 2023-11-21 PROCEDURE — 97110 THERAPEUTIC EXERCISES: CPT | Mod: GP | Performed by: PHYSICAL THERAPIST

## 2023-11-28 ENCOUNTER — THERAPY VISIT (OUTPATIENT)
Dept: PHYSICAL THERAPY | Facility: CLINIC | Age: 73
End: 2023-11-28
Attending: PHYSICIAN ASSISTANT
Payer: COMMERCIAL

## 2023-11-28 DIAGNOSIS — M54.2 NECK PAIN: ICD-10-CM
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
## 2023-11-28 DIAGNOSIS — S13.4XXA WHIPLASH: ICD-10-CM

## 2023-11-28 DIAGNOSIS — M54.9 BACK PAIN: ICD-10-CM

## 2023-11-28 PROCEDURE — 97110 THERAPEUTIC EXERCISES: CPT | Mod: GP | Performed by: PHYSICAL THERAPIST

## 2023-11-28 PROCEDURE — 97140 MANUAL THERAPY 1/> REGIONS: CPT | Mod: GP | Performed by: PHYSICAL THERAPIST

## 2023-11-30 ENCOUNTER — THERAPY VISIT (OUTPATIENT)
Dept: PHYSICAL THERAPY | Facility: CLINIC | Age: 73
End: 2023-11-30
Attending: PHYSICIAN ASSISTANT
Payer: COMMERCIAL

## 2023-11-30 DIAGNOSIS — S13.4XXA WHIPLASH: ICD-10-CM

## 2023-11-30 DIAGNOSIS — M54.9 BACK PAIN: ICD-10-CM

## 2023-11-30 DIAGNOSIS — M54.2 NECK PAIN: ICD-10-CM
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
## 2023-11-30 PROCEDURE — 97110 THERAPEUTIC EXERCISES: CPT | Mod: GP | Performed by: PHYSICAL THERAPIST

## 2023-11-30 PROCEDURE — 97140 MANUAL THERAPY 1/> REGIONS: CPT | Mod: GP | Performed by: PHYSICAL THERAPIST
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
## 2023-12-08 ENCOUNTER — THERAPY VISIT (OUTPATIENT)
Dept: PHYSICAL THERAPY | Facility: CLINIC | Age: 73
End: 2023-12-08
Attending: PHYSICIAN ASSISTANT
Payer: COMMERCIAL

## 2023-12-08 DIAGNOSIS — M54.9 BACK PAIN: ICD-10-CM

## 2023-12-08 DIAGNOSIS — M54.2 NECK PAIN: ICD-10-CM
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
## 2023-12-08 DIAGNOSIS — S13.4XXA WHIPLASH: ICD-10-CM

## 2023-12-08 PROCEDURE — 97140 MANUAL THERAPY 1/> REGIONS: CPT | Mod: GP | Performed by: PHYSICAL THERAPIST

## 2023-12-08 PROCEDURE — 97110 THERAPEUTIC EXERCISES: CPT | Mod: GP | Performed by: PHYSICAL THERAPIST

## 2023-12-12 ENCOUNTER — THERAPY VISIT (OUTPATIENT)
Dept: PHYSICAL THERAPY | Facility: CLINIC | Age: 73
End: 2023-12-12
Attending: PHYSICIAN ASSISTANT
Payer: COMMERCIAL

## 2023-12-12 DIAGNOSIS — M54.2 NECK PAIN: Primary | ICD-10-CM
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
## 2023-12-12 DIAGNOSIS — M54.9 BACK PAIN: ICD-10-CM

## 2023-12-12 PROCEDURE — 97140 MANUAL THERAPY 1/> REGIONS: CPT | Mod: GP | Performed by: PHYSICAL THERAPIST

## 2023-12-12 PROCEDURE — 97110 THERAPEUTIC EXERCISES: CPT | Mod: GP | Performed by: PHYSICAL THERAPIST

## 2023-12-20 ENCOUNTER — THERAPY VISIT (OUTPATIENT)
Dept: PHYSICAL THERAPY | Facility: CLINIC | Age: 73
End: 2023-12-20
Attending: PHYSICIAN ASSISTANT
Payer: COMMERCIAL

## 2023-12-20 DIAGNOSIS — S13.4XXA WHIPLASH: ICD-10-CM
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
## 2023-12-20 DIAGNOSIS — M54.2 NECK PAIN: ICD-10-CM

## 2023-12-20 DIAGNOSIS — M54.9 BACK PAIN: ICD-10-CM

## 2023-12-20 PROCEDURE — 97140 MANUAL THERAPY 1/> REGIONS: CPT | Mod: GP | Performed by: PHYSICAL THERAPIST

## 2023-12-20 PROCEDURE — 97110 THERAPEUTIC EXERCISES: CPT | Mod: GP | Performed by: PHYSICAL THERAPIST

## 2024-01-02 ENCOUNTER — TRANSFERRED RECORDS (OUTPATIENT)
Dept: HEALTH INFORMATION MANAGEMENT | Facility: CLINIC | Age: 74
End: 2024-01-02
Payer: COMMERCIAL

## 2024-01-04 ENCOUNTER — THERAPY VISIT (OUTPATIENT)
Dept: PHYSICAL THERAPY | Facility: CLINIC | Age: 74
End: 2024-01-04
Attending: PHYSICIAN ASSISTANT
Payer: COMMERCIAL
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
## 2024-01-04 DIAGNOSIS — S13.4XXA WHIPLASH: ICD-10-CM

## 2024-01-04 DIAGNOSIS — M54.9 BACK PAIN: ICD-10-CM

## 2024-01-04 DIAGNOSIS — M54.2 NECK PAIN: ICD-10-CM

## 2024-01-04 PROCEDURE — 97110 THERAPEUTIC EXERCISES: CPT | Mod: GP | Performed by: PHYSICAL THERAPIST

## 2024-01-04 PROCEDURE — 97140 MANUAL THERAPY 1/> REGIONS: CPT | Mod: GP | Performed by: PHYSICAL THERAPIST

## 2024-01-10 ENCOUNTER — THERAPY VISIT (OUTPATIENT)
Dept: PHYSICAL THERAPY | Facility: CLINIC | Age: 74
End: 2024-01-10
Attending: PHYSICIAN ASSISTANT
Payer: COMMERCIAL

## 2024-01-10 DIAGNOSIS — M54.9 BACK PAIN: ICD-10-CM

## 2024-01-10 DIAGNOSIS — S13.4XXA WHIPLASH: ICD-10-CM

## 2024-01-10 DIAGNOSIS — M54.2 NECK PAIN: ICD-10-CM
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
## 2024-01-10 PROCEDURE — 97110 THERAPEUTIC EXERCISES: CPT | Mod: GP | Performed by: PHYSICAL THERAPIST
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
## 2024-01-23 ENCOUNTER — THERAPY VISIT (OUTPATIENT)
Dept: PHYSICAL THERAPY | Facility: CLINIC | Age: 74
End: 2024-01-23
Attending: PHYSICIAN ASSISTANT
Payer: COMMERCIAL

## 2024-01-23 DIAGNOSIS — M54.2 NECK PAIN: Primary | ICD-10-CM

## 2024-01-23 PROCEDURE — 97110 THERAPEUTIC EXERCISES: CPT | Mod: GP | Performed by: PHYSICAL THERAPIST

## 2024-01-30 ENCOUNTER — OFFICE VISIT (OUTPATIENT)
Dept: UROLOGY | Facility: CLINIC | Age: 74
End: 2024-01-30
Attending: STUDENT IN AN ORGANIZED HEALTH CARE EDUCATION/TRAINING PROGRAM
Payer: COMMERCIAL

## 2024-01-30 VITALS
DIASTOLIC BLOOD PRESSURE: 87 MMHG | HEART RATE: 69 BPM | BODY MASS INDEX: 40.8 KG/M2 | WEIGHT: 291.4 LBS | SYSTOLIC BLOOD PRESSURE: 142 MMHG | TEMPERATURE: 97.2 F | HEIGHT: 71 IN | OXYGEN SATURATION: 97 %

## 2024-01-30 DIAGNOSIS — N52.9 VASCULOGENIC ERECTILE DYSFUNCTION, UNSPECIFIED VASCULOGENIC ERECTILE DYSFUNCTION TYPE: ICD-10-CM

## 2024-01-30 PROCEDURE — 99213 OFFICE O/P EST LOW 20 MIN: CPT | Performed by: STUDENT IN AN ORGANIZED HEALTH CARE EDUCATION/TRAINING PROGRAM

## 2024-01-30 RX ORDER — SILDENAFIL CITRATE 20 MG/1
TABLET ORAL
Qty: 90 TABLET | Refills: 3 | Status: SHIPPED | OUTPATIENT
Start: 2024-01-30

## 2024-01-30 ASSESSMENT — PAIN SCALES - GENERAL: PAINLEVEL: NO PAIN (0)

## 2024-01-30 NOTE — NURSING NOTE
"Initial BP (!) 142/87   Pulse 69   Temp 97.2  F (36.2  C) (Tympanic)   Ht 1.797 m (5' 10.75\")   Wt 132.2 kg (291 lb 6.4 oz)   SpO2 97%   BMI 40.93 kg/m   Estimated body mass index is 40.93 kg/m  as calculated from the following:    Height as of this encounter: 1.797 m (5' 10.75\").    Weight as of this encounter: 132.2 kg (291 lb 6.4 oz). .  Sarahi PENNINGTON CMA 01/30/24 1:07 PM  "

## 2024-01-30 NOTE — PROGRESS NOTES
"    UROLOGY FOLLOW-UP NOTE          Chief Complaint:   Today I had the pleasure of seeing Mr. Wei Kline in follow-up for a chief complaint of erectile dysfunction.          Interval Update   Wei Kline is a very pleasant 73 year old male  with a history of kidney stones, hypothyroidism, LILLY, HTN, HLD, and hyperglycemia.    Brief History: Mr. Wei Kline was last seen on 8/23/2022 for erectile dysfunction. He started sildenafil prn.     Today's notes: He is doing well today. He takes sildenadil 60 mg prn, which is useful for management of erections.          Physical Exam:   Patient is a 73 year old  male   Vitals: Blood pressure (!) 142/87, pulse 69, temperature 97.2  F (36.2  C), temperature source Tympanic, height 1.797 m (5' 10.75\"), weight 132.2 kg (291 lb 6.4 oz), SpO2 97%.  General: Alert and oriented x 3, no acute distress.  Respiratory: Non-labored breathing.  Cardiac: Regular rate.      Labs and Pathology:    I personally reviewed all applicable laboratory data and went over findings with patient  Significant for:    CBC RESULTS:  Recent Labs   Lab Test 03/21/23  1434 03/15/22  1921 03/15/22  1436 04/12/21  0928   WBC 8.3 8.8 2.8* 7.3   HGB 14.9 15.0 5.0* 14.3   * 147* 31* 161        BMP RESULTS:  Recent Labs   Lab Test 03/21/23  1434 03/15/22  1921 03/15/22  1449 04/12/21  0920 03/01/21  1039 12/09/19  1011 03/22/19  0903    142 140 137 138 137 140   POTASSIUM 4.7 4.3 4.7 4.7 4.3 4.5 4.4   CHLORIDE 107 110* 108 107 107 108 109   CO2 22 28 27 27 28 26 27   ANIONGAP 11 4 5 3 3 3 4   GLC 85 104* 87 101* 92 87 94   BUN 25.5* 27 26 26 22 29 23   CR 1.33* 1.33* 1.30* 1.31* 1.24 1.19 1.18   GFRESTIMATED 56* 56* 58* 54* 58* 61 62   GFRESTBLACK  --   --   --  63 67 71 72   FLAVIA 10.3* 10.3* 10.5* 9.7 10.3* 9.8 9.4       UA RESULTS:   No results for input(s): \"SG\", \"URINEPH\", \"NITRITE\", \"RBCU\", \"WBCU\" in the last 29738 hours.    PSA RESULTS  PSA   Date Value Ref Range Status   03/01/2021 1.30 0 - " 4 ug/L Final     Comment:     Assay Method:  Chemiluminescence using Siemens Vista analyzer   03/03/2017 0.89 0 - 4 ug/L Final     Comment:     Assay Method:  Chemiluminescence using Siemens Vista analyzer     Prostate Specific Antigen Screen   Date Value Ref Range Status   03/21/2023 1.48 0.00 - 6.50 ng/mL Final   03/15/2022 1.38 0.00 - 4.00 ug/L Final            Assessment/Plan   73 year old male seen in follow up for erectile dysfunction, well managed with sildenafil 60 mg daily.     Plan:  Continue sildenafil 20 mg, take 1-5 tables prn for sexual activity.   Follow up as needed.          Past Medical History:     Past Medical History:   Diagnosis Date    Gout, unspecified 01/16/2015    Diagnosed in 2009 at [facility], no flares since starting allopurinol.    History of cardiomyopathy 01/16/2015    Had angiogram in 2002- clean arteries.  Thought to be viral.  Mild decrease in EF initially but last echo in 2014 was normal.    History of Lyme disease 01/16/2015    Hyperlipidemia LDL goal < 100 01/16/2015    Hypertension goal BP (blood pressure) < 140/90 01/16/2015    Hypothyroidism 01/16/2015    Kidney stone     Morbid obesity (H)     LILLY (obstructive sleep apnea)     Umbilical hernia without obstruction and without gangrene             Past Surgical History:     Past Surgical History:   Procedure Laterality Date    arthroscopic knee surgery  1990's    bilateral knees (no replacement)    ARTHROSCOPY ANKLE, OPEN REPAIR LIGAMENT, COMBINED Left 12/26/2019    Procedure: Left Ankle: arthroscopy - eval and debridement; cartilage repair procedures; lateral ankle ligament repair; gastroc recession;  Surgeon: Sandro Castro DPM;  Location: [facility]    COLONOSCOPY N/A 6/30/2020    Procedure: COLONOSCOPY, WITH POLYPECTOMY AND BIOPSY;  Surgeon: Wes Dowd MD;  Location: [facility]    TONSILLECTOMY              Medications     Current Outpatient Medications   Medication    sildenafil (REVATIO) 20 MG tablet    allopurinol  (ZYLOPRIM) 300 MG tablet    atorvastatin (LIPITOR) 20 MG tablet    cyclobenzaprine (FLEXERIL) 10 MG tablet    levothyroxine (SYNTHROID/LEVOTHROID) 137 MCG tablet    metoprolol tartrate (LOPRESSOR) 25 MG tablet    order for DME    telmisartan (MICARDIS) 80 MG tablet    vitamin C (ASCORBIC ACID) 1000 MG TABS     No current facility-administered medications for this visit.            Family History:     Family History   Problem Relation Age of Onset    Breast Cancer Mother     Cancer Father     Cerebrovascular Disease Paternal Grandmother     Cancer - colorectal Paternal Grandfather     Cancer - colorectal Brother     Diabetes Brother     Peptic Ulcer Disease Brother     Lupus Sister     Hypertension Son     Diabetes Brother     Colon Cancer Brother     Hypertension Son             Social History:     Social History     Socioeconomic History    Marital status:      Spouse name: Not on file    Number of children: Not on file    Years of education: Not on file    Highest education level: Not on file   Occupational History    Not on file   Tobacco Use    Smoking status: Former     Packs/day: 0.50     Years: 5.00     Additional pack years: 0.00     Total pack years: 2.50     Types: Cigarettes     Start date: 1955     Quit date: 1960     Years since quittin.0    Smokeless tobacco: Never   Vaping Use    Vaping Use: Never used   Substance and Sexual Activity    Alcohol use: Yes     Comment: occasional    Drug use: No    Sexual activity: Yes     Partners: Female     Birth control/protection: None   Other Topics Concern    Parent/sibling w/ CABG, MI or angioplasty before 65F 55M? No   Social History Narrative    Not on file     Social Determinants of Health     Financial Resource Strain: Not on file   Food Insecurity: Not on file   Transportation Needs: Not on file   Physical Activity: Not on file   Stress: Not on file   Social Connections: Not on file   Interpersonal Safety: Not on file   Housing  Stability: Not on file            Allergies:   Nka [no known allergies]         Review of Systems:  From intake questionnaire   Negative 14 system review except as noted on HPI, nurse's note.        LEXIE TARANGO PA-C  Department of Urology

## 2024-02-01 ENCOUNTER — TELEPHONE (OUTPATIENT)
Dept: FAMILY MEDICINE | Facility: CLINIC | Age: 74
End: 2024-02-01
Payer: COMMERCIAL

## 2024-02-01 NOTE — TELEPHONE ENCOUNTER
Patient Quality Outreach    Patient is due for the following:   Hypertension -  BP check  Colon Cancer Screening    Next Steps:   Patient has upcoming appointment, these items will be addressed at that time.    Type of outreach:    Chart review performed, no outreach needed.    Next Steps:  Reach out within 90 days via Letter.    Max number of attempts reached: Yes. Will try again in 90 days if patient still on fail list.    Questions for provider review:    None           Darlene Bauman MA  Chart routed to .

## 2024-02-06 ENCOUNTER — THERAPY VISIT (OUTPATIENT)
Dept: PHYSICAL THERAPY | Facility: CLINIC | Age: 74
End: 2024-02-06
Attending: PHYSICIAN ASSISTANT
Payer: COMMERCIAL

## 2024-02-06 ENCOUNTER — ALLIED HEALTH/NURSE VISIT (OUTPATIENT)
Dept: FAMILY MEDICINE | Facility: CLINIC | Age: 74
End: 2024-02-06
Payer: COMMERCIAL

## 2024-02-06 VITALS — SYSTOLIC BLOOD PRESSURE: 128 MMHG | DIASTOLIC BLOOD PRESSURE: 74 MMHG

## 2024-02-06 DIAGNOSIS — I10 HYPERTENSION GOAL BP (BLOOD PRESSURE) < 140/90: Primary | ICD-10-CM

## 2024-02-06 DIAGNOSIS — M54.9 BACK PAIN: ICD-10-CM

## 2024-02-06 DIAGNOSIS — S13.4XXA WHIPLASH: ICD-10-CM
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
## 2024-02-06 DIAGNOSIS — M54.2 NECK PAIN: ICD-10-CM

## 2024-02-06 PROCEDURE — 97110 THERAPEUTIC EXERCISES: CPT | Mod: GP | Performed by: PHYSICAL THERAPIST

## 2024-02-06 PROCEDURE — 99207 PR NO CHARGE NURSE ONLY: CPT | Performed by: INTERNAL MEDICINE

## 2024-02-06 NOTE — PROGRESS NOTES
Wei Kline was evaluated at [facility] on February 6, 2024 at which time his blood pressure was:    BP Readings from Last 3 Encounters:   02/06/24 128/74   01/30/24 (!) 142/87   08/10/23 136/72     Pulse Readings from Last 3 Encounters:   01/30/24 69   07/31/23 78   03/21/23 65       Reviewed lifestyle modifications for blood pressure control and reduction: including making healthy food choices, managing weight, getting regular exercise, smoking cessation, reducing alcohol consumption, monitoring blood pressure regularly.     Symptoms: None    BP Goal:< 140/90 mmHg    BP Assessment:  BP at goal    Potential Reasons for BP too high: NA - Not applicable    BP Follow-Up Plan: Recheck BP in 6 months at pharmacy    Recommendation to Provider: None at this time.    Note completed by:   Maricruz Carter, PharmD  Staff Pharmacist  [facility]  [phone]

## 2024-02-22 ENCOUNTER — THERAPY VISIT (OUTPATIENT)
Dept: PHYSICAL THERAPY | Facility: CLINIC | Age: 74
End: 2024-02-22
Attending: PHYSICIAN ASSISTANT
Payer: COMMERCIAL

## 2024-02-22 DIAGNOSIS — S13.4XXA WHIPLASH: ICD-10-CM

## 2024-02-22 DIAGNOSIS — M54.9 BACK PAIN: ICD-10-CM
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
## 2024-02-22 DIAGNOSIS — M54.2 NECK PAIN: ICD-10-CM

## 2024-02-22 PROCEDURE — 97140 MANUAL THERAPY 1/> REGIONS: CPT | Mod: GP | Performed by: PHYSICAL THERAPIST

## 2024-02-22 PROCEDURE — 97110 THERAPEUTIC EXERCISES: CPT | Mod: GP | Performed by: PHYSICAL THERAPIST

## 2024-02-23 ENCOUNTER — TRANSCRIBE ORDERS (OUTPATIENT)
Dept: OTHER | Age: 74
End: 2024-02-23

## 2024-03-14 ENCOUNTER — THERAPY VISIT (OUTPATIENT)
Dept: PHYSICAL THERAPY | Facility: CLINIC | Age: 74
End: 2024-03-14
Attending: PHYSICIAN ASSISTANT
Payer: COMMERCIAL

## 2024-03-14 DIAGNOSIS — M54.9 BACK PAIN: ICD-10-CM

## 2024-03-14 DIAGNOSIS — S13.4XXA WHIPLASH: ICD-10-CM

## 2024-03-14 DIAGNOSIS — M54.2 NECK PAIN: ICD-10-CM
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
## 2024-03-14 PROCEDURE — 97140 MANUAL THERAPY 1/> REGIONS: CPT | Mod: GP | Performed by: PHYSICAL THERAPIST

## 2024-03-14 PROCEDURE — 97110 THERAPEUTIC EXERCISES: CPT | Mod: GP | Performed by: PHYSICAL THERAPIST
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
## 2024-04-02 SDOH — HEALTH STABILITY: PHYSICAL HEALTH: ON AVERAGE, HOW MANY MINUTES DO YOU ENGAGE IN EXERCISE AT THIS LEVEL?: 30 MIN

## 2024-04-02 SDOH — HEALTH STABILITY: PHYSICAL HEALTH: ON AVERAGE, HOW MANY DAYS PER WEEK DO YOU ENGAGE IN MODERATE TO STRENUOUS EXERCISE (LIKE A BRISK WALK)?: 7 DAYS

## 2024-04-02 ASSESSMENT — SOCIAL DETERMINANTS OF HEALTH (SDOH): HOW OFTEN DO YOU GET TOGETHER WITH FRIENDS OR RELATIVES?: THREE TIMES A WEEK

## 2024-04-05 ENCOUNTER — OFFICE VISIT (OUTPATIENT)
Dept: FAMILY MEDICINE | Facility: CLINIC | Age: 74
End: 2024-04-05
Payer: COMMERCIAL

## 2024-04-05 VITALS
DIASTOLIC BLOOD PRESSURE: 70 MMHG | HEART RATE: 63 BPM | OXYGEN SATURATION: 93 % | HEIGHT: 71 IN | SYSTOLIC BLOOD PRESSURE: 118 MMHG | BODY MASS INDEX: 41.48 KG/M2 | TEMPERATURE: 97 F | WEIGHT: 296.3 LBS | RESPIRATION RATE: 12 BRPM

## 2024-04-05 VITALS
WEIGHT: 296.3 LBS | HEART RATE: 63 BPM | OXYGEN SATURATION: 93 % | BODY MASS INDEX: 41.48 KG/M2 | HEIGHT: 71 IN | RESPIRATION RATE: 12 BRPM | SYSTOLIC BLOOD PRESSURE: 118 MMHG | DIASTOLIC BLOOD PRESSURE: 70 MMHG | TEMPERATURE: 97 F

## 2024-04-05 DIAGNOSIS — E66.01 MORBID OBESITY (H): ICD-10-CM

## 2024-04-05 DIAGNOSIS — M1A.09X0 IDIOPATHIC CHRONIC GOUT OF MULTIPLE SITES WITHOUT TOPHUS: ICD-10-CM

## 2024-04-05 DIAGNOSIS — E78.5 HYPERLIPIDEMIA WITH TARGET LDL LESS THAN 100: ICD-10-CM

## 2024-04-05 DIAGNOSIS — M79.18 MYOFASCIAL PAIN: ICD-10-CM

## 2024-04-05 DIAGNOSIS — E03.9 ACQUIRED HYPOTHYROIDISM: ICD-10-CM

## 2024-04-05 DIAGNOSIS — I10 HYPERTENSION GOAL BP (BLOOD PRESSURE) < 140/90: ICD-10-CM

## 2024-04-05 DIAGNOSIS — M10.9 GOUT, UNSPECIFIED CAUSE, UNSPECIFIED CHRONICITY, UNSPECIFIED SITE: ICD-10-CM

## 2024-04-05 DIAGNOSIS — Z12.11 SCREEN FOR COLON CANCER: ICD-10-CM

## 2024-04-05 DIAGNOSIS — N18.31 CHRONIC KIDNEY DISEASE, STAGE 3A (H): ICD-10-CM

## 2024-04-05 DIAGNOSIS — Z00.00 ENCOUNTER FOR MEDICARE ANNUAL WELLNESS EXAM: Primary | ICD-10-CM
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
## 2024-04-05 LAB
ALBUMIN UR-MCNC: NEGATIVE MG/DL
ANION GAP SERPL CALCULATED.3IONS-SCNC: 6 MMOL/L (ref 7–15)
APPEARANCE UR: CLEAR
BILIRUB UR QL STRIP: NEGATIVE
BUN SERPL-MCNC: 23.2 MG/DL (ref 8–23)
CALCIUM SERPL-MCNC: 10.4 MG/DL (ref 8.8–10.2)
CHLORIDE SERPL-SCNC: 108 MMOL/L (ref 98–107)
CHOLEST SERPL-MCNC: 155 MG/DL
COLOR UR AUTO: YELLOW
CREAT SERPL-MCNC: 1.38 MG/DL (ref 0.67–1.17)
CREAT UR-MCNC: 92.2 MG/DL
DEPRECATED HCO3 PLAS-SCNC: 27 MMOL/L (ref 22–29)
EGFRCR SERPLBLD CKD-EPI 2021: 53 ML/MIN/1.73M2
ERYTHROCYTE [DISTWIDTH] IN BLOOD BY AUTOMATED COUNT: 13.4 % (ref 10–15)
FASTING STATUS PATIENT QL REPORTED: YES
GLUCOSE SERPL-MCNC: 100 MG/DL (ref 70–99)
GLUCOSE UR STRIP-MCNC: NEGATIVE MG/DL
HCT VFR BLD AUTO: 46.8 % (ref 40–53)
HDLC SERPL-MCNC: 58 MG/DL
HGB BLD-MCNC: 15.2 G/DL (ref 13.3–17.7)
HGB UR QL STRIP: NEGATIVE
KETONES UR STRIP-MCNC: NEGATIVE MG/DL
LDLC SERPL CALC-MCNC: 72 MG/DL
LEUKOCYTE ESTERASE UR QL STRIP: NEGATIVE
MCH RBC QN AUTO: 30.8 PG (ref 26.5–33)
MCHC RBC AUTO-ENTMCNC: 32.5 G/DL (ref 31.5–36.5)
MCV RBC AUTO: 95 FL (ref 78–100)
MICROALBUMIN UR-MCNC: <12 MG/L
MICROALBUMIN/CREAT UR: NORMAL MG/G{CREAT}
NITRATE UR QL: NEGATIVE
NONHDLC SERPL-MCNC: 97 MG/DL
PH UR STRIP: 5.5 [PH] (ref 5–7)
PLATELET # BLD AUTO: 137 10E3/UL (ref 150–450)
POTASSIUM SERPL-SCNC: 5.1 MMOL/L (ref 3.4–5.3)
RBC # BLD AUTO: 4.94 10E6/UL (ref 4.4–5.9)
SODIUM SERPL-SCNC: 141 MMOL/L (ref 135–145)
SP GR UR STRIP: 1.02 (ref 1–1.03)
TRIGL SERPL-MCNC: 127 MG/DL
TSH SERPL DL<=0.005 MIU/L-ACNC: 1.53 UIU/ML (ref 0.3–4.2)
URATE SERPL-MCNC: 6.3 MG/DL (ref 3.4–7)
UROBILINOGEN UR STRIP-ACNC: 0.2 E.U./DL
WBC # BLD AUTO: 7 10E3/UL (ref 4–11)

## 2024-04-05 PROCEDURE — 82043 UR ALBUMIN QUANTITATIVE: CPT | Performed by: INTERNAL MEDICINE

## 2024-04-05 PROCEDURE — 99214 OFFICE O/P EST MOD 30 MIN: CPT | Mod: 25 | Performed by: INTERNAL MEDICINE

## 2024-04-05 PROCEDURE — 85027 COMPLETE CBC AUTOMATED: CPT | Performed by: INTERNAL MEDICINE

## 2024-04-05 PROCEDURE — 82570 ASSAY OF URINE CREATININE: CPT | Performed by: INTERNAL MEDICINE

## 2024-04-05 PROCEDURE — 81003 URINALYSIS AUTO W/O SCOPE: CPT | Performed by: INTERNAL MEDICINE

## 2024-04-05 PROCEDURE — 84443 ASSAY THYROID STIM HORMONE: CPT | Performed by: INTERNAL MEDICINE

## 2024-04-05 PROCEDURE — 84550 ASSAY OF BLOOD/URIC ACID: CPT | Performed by: INTERNAL MEDICINE

## 2024-04-05 PROCEDURE — 99213 OFFICE O/P EST LOW 20 MIN: CPT | Performed by: INTERNAL MEDICINE

## 2024-04-05 PROCEDURE — 80048 BASIC METABOLIC PNL TOTAL CA: CPT | Performed by: INTERNAL MEDICINE

## 2024-04-05 PROCEDURE — 36415 COLL VENOUS BLD VENIPUNCTURE: CPT | Performed by: INTERNAL MEDICINE

## 2024-04-05 PROCEDURE — 80061 LIPID PANEL: CPT | Performed by: INTERNAL MEDICINE

## 2024-04-05 PROCEDURE — G0439 PPPS, SUBSEQ VISIT: HCPCS | Performed by: INTERNAL MEDICINE

## 2024-04-05 RX ORDER — METOPROLOL TARTRATE 25 MG/1
12.5 TABLET, FILM COATED ORAL 2 TIMES DAILY
Qty: 90 TABLET | Refills: 4 | Status: SHIPPED | OUTPATIENT
Start: 2024-04-05

## 2024-04-05 RX ORDER — PHENTERMINE HYDROCHLORIDE 15 MG/1
15 CAPSULE ORAL EVERY MORNING
Qty: 30 CAPSULE | Refills: 2 | Status: SHIPPED | OUTPATIENT
Start: 2024-04-05 | End: 2024-06-11

## 2024-04-05 RX ORDER — LEVOTHYROXINE SODIUM 137 UG/1
TABLET ORAL
Qty: 90 TABLET | Refills: 4 | Status: SHIPPED | OUTPATIENT
Start: 2024-04-05

## 2024-04-05 RX ORDER — ALLOPURINOL 300 MG/1
300 TABLET ORAL DAILY
Qty: 90 TABLET | Refills: 4 | Status: SHIPPED | OUTPATIENT
Start: 2024-04-05

## 2024-04-05 RX ORDER — TELMISARTAN 80 MG/1
80 TABLET ORAL DAILY
Qty: 90 TABLET | Refills: 3 | Status: SHIPPED | OUTPATIENT
Start: 2024-04-05

## 2024-04-05 RX ORDER — ATORVASTATIN CALCIUM 20 MG/1
20 TABLET, FILM COATED ORAL DAILY
Qty: 90 TABLET | Refills: 3 | Status: SHIPPED | OUTPATIENT
Start: 2024-04-05

## 2024-04-05 ASSESSMENT — PAIN SCALES - GENERAL
PAINLEVEL: NO PAIN (0)
PAINLEVEL: MODERATE PAIN (5)

## 2024-04-05 NOTE — PATIENT INSTRUCTIONS
Neck and back pain  Contact Gildardo at [facility] for lumbar MRI and follow-up from there  Ok to continue with over the counter treatment  Can try TENS

## 2024-04-05 NOTE — PROGRESS NOTES
Preventive Care Visit  [facility]  Mini Gillis DO, Internal Medicine  Apr 5, 2024      Assessment & Plan     Encounter for Medicare annual wellness exam    Gout, unspecified cause, unspecified chronicity, unspecified site   - stable, refill provided  - allopurinol (ZYLOPRIM) 300 MG tablet; Take 1 tablet (300 mg) by mouth daily  - OFFICE/OUTPT VISIT,EST,LEVL IV  - CBC with platelets; Future  - CBC with Platelets & Differential; Future  - **Uric acid FUTURE 2mo; Future  - CBC with platelets    Hyperlipidemia with target LDL less than 100   - stable, refill provided  - atorvastatin (LIPITOR) 20 MG tablet; Take 1 tablet (20 mg) by mouth daily  - OFFICE/OUTPT VISIT,EST,LEVL IV  - Lipid panel reflex to direct LDL Fasting; Future  - Lipid panel reflex to direct LDL Fasting    Acquired hypothyroidism   - stable, refill provided  - levothyroxine (SYNTHROID/LEVOTHROID) 137 MCG tablet; TAKE 1 TABLET (137 MCG) BY MOUTH DAILY  - OFFICE/OUTPT VISIT,EST,LEVL IV  - TSH with free T4 reflex; Future  - TSH with free T4 reflex    Hypertension goal BP (blood pressure) < 140/90   - stable, refill provided  - metoprolol tartrate (LOPRESSOR) 25 MG tablet; Take 0.5 tablets (12.5 mg) by mouth 2 times daily  - telmisartan (MICARDIS) 80 MG tablet; Take 1 tablet (80 mg) by mouth daily  - OFFICE/OUTPT VISIT,EST,LEVL IV  - Basic metabolic panel; Future  - Basic metabolic panel    Chronic kidney disease, stage 3a (H)  Consider SGLT2 if GFR is worse  - UA Macroscopic with reflex to Microscopic and Culture; Future  - OFFICE/OUTPT VISIT,EST,LEVL IV  - Albumin Random Urine Quantitative with Creat Ratio; Future  - Albumin Random Urine Quantitative with Creat Ratio; Future  - Albumin Random Urine Quantitative with Creat Ratio  - UA Macroscopic with reflex to Microscopic and Culture    Morbid obesity (H)  Discussed diet and lifestyle at length.  Start medication.  Follow-up in 2 months  - OFFICE/OUTPT VISIT,EST,LEVL  "IV  - phentermine 15 MG capsule; Take 1 capsule (15 mg) by mouth every morning    Idiopathic chronic gout of multiple sites without tophus  - Uric acid; Future  - OFFICE/OUTPT VISIT,EST,LEVL IV  - Uric acid    Screen for colon cancer  Due   - Colonoscopy Screening  Referral; Future  - OFFICE/OUTPT VISIT,EST,LEVL IV    Patient has been advised of split billing requirements and indicates understanding: Yes          BMI  Estimated body mass index is 41.33 kg/m  as calculated from the following:    Height as of this encounter: 1.803 m (5' 11\").    Weight as of this encounter: 134.4 kg (296 lb 4.8 oz).   Weight management plan: Med plan as above    Counseling  Appropriate preventive services were discussed with this patient, including applicable screening as appropriate for fall prevention, nutrition, physical activity, Tobacco-use cessation, weight loss and cognition.  Checklist reviewing preventive services available has been given to the patient.  Reviewed patient's diet, addressing concerns and/or questions.   The patient was provided with written information regarding signs of hearing loss.           Frederick Carvajal is a 74 year old, presenting for the following:  AWV, Lipids, Thyroid Disease, and Hypertension        4/5/2024     6:55 AM   Additional Questions   Roomed by omero garcia   Accompanied by wife         4/5/2024     6:55 AM   Patient Reported Additional Medications   Patient reports taking the following new medications none        Health Care Directive  Patient does not have a Health Care Directive or Living Will:   Patient brought copy in today    HPI      Chief Complaint   Patient presents with    AWV    Lipids    Thyroid Disease    Hypertension     Hyperlipidemia Follow-Up    Are you regularly taking any medication or supplement to lower your cholesterol?   Yes- PM  Are you having muscle aches or other side effects that you think could be caused by your cholesterol lowering medication?  " No    Hypertension Follow-up    Do you check your blood pressure regularly outside of the clinic? No   Are you following a low salt diet? Yes  Are your blood pressures ever more than 140 on the top number (systolic) OR more than 90 on the bottom number (diastolic), for example 140/90? No    Hypothyroidism Follow-up    Since last visit, patient describes the following symptoms: Weight stable, no hair loss, no skin changes, no constipation, no loose stools        4/2/2024   General Health   How would you rate your overall physical health? Good   Feel stress (tense, anxious, or unable to sleep) Not at all         4/2/2024   Nutrition   Diet: Regular (no restrictions)         4/2/2024   Exercise   Days per week of moderate/strenous exercise 7 days   Average minutes spent exercising at this level 30 min         4/2/2024   Social Factors   Frequency of gathering with friends or relatives Three times a week   Worry food won't last until get money to buy more No   Food not last or not have enough money for food? No   Do you have housing?  Yes   Are you worried about losing your housing? No   Lack of transportation? No   Unable to get utilities (heat,electricity)? No         4/2/2024   Fall Risk   Fallen 2 or more times in the past year? No   Trouble with walking or balance? No          4/2/2024   Activities of Daily Living- Home Safety   Needs help with the following daily activites None of the above   Safety concerns in the home None of the above         4/2/2024   Dental   Dentist two times every year? Yes         4/2/2024   Hearing Screening   Hearing concerns? (!) TROUBLE UNDERSTANDING SOFT OR WHISPERED SPEECH.         4/2/2024   Driving Risk Screening   Patient/family members have concerns about driving No         4/2/2024   General Alertness/Fatigue Screening   Have you been more tired than usual lately? No         4/2/2024   Urinary Incontinence Screening   Bothered by leaking urine in past 6 months No          3/23/2024   TB Screening   Were you born outside of the [place]? No         Today's PHQ-2 Score:       2024     6:42 AM   PHQ-2 (  [employer])   Q1: Little interest or pleasure in doing things 0   Q2: Feeling down, depressed or hopeless 0   PHQ-2 Score 0   Q1: Little interest or pleasure in doing things Not at all   Q2: Feeling down, depressed or hopeless Not at all   PHQ-2 Score 0           2024   Substance Use   Alcohol more than 3/day or more than 7/wk No   Do you have a current opioid prescription? No   How severe/bad is pain from 1 to 10? 4/10   Do you use any other substances recreationally? No     Social History     Tobacco Use    Smoking status: Former     Packs/day: 0.50     Years: 5.00     Additional pack years: 0.00     Total pack years: 2.50     Types: Cigarettes     Start date: 1955     Quit date: 1960     Years since quittin.2    Smokeless tobacco: Never   Vaping Use    Vaping Use: Never used   Substance Use Topics    Alcohol use: Yes     Comment: occasional    Drug use: No       ASCVD Risk   The 10-year ASCVD risk score (Massimo MOE, et al., 2019) is: 21.7%    Values used to calculate the score:      Age: 74 years      Sex: Male      Is Non- : No      Diabetic: No      Tobacco smoker: No      Systolic Blood Pressure: 118 mmHg      Is BP treated: Yes      HDL Cholesterol: 60 mg/dL      Total Cholesterol: 150 mg/dL            Reviewed and updated as needed this visit by Provider                    Current Outpatient Medications   Medication Sig Dispense Refill    allopurinol (ZYLOPRIM) 300 MG tablet Take 1 tablet (300 mg) by mouth daily 90 tablet 4    atorvastatin (LIPITOR) 20 MG tablet Take 1 tablet (20 mg) by mouth daily 90 tablet 3    cyclobenzaprine (FLEXERIL) 10 MG tablet Take 1 tablet (10 mg) by mouth 3 times daily as needed for muscle spasms 30 tablet 0    levothyroxine (SYNTHROID/LEVOTHROID) 137 MCG tablet TAKE 1 TABLET (137 MCG) BY MOUTH DAILY 90  tablet 4    metoprolol tartrate (LOPRESSOR) 25 MG tablet Take 0.5 tablets (12.5 mg) by mouth 2 times daily 90 tablet 4    order for DME Equipment being ordered: Cpap, mask, tubing, and all associated supplies 1 each 0    sildenafil (REVATIO) 20 MG tablet Take 1-5 tablets daily as needed, 30-45 minutes prior to sexual activity 90 tablet 3    telmisartan (MICARDIS) 80 MG tablet Take 1 tablet (80 mg) by mouth daily 90 tablet 3    vitamin C (ASCORBIC ACID) 1000 MG TABS Take 1,000 mg by mouth daily       Current providers sharing in care for this patient include:  Patient Care Team:  Mini Gillis DO as PCP - General (Internal Medicine)  Mini Gillis DO as Assigned PCP  Althea Guerra PA-C as Physician Assistant (Urology)  Althea Guerra PA-C as Assigned Surgical Provider    The following health maintenance items are reviewed in Epic and correct as of today:  Health Maintenance   Topic Date Due    URINALYSIS  Never done    COLORECTAL CANCER SCREENING  06/30/2023    BMP  03/21/2024    LIPID  03/21/2024    MICROALBUMIN  03/21/2024    ANNUAL REVIEW OF HM ORDERS  03/21/2024    CBC  03/21/2024    URIC ACID  03/21/2024    MEDICARE ANNUAL WELLNESS VISIT  03/21/2024    TSH W/FREE T4 REFLEX  03/21/2024    HEMOGLOBIN  03/21/2024    DTAP/TDAP/TD IMMUNIZATION (1 - Tdap) 04/04/2025 (Originally 12/10/2019)    RSV VACCINE (Pregnancy & 60+) (1 - 1-dose 60+ series) 04/05/2025 (Originally 2/5/2009)    FALL RISK ASSESSMENT  04/05/2025    GLUCOSE  03/21/2026    ADVANCE CARE PLANNING  03/22/2028    PHQ-2 (once per calendar year)  Completed    INFLUENZA VACCINE  Completed    Pneumococcal Vaccine: 65+ Years  Completed    ZOSTER IMMUNIZATION  Completed    AORTIC ANEURYSM SCREENING (SYSTEM ASSIGNED)  Completed    COVID-19 Vaccine  Completed    HEPATITIS C SCREENING  Addressed    IPV IMMUNIZATION  Aged Out    HPV IMMUNIZATION  Aged Out    MENINGITIS IMMUNIZATION  Aged Out    RSV MONOCLONAL ANTIBODY  Aged Out         Review of  "Systems  Constitutional, neuro, ENT, endocrine, pulmonary, cardiac, gastrointestinal, genitourinary, musculoskeletal, integument and psychiatric systems are negative, except as otherwise noted.     Objective    Exam  /70 (BP Location: Right arm, Patient Position: Sitting, Cuff Size: Adult Large)   Pulse 63   Temp 97  F (36.1  C) (Tympanic)   Resp 12   Ht 1.803 m (5' 11\")   Wt 134.4 kg (296 lb 4.8 oz)   SpO2 93%   BMI 41.33 kg/m     Estimated body mass index is 41.33 kg/m  as calculated from the following:    Height as of this encounter: 1.803 m (5' 11\").    Weight as of this encounter: 134.4 kg (296 lb 4.8 oz).    Physical Exam  GENERAL: alert and no distress  EYES: Eyes grossly normal to inspection, PERRL and conjunctivae and sclerae normal  HENT: ear canals and TM's normal, nose and mouth without ulcers or lesions  NECK: no adenopathy, no asymmetry, masses, or scars  RESP: lungs clear to auscultation - no rales, rhonchi or wheezes  CV: regular rate and rhythm, normal S1 S2, no S3 or S4, no murmur, click or rub, no peripheral edema  ABDOMEN: soft, nontender, no hepatosplenomegaly, no masses and bowel sounds normal  SKIN: no suspicious lesions or rashes  NEURO: Normal strength and tone, mentation intact and speech normal  PSYCH: mentation appears normal, affect normal/bright         4/5/2024   Mini Cog   Clock Draw Score 0 Abnormal   3 Item Recall 3 objects recalled   Mini Cog Total Score 3         Vision Screen  Vision Screen Results: (!) REFER  No Corrective Lenses, PLUS LENS REQUIRED: REFER      Signed Electronically by: Mini Gillis DO    "
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
## 2024-04-05 NOTE — RESULT ENCOUNTER NOTE
The kidney function is mildly low, similar to previous values.  Electrolytes are normal.  Platelet count is mildly low but similar to previous values.  Other blood counts are normal.  Urine is negative for protein.  Thyroid, cholesterol, uric acid are normal.

## 2024-04-05 NOTE — LETTER
April 9, 2024      Wei HAKEEM Kline  [address] [place] [address]        Dear ,    We are writing to inform you of your test results.    The kidney function is mildly low, similar to previous values.  Electrolytes are normal.  Platelet count is mildly low but similar to previous values.  Other blood counts are normal.  Urine is negative for protein.  Thyroid, cholesterol, uric acid are normal.     Resulted Orders   TSH with free T4 reflex   Result Value Ref Range    TSH 1.53 0.30 - 4.20 uIU/mL   Albumin Random Urine Quantitative with Creat Ratio   Result Value Ref Range    Creatinine Urine mg/dL 92.2 mg/dL      Comment:      The reference ranges have not been established in urine creatinine. The results should be integrated into the clinical context for interpretation.    Albumin Urine mg/L <12.0 mg/L      Comment:      The reference ranges have not been established in urine albumin. The results should be integrated into the clinical context for interpretation.    Albumin Urine mg/g Cr        Comment:      Unable to calculate, urine albumin and/or urine creatinine is outside detectable limits.  Microalbuminuria is defined as an albumin:creatinine ratio of 17 to 299 for males and 25 to 299 for females. A ratio of albumin:creatinine of 300 or higher is indicative of overt proteinuria.  Due to biologic variability, positive results should be confirmed by a second, first-morning random or 24-hour timed urine specimen. If there is discrepancy, a third specimen is recommended. When 2 out of 3 results are in the microalbuminuria range, this is evidence for incipient nephropathy and warrants increased efforts at glucose control, blood pressure control, and institution of therapy with an angiotensin-converting-enzyme (ACE) inhibitor (if the patient can tolerate it).     Lipid panel reflex to direct LDL Fasting   Result Value Ref Range    Cholesterol 155 <200 mg/dL    Triglycerides 127 <150 mg/dL    Direct Measure  HDL 58 >=40 mg/dL    LDL Cholesterol Calculated 72 <=100 mg/dL    Non HDL Cholesterol 97 <130 mg/dL    Patient Fasting > 8hrs? Yes     Narrative    Cholesterol  Desirable:  <200 mg/dL    Triglycerides  Normal:  Less than 150 mg/dL  Borderline High:  150-199 mg/dL  High:  200-499 mg/dL  Very High:  Greater than or equal to 500 mg/dL    Direct Measure HDL  Female:  Greater than or equal to 50 mg/dL   Male:  Greater than or equal to 40 mg/dL    LDL Cholesterol  Desirable:  <100mg/dL  Above Desirable:  100-129 mg/dL   Borderline High:  130-159 mg/dL   High:  160-189 mg/dL   Very High:  >= 190 mg/dL    Non HDL Cholesterol  Desirable:  130 mg/dL  Above Desirable:  130-159 mg/dL  Borderline High:  160-189 mg/dL  High:  190-219 mg/dL  Very High:  Greater than or equal to 220 mg/dL   UA Macroscopic with reflex to Microscopic and Culture   Result Value Ref Range    Color Urine Yellow Colorless, Straw, Light Yellow, Yellow    Appearance Urine Clear Clear    Glucose Urine Negative Negative mg/dL    Bilirubin Urine Negative Negative    Ketones Urine Negative Negative mg/dL    Specific Gravity Urine 1.020 1.003 - 1.035    Blood Urine Negative Negative    pH Urine 5.5 5.0 - 7.0    Protein Albumin Urine Negative Negative mg/dL    Urobilinogen Urine 0.2 0.2, 1.0 E.U./dL    Nitrite Urine Negative Negative    Leukocyte Esterase Urine Negative Negative    Narrative    Microscopic not indicated   Uric acid   Result Value Ref Range    Uric Acid 6.3 3.4 - 7.0 mg/dL   Basic metabolic panel   Result Value Ref Range    Sodium 141 135 - 145 mmol/L      Comment:      Reference intervals for this test were updated on 09/26/2023 to more accurately reflect our healthy population. There may be differences in the flagging of prior results with similar values performed with this method. Interpretation of those prior results can be made in the context of the updated reference intervals.     Potassium 5.1 3.4 - 5.3 mmol/L      Comment:      Specimen  slightly hemolyzed. The reported potassium value may be falsely elevated. Analysis of a non-hemolyzed specimen (i.e. re-draw) may result in a lower potassium value.    Chloride 108 (H) 98 - 107 mmol/L    Carbon Dioxide (CO2) 27 22 - 29 mmol/L    Anion Gap 6 (L) 7 - 15 mmol/L    Urea Nitrogen 23.2 (H) 8.0 - 23.0 mg/dL    Creatinine 1.38 (H) 0.67 - 1.17 mg/dL    GFR Estimate 53 (L) >60 mL/min/1.73m2    Calcium 10.4 (H) 8.8 - 10.2 mg/dL    Glucose 100 (H) 70 - 99 mg/dL   CBC with platelets   Result Value Ref Range    WBC Count 7.0 4.0 - 11.0 10e3/uL    RBC Count 4.94 4.40 - 5.90 10e6/uL    Hemoglobin 15.2 13.3 - 17.7 g/dL    Hematocrit 46.8 40.0 - 53.0 %    MCV 95 78 - 100 fL    MCH 30.8 26.5 - 33.0 pg    MCHC 32.5 31.5 - 36.5 g/dL    RDW 13.4 10.0 - 15.0 %    Platelet Count 137 (L) 150 - 450 10e3/uL       If you have any questions or concerns, please call the clinic at the number listed above.       Sincerely,      Mini Gillis, DO

## 2024-04-05 NOTE — PATIENT INSTRUCTIONS
Preventive Care Advice   This is general advice given by our system to help you stay healthy. However, your care team may have specific advice just for you. Please talk to your care team about your preventive care needs.  Nutrition  Eat 5 or more servings of fruits and vegetables each day.  Try wheat bread, brown rice and whole grain pasta (instead of white bread, rice, and pasta).  Get enough calcium and vitamin D. Check the label on foods and aim for 100% of the RDA (recommended daily allowance).  Lifestyle  Exercise at least 150 minutes each week   (30 minutes a day, 5 days a week).  Do muscle strengthening activities 2 days a week. These help control your weight and prevent disease.  No smoking.  Wear sunscreen to prevent skin cancer.  Have a dental exam and cleaning every 6 months.  Yearly exams  See your health care team every year to talk about:  Any changes in your health.  Any medicines your care team has prescribed.  Preventive care, family planning, and ways to prevent chronic diseases.  Shots (vaccines)   HPV shots (up to age 26), if you've never had them before.  Hepatitis B shots (up to age 59), if you've never had them before.  COVID-19 shot: Get this shot when it's due.  Flu shot: Get a flu shot every year.  Tetanus shot: Get a tetanus shot every 10 years.  Pneumococcal, hepatitis A, and RSV shots: Ask your care team if you need these based on your risk.  Shingles shot (for age 50 and up).  General health tests  Diabetes screening:  Starting at age 35, Get screened for diabetes at least every 3 years.  If you are younger than age 35, ask your care team if you should be screened for diabetes.  Cholesterol test: At age 39, start having a cholesterol test every 5 years, or more often if advised.  Bone density scan (DEXA): At age 50, ask your care team if you should have this scan for osteoporosis (brittle bones).  Hepatitis C: Get tested at least once in your life.  STIs (sexually transmitted  infections)  Before age 24: Ask your care team if you should be screened for STIs.  After age 24: Get screened for STIs if you're at risk. You are at risk for STIs (including HIV) if:  You are sexually active with more than one person.  You don't use condoms every time.  You or a partner was diagnosed with a sexually transmitted infection.  If you are at risk for HIV, ask about PrEP medicine to prevent HIV.  Get tested for HIV at least once in your life, whether you are at risk for HIV or not.  Cancer screening tests  Cervical cancer screening: If you have a cervix, begin getting regular cervical cancer screening tests at age 21. Most people who have regular screenings with normal results can stop after age 65. Talk about this with your provider.  Breast cancer scan (mammogram): If you've ever had breasts, begin having regular mammograms starting at age 40. This is a scan to check for breast cancer.  Colon cancer screening: It is important to start screening for colon cancer at age 45.  Have a colonoscopy test every 10 years (or more often if you're at risk) Or, ask your provider about stool tests like a FIT test every year or Cologuard test every 3 years.  To learn more about your testing options, visit: https://www.[employer] ([employer])/419532.pdf.  For help making a decision, visit: https://bit.ly/dw45073.  Prostate cancer screening test: If you have a prostate and are age 55 to 69, ask your provider if you would benefit from a yearly prostate cancer screening test.  Lung cancer screening: If you are a current or former smoker age 50 to 80, ask your care team if ongoing lung cancer screenings are right for you.  For informational purposes only. Not to replace the advice of your health care provider. Copyright   2023 [employer]. All rights reserved. Clinically reviewed by the [employer] [facility] Transitions Program. [employer] 230741 - REV 01/24.    Hearing Loss: Care Instructions  Overview     Hearing loss is a  sudden or slow decrease in how well you hear. It can range from slight to profound. Permanent hearing loss can occur with aging. It also can happen when you are exposed long-term to loud noise. Examples include listening to loud music, riding motorcycles, or being around other loud machines.  Hearing loss can affect your work and home life. It can make you feel lonely or depressed. You may feel that you have lost your independence. But hearing aids and other devices can help you hear better and feel connected to others.  Follow-up care is a key part of your treatment and safety. Be sure to make and go to all appointments, and call your doctor if you are having problems. It's also a good idea to know your test results and keep a list of the medicines you take.  How can you care for yourself at home?  Avoid loud noises whenever possible. This helps keep your hearing from getting worse.  Always wear hearing protection around loud noises.  Wear a hearing aid as directed.  A professional can help you pick a hearing aid that will work best for you.  You can also get hearing aids over the counter for mild to moderate hearing loss.  Have hearing tests as your doctor suggests. They can show whether your hearing has changed. Your hearing aid may need to be adjusted.  Use other devices as needed. These may include:  Telephone amplifiers and hearing aids that can connect to a television, stereo, radio, or microphone.  Devices that use lights or vibrations. These alert you to the doorbell, a ringing telephone, or a baby monitor.  Television closed-captioning. This shows the words at the bottom of the screen. Most new TVs can do this.  TTY (text telephone). This lets you type messages back and forth on the telephone instead of talking or listening. These devices are also called TDD. When messages are typed on the keyboard, they are sent over the phone line to a receiving TTY. The message is shown on a monitor.  Use text  "messaging, social media, and email if it is hard for you to communicate by telephone.  Try to learn a listening technique called speechreading. It is not lipreading. You pay attention to people's gestures, expressions, posture, and tone of voice. These clues can help you understand what a person is saying. Face the person you are talking to, and have them face you. Make sure the lighting is good. You need to see the other person's face clearly.  Think about counseling if you need help to adjust to your hearing loss.  When should you call for help?  Watch closely for changes in your health, and be sure to contact your doctor if:    You think your hearing is getting worse.     You have new symptoms, such as dizziness or nausea.   Where can you learn more?  Go to https://www.[employer].net/patiented  Enter R798 in the search box to learn more about \"Hearing Loss: Care Instructions.\"  Current as of: September 27, 2023               Content Version: 14.0    6147-4941 [employer].   Care instructions adapted under license by your healthcare professional. If you have questions about a medical condition or this instruction, always ask your healthcare professional. [employer] disclaims any warranty or liability for your use of this information.        Health Care Maintenance  You are due for a colonoscopy.  Please call [phone] to schedule  Recommend tetanus and RSV    Hypertension  Hyperlipidemia  Gout  Refills sent  Blood and urine test today and in 1 year    Joint pain  Can try Glucosamine + Chondroitin.  Would need 3 month trial    Weight  Start phentermine 15 mg once daily  Follow-up in 2 months.  Discussed diet and exercise          "

## 2024-04-15 ENCOUNTER — DOCUMENTATION ONLY (OUTPATIENT)
Dept: OTHER | Facility: CLINIC | Age: 74
End: 2024-04-15
Payer: COMMERCIAL

## 2024-04-25 ENCOUNTER — TRANSFERRED RECORDS (OUTPATIENT)
Dept: HEALTH INFORMATION MANAGEMENT | Facility: CLINIC | Age: 74
End: 2024-04-25
Payer: COMMERCIAL

## 2024-05-08 ENCOUNTER — OFFICE VISIT (OUTPATIENT)
Dept: FAMILY MEDICINE | Facility: CLINIC | Age: 74
End: 2024-05-08
Payer: COMMERCIAL

## 2024-05-08 ENCOUNTER — ANCILLARY PROCEDURE (OUTPATIENT)
Dept: GENERAL RADIOLOGY | Facility: CLINIC | Age: 74
End: 2024-05-08
Attending: INTERNAL MEDICINE
Payer: COMMERCIAL

## 2024-05-08 VITALS
DIASTOLIC BLOOD PRESSURE: 80 MMHG | TEMPERATURE: 97.3 F | HEART RATE: 74 BPM | OXYGEN SATURATION: 93 % | HEIGHT: 71 IN | WEIGHT: 279.1 LBS | SYSTOLIC BLOOD PRESSURE: 130 MMHG | RESPIRATION RATE: 12 BRPM | BODY MASS INDEX: 39.07 KG/M2

## 2024-05-08 DIAGNOSIS — M25.532 LEFT WRIST PAIN: ICD-10-CM

## 2024-05-08 DIAGNOSIS — M25.532 LEFT WRIST PAIN: Primary | ICD-10-CM

## 2024-05-08 PROCEDURE — 73110 X-RAY EXAM OF WRIST: CPT | Mod: TC | Performed by: RADIOLOGY

## 2024-05-08 PROCEDURE — 99213 OFFICE O/P EST LOW 20 MIN: CPT | Performed by: INTERNAL MEDICINE

## 2024-05-08 ASSESSMENT — PAIN SCALES - GENERAL: PAINLEVEL: WORST PAIN (10)

## 2024-05-08 NOTE — PATIENT INSTRUCTIONS
Xray today  Possible causes - fracture, osteoarthritis, gout, sprain/strain  If no fracture or osteoarthritis is seen, then would treat with short course of Ibuprofen 600 mg three times daily x 4 days (take with food), PLUS Tylenol 1000 mg 3 x day  If sprain, important treatment is time and modified activity/rest

## 2024-05-08 NOTE — PROGRESS NOTES
Assessment & Plan     Left wrist pain  Favor sprain.  OA, gout, fracture is also possible.  Get x-ray today to evaluate for OA versus crystal arthritis versus fracture.  If x-ray is unrevealing, likely sprain.  Recommend NSAIDs, Tylenol, ice, modified activity, brace  - XR Wrist Left G/E 3 Views; Future              Patient Instructions   Xray today  Possible causes - fracture, osteoarthritis, gout, sprain/strain  If no fracture or osteoarthritis is seen, then would treat with short course of Ibuprofen 600 mg three times daily x 4 days (take with food), PLUS Tylenol 1000 mg 3 x day  If sprain, important treatment is time and modified activity/rest    Subjective   Wei is a 74 year old, presenting for the following health issues:  Pain and Health Maintenance (No shot today )        5/8/2024     9:36 AM   Additional Questions   Roomed by omero   Accompanied by self         5/8/2024     9:36 AM   Patient Reported Additional Medications   Patient reports taking the following new medications none     History of Present Illness       Reason for visit:  Injured left wrist  Symptom onset:  1-3 days ago  Symptoms include:  Pain  Symptom intensity:  Severe  Symptom progression:  Worsening  Had these symptoms before:  No  What makes it worse:  Twisting wrist  What makes it better:  Immobile    He eats 2-3 servings of fruits and vegetables daily.He consumes 1 sweetened beverage(s) daily.He exercises with enough effort to increase his heart rate 30 to 60 minutes per day.  He exercises with enough effort to increase his heart rate 7 days per week.   He is taking medications regularly.       Chief Complaint   Patient presents with    Pain    Health Maintenance     No shot today          Pain History:  When did you first notice your pain? One day   Have you seen anyone else for your pain? No  How has your pain affected your ability to work? Not applicable  Where in your body do you have pain? Musculoskeletal  problem/pain  Onset/Duration: one day  Description  Location: wrist - left  Joint Swelling: No  Redness: No  Pain: YES  Warmth: No  Intensity:  10/10 at worse  Progression of Symptoms:  same  Accompanying signs and symptoms:   Fevers: No  Numbness/tingling/weakness: No  History  Trauma to the area: No  Recent illness:  No  Previous similar problem: No  Previous evaluation:  No  Precipitating or alleviating factors:  Aggravating factors include: none  Therapies tried and outcome: nothing    --was throwing hay with hayfork yesterday and in a twisting flexion motion, he heard a crack and developed acute onset of pain in the left wrist  --pain is ulnar dorsum of hand   --minimal pain at rest, pain with supination/pronation, gripping/holding objects, even his phone  --had to use hayfork with right hand only today  --no swelling, redness of hand      Current Outpatient Medications   Medication Sig Dispense Refill    allopurinol (ZYLOPRIM) 300 MG tablet Take 1 tablet (300 mg) by mouth daily 90 tablet 4    atorvastatin (LIPITOR) 20 MG tablet Take 1 tablet (20 mg) by mouth daily 90 tablet 3    cyclobenzaprine (FLEXERIL) 10 MG tablet Take 1 tablet (10 mg) by mouth 3 times daily as needed for muscle spasms 30 tablet 0    levothyroxine (SYNTHROID/LEVOTHROID) 137 MCG tablet TAKE 1 TABLET (137 MCG) BY MOUTH DAILY 90 tablet 4    metoprolol tartrate (LOPRESSOR) 25 MG tablet Take 0.5 tablets (12.5 mg) by mouth 2 times daily 90 tablet 4    order for DME Equipment being ordered: Cpap, mask, tubing, and all associated supplies 1 each 0    phentermine 15 MG capsule Take 1 capsule (15 mg) by mouth every morning 30 capsule 2    sildenafil (REVATIO) 20 MG tablet Take 1-5 tablets daily as needed, 30-45 minutes prior to sexual activity 90 tablet 3    telmisartan (MICARDIS) 80 MG tablet Take 1 tablet (80 mg) by mouth daily 90 tablet 3    vitamin C (ASCORBIC ACID) 1000 MG TABS Take 1,000 mg by mouth daily             Review of  "Systems  CONSTITUTIONAL: NEGATIVE for fever, chills, change in weight  ENT/MOUTH: NEGATIVE for ear, mouth and throat problems  RESP: NEGATIVE for significant cough or SOB  CV: NEGATIVE for chest pain, palpitations or peripheral edema      Objective    /80 (BP Location: Right arm, Patient Position: Sitting, Cuff Size: Adult Large)   Pulse 74   Temp 97.3  F (36.3  C) (Tympanic)   Resp 12   Ht 1.803 m (5' 11\")   Wt 126.6 kg (279 lb 1.6 oz)   SpO2 93%   BMI 38.93 kg/m    Body mass index is 38.93 kg/m .  Physical Exam   GENERAL: alert and no distress  MS: Mild pain with palpation at the ulnar styloid.  No pain with palpation of the remainder of the hand, wrist, forearm, elbow moderately painful passive and active supination, pronation.  Mildly painful active and passive wrist flexion/extension, radial, ulnar deviation.  SKIN: no suspicious lesions or rashes.  No erythema, warmth, swelling of painful affected area            Signed Electronically by: Mini Gillis DO    "

## 2024-05-09 ENCOUNTER — TELEPHONE (OUTPATIENT)
Dept: FAMILY MEDICINE | Facility: CLINIC | Age: 74
End: 2024-05-09
Payer: COMMERCIAL

## 2024-05-09 DIAGNOSIS — S63.502A COMPLETE TEAR OF LIGAMENT OF WRIST, LEFT, INITIAL ENCOUNTER: Primary | ICD-10-CM

## 2024-05-09 NOTE — TELEPHONE ENCOUNTER
General Call      Reason for Call:  Wife calling asking that pts xray report from yesterday be sent to Dr Tylor Story's office  in [place] as patient has appt there tomorrow with Dr Story.  Faxed report to [phone].  Left message for this office that report was faxed and to call back if this wasn't correct fax number.        Verna Gomez on 5/9/2024 at 2:40 PM

## 2024-05-09 NOTE — RESULT ENCOUNTER NOTE
There is evidence of a ligament tear of the wrist.  Referral placed to hand surgeon.  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

## 2024-05-10 ENCOUNTER — TRANSFERRED RECORDS (OUTPATIENT)
Dept: HEALTH INFORMATION MANAGEMENT | Facility: CLINIC | Age: 74
End: 2024-05-10
Payer: COMMERCIAL

## 2024-05-16 ENCOUNTER — TRANSFERRED RECORDS (OUTPATIENT)
Dept: HEALTH INFORMATION MANAGEMENT | Facility: CLINIC | Age: 74
End: 2024-05-16
Payer: COMMERCIAL

## 2024-05-21 ENCOUNTER — ANESTHESIA EVENT (OUTPATIENT)
Dept: GASTROENTEROLOGY | Facility: CLINIC | Age: 74
End: 2024-05-21
Payer: COMMERCIAL

## 2024-05-21 NOTE — ANESTHESIA PREPROCEDURE EVALUATION
Anesthesia Pre-Procedure Evaluation    Patient: Wei Kline   MRN: [id number] : 1950        Procedure : Procedure(s):  Colonoscopy          Past Medical History:   Diagnosis Date    Gout, unspecified 2015    Diagnosed in  at [facility], no flares since starting allopurinol.    History of cardiomyopathy 2015    Had angiogram in - clean arteries.  Thought to be viral.  Mild decrease in EF initially but last echo in  was normal.    History of Lyme disease 2015    Hyperlipidemia LDL goal < 100 2015    Hypertension goal BP (blood pressure) < 140/90 2015    Hypothyroidism 2015    Kidney stone     Morbid obesity (H)     LILLY (obstructive sleep apnea)     Umbilical hernia without obstruction and without gangrene       Past Surgical History:   Procedure Laterality Date    arthroscopic knee surgery      bilateral knees (no replacement)    ARTHROSCOPY ANKLE, OPEN REPAIR LIGAMENT, COMBINED Left 2019    Procedure: Left Ankle: arthroscopy - eval and debridement; cartilage repair procedures; lateral ankle ligament repair; gastroc recession;  Surgeon: Sandro Castro DPM;  Location: [facility]    COLONOSCOPY N/A 2020    Procedure: COLONOSCOPY, WITH POLYPECTOMY AND BIOPSY;  Surgeon: Wes Dowd MD;  Location: [facility]    TONSILLECTOMY        Allergies   Allergen Reactions    Nka [No Known Allergies]       Social History     Tobacco Use    Smoking status: Former     Current packs/day: 0.00     Average packs/day: 0.5 packs/day for 5.0 years (2.5 ttl pk-yrs)     Types: Cigarettes     Start date: 1955     Quit date: 1960     Years since quittin.3    Smokeless tobacco: Never   Substance Use Topics    Alcohol use: Yes     Comment: occasional      Wt Readings from Last 1 Encounters:   24 126.6 kg (279 lb 1.6 oz)        Anesthesia Evaluation   Pt has had prior anesthetic. Type: MAC and General.        ROS/MED HX  ENT/Pulmonary:     (+) sleep apnea,  "uses CPAP,                                      Neurologic:  - neg neurologic ROS     Cardiovascular: Comment: cardiomyopathy    (+) Dyslipidemia hypertension- -   -  - -                                      METS/Exercise Tolerance:     Hematologic:  - neg hematologic  ROS     Musculoskeletal:       GI/Hepatic:     (+)        bowel prep,            Renal/Genitourinary:     (+) renal disease, type: CRI,     Nephrolithiasis ,       Endo: Comment: Gout  Morbid obesity      (+)          thyroid problem, hypothyroidism,    Obesity,       Psychiatric/Substance Use:  - neg psychiatric ROS     Infectious Disease:  - neg infectious disease ROS     Malignancy:  - neg malignancy ROS     Other:  - neg other ROS    (+)  , H/O Chronic Pain,         Physical Exam    Airway        Mallampati: III   TM distance: > 3 FB   Neck ROM: full   Mouth opening: > 3 cm    Respiratory Devices and Support         Dental       (+) Minor Abnormalities - some fillings, tiny chips      Cardiovascular   cardiovascular exam normal          Pulmonary   pulmonary exam normal                OUTSIDE LABS:  CBC:   Lab Results   Component Value Date    WBC 7.0 04/05/2024    WBC 8.3 03/21/2023    HGB 15.2 04/05/2024    HGB 14.9 03/21/2023    HCT 46.8 04/05/2024    HCT 45.4 03/21/2023     (L) 04/05/2024     (L) 03/21/2023     BMP:   Lab Results   Component Value Date     04/05/2024     03/21/2023    POTASSIUM 5.1 04/05/2024    POTASSIUM 4.7 03/21/2023    CHLORIDE 108 (H) 04/05/2024    CHLORIDE 107 03/21/2023    CO2 27 04/05/2024    CO2 22 03/21/2023    BUN 23.2 (H) 04/05/2024    BUN 25.5 (H) 03/21/2023    CR 1.38 (H) 04/05/2024    CR 1.33 (H) 03/21/2023     (H) 04/05/2024    GLC 85 03/21/2023     COAGS: No results found for: \"PTT\", \"INR\", \"FIBR\"  POC: No results found for: \"BGM\", \"HCG\", \"HCGS\"  HEPATIC:   Lab Results   Component Value Date    ALBUMIN 4.1 03/21/2023    PROTTOTAL 7.5 03/21/2023    ALT 29 03/21/2023    AST 39 " "03/21/2023    ALKPHOS 95 03/21/2023    BILITOTAL 0.4 03/21/2023     OTHER:   Lab Results   Component Value Date    LACT 0.9 03/15/2022    A1C 5.4 03/21/2023    FLAVIA 10.4 (H) 04/05/2024    TSH 1.53 04/05/2024       Anesthesia Plan    ASA Status:  3    NPO Status:  NPO Appropriate    Anesthesia Type: General.     - Airway: Native airway   Induction: Propofol.           Consents    Anesthesia Plan(s) and associated risks, benefits, and realistic alternatives discussed. Questions answered and patient/representative(s) expressed understanding.     - Discussed: Risks, Benefits and Alternatives for BOTH SEDATION and the PROCEDURE were discussed     - Discussed with:  Patient      - Extended Intubation/Ventilatory Support Discussed: No.      - Patient is DNR/DNI Status: No     Use of blood products discussed: No .     Postoperative Care    Pain management: IV analgesics.   PONV prophylaxis: Background Propofol Infusion     Comments:               RUSSEL Kerr CRNA    I have reviewed the pertinent notes and labs in the chart from the past 30 days and (re)examined the patient.  Any updates or changes from those notes are reflected in this note.              # Obesity: Estimated body mass index is 38.93 kg/m  as calculated from the following:    Height as of 5/8/24: 1.803 m (5' 11\").    Weight as of 5/8/24: 126.6 kg (279 lb 1.6 oz).      "

## 2024-06-03 ENCOUNTER — ANESTHESIA (OUTPATIENT)
Dept: GASTROENTEROLOGY | Facility: CLINIC | Age: 74
End: 2024-06-03
Payer: COMMERCIAL

## 2024-06-03 ENCOUNTER — HOSPITAL ENCOUNTER (OUTPATIENT)
Facility: CLINIC | Age: 74
Discharge: HOME | End: 2024-06-03
Admitting: SURGERY
Payer: COMMERCIAL

## 2024-06-03 VITALS
DIASTOLIC BLOOD PRESSURE: 75 MMHG | HEIGHT: 71 IN | RESPIRATION RATE: 18 BRPM | WEIGHT: 280 LBS | BODY MASS INDEX: 39.2 KG/M2 | TEMPERATURE: 97.6 F | SYSTOLIC BLOOD PRESSURE: 129 MMHG | OXYGEN SATURATION: 96 % | HEART RATE: 66 BPM

## 2024-06-03 LAB — COLONOSCOPY: NORMAL

## 2024-06-03 PROCEDURE — 250N000011 HC RX IP 250 OP 636: Performed by: NURSE ANESTHETIST, CERTIFIED REGISTERED

## 2024-06-03 PROCEDURE — 45385 COLONOSCOPY W/LESION REMOVAL: CPT | Performed by: SURGERY

## 2024-06-03 PROCEDURE — 370N000017 HC ANESTHESIA TECHNICAL FEE, PER MIN: Performed by: SURGERY

## 2024-06-03 PROCEDURE — 258N000003 HC RX IP 258 OP 636: Performed by: SURGERY

## 2024-06-03 PROCEDURE — 88305 TISSUE EXAM BY PATHOLOGIST: CPT | Mod: TC | Performed by: SURGERY

## 2024-06-03 PROCEDURE — 250N000009 HC RX 250: Performed by: SURGERY

## 2024-06-03 PROCEDURE — 250N000009 HC RX 250: Performed by: NURSE ANESTHETIST, CERTIFIED REGISTERED

## 2024-06-03 RX ORDER — ONDANSETRON 2 MG/ML
4 INJECTION INTRAMUSCULAR; INTRAVENOUS
Status: DISCONTINUED | OUTPATIENT
Start: 2024-06-03 | End: 2024-06-03 | Stop reason: HOSPADM

## 2024-06-03 RX ORDER — ONDANSETRON 4 MG/1
4 TABLET, ORALLY DISINTEGRATING ORAL EVERY 30 MIN PRN
Status: DISCONTINUED | OUTPATIENT
Start: 2024-06-03 | End: 2024-06-03 | Stop reason: HOSPADM

## 2024-06-03 RX ORDER — ALBUTEROL SULFATE 0.83 MG/ML
2.5 SOLUTION RESPIRATORY (INHALATION) EVERY 4 HOURS PRN
Status: DISCONTINUED | OUTPATIENT
Start: 2024-06-03 | End: 2024-06-03 | Stop reason: HOSPADM

## 2024-06-03 RX ORDER — NALOXONE HYDROCHLORIDE 0.4 MG/ML
0.1 INJECTION, SOLUTION INTRAMUSCULAR; INTRAVENOUS; SUBCUTANEOUS
Status: DISCONTINUED | OUTPATIENT
Start: 2024-06-03 | End: 2024-06-03 | Stop reason: HOSPADM

## 2024-06-03 RX ORDER — SODIUM CHLORIDE, SODIUM LACTATE, POTASSIUM CHLORIDE, CALCIUM CHLORIDE 600; 310; 30; 20 MG/100ML; MG/100ML; MG/100ML; MG/100ML
INJECTION, SOLUTION INTRAVENOUS CONTINUOUS
Status: DISCONTINUED | OUTPATIENT
Start: 2024-06-03 | End: 2024-06-03 | Stop reason: HOSPADM

## 2024-06-03 RX ORDER — ONDANSETRON 2 MG/ML
4 INJECTION INTRAMUSCULAR; INTRAVENOUS EVERY 30 MIN PRN
Status: DISCONTINUED | OUTPATIENT
Start: 2024-06-03 | End: 2024-06-03 | Stop reason: HOSPADM

## 2024-06-03 RX ORDER — LIDOCAINE HYDROCHLORIDE 20 MG/ML
INJECTION, SOLUTION INFILTRATION; PERINEURAL PRN
Status: DISCONTINUED | OUTPATIENT
Start: 2024-06-03 | End: 2024-06-03

## 2024-06-03 RX ORDER — PROPOFOL 10 MG/ML
INJECTION, EMULSION INTRAVENOUS PRN
Status: DISCONTINUED | OUTPATIENT
Start: 2024-06-03 | End: 2024-06-03

## 2024-06-03 RX ORDER — LIDOCAINE 40 MG/G
CREAM TOPICAL
Status: DISCONTINUED | OUTPATIENT
Start: 2024-06-03 | End: 2024-06-03 | Stop reason: HOSPADM

## 2024-06-03 RX ORDER — DEXAMETHASONE SODIUM PHOSPHATE 4 MG/ML
4 INJECTION, SOLUTION INTRA-ARTICULAR; INTRALESIONAL; INTRAMUSCULAR; INTRAVENOUS; SOFT TISSUE
Status: DISCONTINUED | OUTPATIENT
Start: 2024-06-03 | End: 2024-06-03 | Stop reason: HOSPADM

## 2024-06-03 RX ORDER — PROPOFOL 10 MG/ML
INJECTION, EMULSION INTRAVENOUS CONTINUOUS PRN
Status: DISCONTINUED | OUTPATIENT
Start: 2024-06-03 | End: 2024-06-03

## 2024-06-03 RX ADMIN — SODIUM CHLORIDE, POTASSIUM CHLORIDE, SODIUM LACTATE AND CALCIUM CHLORIDE: 600; 310; 30; 20 INJECTION, SOLUTION INTRAVENOUS at 07:27

## 2024-06-03 RX ADMIN — LIDOCAINE HYDROCHLORIDE 0.2 ML: 10 INJECTION, SOLUTION EPIDURAL; INFILTRATION; INTRACAUDAL; PERINEURAL at 07:27

## 2024-06-03 RX ADMIN — PROPOFOL 50 MG: 10 INJECTION, EMULSION INTRAVENOUS at 07:51

## 2024-06-03 RX ADMIN — PROPOFOL 150 MCG/KG/MIN: 10 INJECTION, EMULSION INTRAVENOUS at 07:51

## 2024-06-03 RX ADMIN — LIDOCAINE HYDROCHLORIDE 100 MG: 20 INJECTION, SOLUTION INFILTRATION; PERINEURAL at 07:51

## 2024-06-03 ASSESSMENT — ACTIVITIES OF DAILY LIVING (ADL)
ADLS_ACUITY_SCORE: 35

## 2024-06-03 NOTE — H&P
General Surgery H&P  Wei Kline MRN# [id number]   Age/Sex: 74 year old male YOB: 1950     Reason for visit: Colonoscopy       Referring physician: Dr. Gillis                    Assessment and Plan:   Assessment:  colonoscopy    Plan:  -To the OR for colonoscopy  -Risk and benefits of procedure explained detail to the patient.  Risks include infection, bleeding, damage to the surrounding structures and possible perforation of the hollow organs.  Patient verbalized understanding provided consent to undergo the procedure above.          Chief Complaint:   Presenting for colonoscopy     History is obtained from the patient    HPI:   Wei Kline is a 74 year old male who presents for colonoscopy.  Patient tolerated the prep well.  The patient's last colonoscopy was 2020.  Family history shows brother with history of colon cancer.  No new complaints.           Past Medical History:     Past Medical History:   Diagnosis Date    Gout, unspecified 01/16/2015    Diagnosed in 2009 at [facility], no flares since starting allopurinol.    History of cardiomyopathy 01/16/2015    Had angiogram in 2002- clean arteries.  Thought to be viral.  Mild decrease in EF initially but last echo in 2014 was normal.    History of Lyme disease 01/16/2015    Hyperlipidemia LDL goal < 100 01/16/2015    Hypertension goal BP (blood pressure) < 140/90 01/16/2015    Hypothyroidism 01/16/2015    Kidney stone     Morbid obesity (H)     LILLY (obstructive sleep apnea)     Umbilical hernia without obstruction and without gangrene               Past Surgical History:     Past Surgical History:   Procedure Laterality Date    arthroscopic knee surgery  1990's    bilateral knees (no replacement)    ARTHROSCOPY ANKLE, OPEN REPAIR LIGAMENT, COMBINED Left 12/26/2019    Procedure: Left Ankle: arthroscopy - eval and debridement; cartilage repair procedures; lateral ankle ligament repair; gastroc recession;  Surgeon: Sandro Castro DPM;   Location: [facility]    COLONOSCOPY N/A 6/30/2020    Procedure: COLONOSCOPY, WITH POLYPECTOMY AND BIOPSY;  Surgeon: Wes Dowd MD;  Location: [facility]    TONSILLECTOMY               Social History:    reports that he quit smoking about 64 years ago. His smoking use included cigarettes. He started smoking about 69 years ago. He has a 2.5 pack-year smoking history. He has never used smokeless tobacco. He reports current alcohol use. He reports that he does not use drugs.           Family History:     Family History   Problem Relation Age of Onset    Breast Cancer Mother     Cancer Father     Cerebrovascular Disease Paternal Grandmother     Cancer - colorectal Paternal Grandfather     Cancer - colorectal Brother     Diabetes Brother     Peptic Ulcer Disease Brother     Lupus Sister     Hypertension Son     Diabetes Brother     Colon Cancer Brother     Hypertension Son               Allergies:     Allergies   Allergen Reactions    Nka [No Known Allergies]               Medications:     Prior to Admission medications    Medication Sig Start Date End Date Taking? Authorizing Provider   allopurinol (ZYLOPRIM) 300 MG tablet Take 1 tablet (300 mg) by mouth daily 4/5/24   Mini Gillis DO   atorvastatin (LIPITOR) 20 MG tablet Take 1 tablet (20 mg) by mouth daily 4/5/24   Mini Gillis DO   cyclobenzaprine (FLEXERIL) 10 MG tablet Take 1 tablet (10 mg) by mouth 3 times daily as needed for muscle spasms 7/31/23   Basia River PA-C   levothyroxine (SYNTHROID/LEVOTHROID) 137 MCG tablet TAKE 1 TABLET (137 MCG) BY MOUTH DAILY 4/5/24   Mini Gillis DO   metoprolol tartrate (LOPRESSOR) 25 MG tablet Take 0.5 tablets (12.5 mg) by mouth 2 times daily 4/5/24   Mini Gillis DO   order for DME Equipment being ordered: Cpap, mask, tubing, and all associated supplies 12/13/17   Geno Mcclelland MD   phentermine 15 MG capsule Take 1 capsule (15 mg) by mouth every morning 4/5/24   Mini Gillis DO  "  sildenafil (REVATIO) 20 MG tablet Take 1-5 tablets daily as needed, 30-45 minutes prior to sexual activity 1/30/24   Althea Guerra PA-C   telmisartan (MICARDIS) 80 MG tablet Take 1 tablet (80 mg) by mouth daily 4/5/24   Mini Gillis DO   vitamin C (ASCORBIC ACID) 1000 MG TABS Take 1,000 mg by mouth daily    Reported, Patient              Review of Systems:   A 12 point Review of Systems is negative other than noted in the HPI            Physical Exam:   Patient Vitals for the past 24 hrs:   BP Temp Resp SpO2 Height Weight   06/03/24 0649 130/77 98.3  F (36.8  C) 18 98 % 1.803 m (5' 11\") 127 kg (280 lb)        No intake or output data in the 24 hours ending 06/03/24 0712   Constitutional:   awake, alert, cooperative, no apparent distress, and appears stated age       Eyes:   PERRL, conjunctiva/corneas clear, EOM's intact; no scleral edema or icterus noted        ENT:   Normocephalic, without obvious abnormality, atraumatic, Lips, mucosa, and tongue normal        Hematologic / Lymphatic:   No lymphadenopathy       Lungs:   Normal respiratory effort, no accessory muscle use, breath sounds bilaterally on auscultation       Cardiovascular:   Regular rate and rhythm       Abdomen:   Soft, nondistended, nontender to palpation       Musculoskeletal:   No obvious swelling, bruising or deformity       Skin:   Skin color and texture normal for patient, no rashes or lesions              Data:          DO Vito Nelson DO  General Surgeon  [facility]  Surgery [address] [address]?  Office: [phone]  Employed by - [facility]  Pager: [phone]         "

## 2024-06-03 NOTE — ANESTHESIA POSTPROCEDURE EVALUATION
Patient: Wei Kline    Procedure: Procedure(s):  COLONOSCOPY, FLEXIBLE, WITH LESION REMOVAL USING SNARE       Anesthesia Type:  General    Note:  Disposition: Outpatient   Postop Pain Control: Uneventful            Sign Out: Well controlled pain   PONV: No   Neuro/Psych: Uneventful            Sign Out: Acceptable/Baseline neuro status   Airway/Respiratory: Uneventful            Sign Out: Acceptable/Baseline resp. status   CV/Hemodynamics: Uneventful            Sign Out: Acceptable CV status; No obvious hypovolemia; No obvious fluid overload   Other NRE: NONE   DID A NON-ROUTINE EVENT OCCUR? No           Last vitals:  Vitals Value Taken Time   /75 06/03/24 0900   Temp 36.4  C (97.6  F) 06/03/24 0840   Pulse 66 06/03/24 0900   Resp 18 06/03/24 0900   SpO2 95 % 06/03/24 0902   Vitals shown include unfiled device data.    Electronically Signed By: RUSSEL Crespo CRNA  Mary Lou 3, 2024  9:17 AM

## 2024-06-03 NOTE — DISCHARGE INSTRUCTIONS
"Learning About Colonoscopy  What is a colonoscopy?     A colonoscopy is a test (also called a procedure) that lets a doctor look inside your large intestine. The doctor uses a thin, lighted tube called a colonoscope. The doctor uses it to look for small growths called polyps, colon or rectal cancer (colorectal cancer), or other problems like bleeding.  During the procedure, the doctor can take samples of tissue. The samples can then be checked for cancer or other conditions. The doctor can also take out polyps.  How is a colonoscopy done?  This procedure is done in a doctor's office or a clinic or hospital. You will get medicine to help you relax and not feel pain. Some people find that they don't remember having the test because of the medicine.  The doctor gently moves the colonoscope, or scope, through the colon. The scope is also a small video camera. It lets the doctor see the colon and take pictures.  How do you prepare for the procedure?  You need to clean out your colon before the procedure so the doctor can see your colon. This depends on which \"colon prep\" your doctor recommends.  To clean out your colon, you'll do a \"colon prep\" before the test. This means you stop eating solid foods and drink only clear liquids. You can have water, tea, coffee, clear juices, clear broths, flavored ice pops, and gelatin (such as Jell-O). Do not drink anything red or purple.  The day or night before the procedure, you drink a large amount of a special liquid. This causes loose, frequent stools. You will go to the bathroom a lot. Your doctor may have you drink part of the liquid the evening before and the rest on the day of the test. It's very important to drink all of the liquid. If you have problems drinking it, call your doctor.  Arrange to have someone take you home after the test.  What can you expect after a colonoscopy?  Your doctor will tell you when you can eat and do your usual activities.  Drink a lot of fluid " "after the test to replace the fluids you may have lost during the colon prep. But don't drink alcohol.  Your doctor will talk to you about when you'll need your next colonoscopy. The results of your test and your risk for colorectal cancer will help your doctor decide how often you need to be checked.  After the test, you may be bloated or have gas pains. You may need to pass gas. If a biopsy was done or a polyp was removed, you may have streaks of blood in your stool (feces) for a few days. Check with your doctor to see when it is safe to take aspirin and nonsteroidal anti-inflammatory drugs (NSAIDs) again.  Problems such as heavy rectal bleeding may not occur until several weeks after the test. This isn't common. But it can happen after polyps are removed.  Follow-up care is a key part of your treatment and safety. Be sure to make and go to all appointments, and call your doctor if you are having problems. It's also a good idea to know your test results and keep a list of the medicines you take.  Where can you learn more?  Go to https://www.[employer].net/patiented  Enter Z368 in the search box to learn more about \"Learning About Colonoscopy.\"  Current as of: October 25, 2023               Content Version: 14.0    [id number] [employer].   Care instructions adapted under license by your healthcare professional. If you have questions about a medical condition or this instruction, always ask your healthcare professional. [employer] disclaims any warranty or liability for your use of this information.      "

## 2024-06-03 NOTE — ANESTHESIA CARE TRANSFER NOTE
Patient: Wei Kline    Procedure: Procedure(s):  COLONOSCOPY, FLEXIBLE, WITH LESION REMOVAL USING SNARE       Diagnosis: Screen for colon cancer [Z12.11]  Diagnosis Additional Information: No value filed.    Anesthesia Type:   General     Note:    Oropharynx: oropharynx clear of all foreign objects  Level of Consciousness: awake  Oxygen Supplementation: room air    Independent Airway: airway patency satisfactory and stable  Dentition: dentition unchanged  Vital Signs Stable: post-procedure vital signs reviewed and stable  Report to RN Given: handoff report given  Patient transferred to: Phase II    Handoff Report: Identifed the Patient, Identified the Reponsible Provider, Reviewed the pertinent medical history, Discussed the surgical course, Reviewed Intra-OP anesthesia mangement and issues during anesthesia, Set expectations for post-procedure period and Allowed opportunity for questions and acknowledgement of understanding      Vitals:  Vitals Value Taken Time   BP 90/67 06/03/24 0837   Temp 36.4  C (97.6  F) 06/03/24 0840   Pulse 73 06/03/24 0837   Resp     SpO2 93 % 06/03/24 0840   Vitals shown include unfiled device data.    Electronically Signed By: RUSSEL Crespo CRNA  Mary Lou 3, 2024  8:40 AM

## 2024-06-04 LAB
PATH REPORT.COMMENTS IMP SPEC: NORMAL
PATH REPORT.COMMENTS IMP SPEC: NORMAL
PATH REPORT.FINAL DX SPEC: NORMAL
PATH REPORT.GROSS SPEC: NORMAL
PATH REPORT.MICROSCOPIC SPEC OTHER STN: NORMAL
PATH REPORT.RELEVANT HX SPEC: NORMAL
PHOTO IMAGE: NORMAL

## 2024-06-04 PROCEDURE — 88305 TISSUE EXAM BY PATHOLOGIST: CPT | Mod: 26 | Performed by: PATHOLOGY

## 2024-06-05 NOTE — RESULT ENCOUNTER NOTE
Called patient with results. Your colonoscopy results show no cancer.  Due to the findings of the polyps, recommendation is to repeat colonoscopy in 3 years.      Vito Rice DO  General Surgeon  [facility]  Surgery [facility] - [address] [address]?  Office: [phone]  Employed by - [facility] [employer]  Pager: [phone]

## 2024-06-11 ENCOUNTER — OFFICE VISIT (OUTPATIENT)
Dept: FAMILY MEDICINE | Facility: CLINIC | Age: 74
End: 2024-06-11
Payer: COMMERCIAL

## 2024-06-11 VITALS
DIASTOLIC BLOOD PRESSURE: 68 MMHG | WEIGHT: 268.8 LBS | TEMPERATURE: 97.2 F | HEIGHT: 71 IN | HEART RATE: 73 BPM | BODY MASS INDEX: 37.63 KG/M2 | OXYGEN SATURATION: 93 % | SYSTOLIC BLOOD PRESSURE: 104 MMHG | RESPIRATION RATE: 22 BRPM

## 2024-06-11 DIAGNOSIS — E66.01 MORBID OBESITY (H): Primary | ICD-10-CM

## 2024-06-11 PROCEDURE — 99213 OFFICE O/P EST LOW 20 MIN: CPT | Performed by: INTERNAL MEDICINE

## 2024-06-11 PROCEDURE — G2211 COMPLEX E/M VISIT ADD ON: HCPCS | Performed by: INTERNAL MEDICINE

## 2024-06-11 RX ORDER — PHENTERMINE HYDROCHLORIDE 15 MG/1
15 CAPSULE ORAL EVERY MORNING
Qty: 90 CAPSULE | Refills: 1 | Status: SHIPPED | OUTPATIENT
Start: 2024-06-11 | End: 2024-08-12

## 2024-06-11 ASSESSMENT — PAIN SCALES - GENERAL: PAINLEVEL: NO PAIN (0)

## 2024-06-11 NOTE — PROGRESS NOTES
Assessment & Plan   Problem List Items Addressed This Visit       Morbid obesity (H) - Primary    Relevant Medications    phentermine 15 MG capsule             Patient Instructions   Continue phentermine 15 mg.  If you reach a plateau or find the appetite suppression diminishes over time, let me know via My Chart and I can increase 30 mg daily      Subjective   Wei is a 74 year old, presenting for the following health issues:  Weight Check (phentermine)        6/11/2024     7:38 AM   Additional Questions   Roomed by Faby ANGELO     History of Present Illness       Reason for visit:  Recheck on taking Phentermine    He eats 2-3 servings of fruits and vegetables daily.He consumes 2 sweetened beverage(s) daily.He exercises with enough effort to increase his heart rate 30 to 60 minutes per day.  He exercises with enough effort to increase his heart rate 7 days per week.   He is taking medications regularly.         Medication Followup of phentermine  Taking Medication as prescribed: yes  Side Effects:  None  Medication Helping Symptoms:  yes      Wt Readings from Last 5 Encounters:   06/11/24 121.9 kg (268 lb 12.8 oz)   06/03/24 127 kg (280 lb)   05/08/24 126.6 kg (279 lb 1.6 oz)   04/05/24 134.4 kg (296 lb 4.8 oz)   04/05/24 134.4 kg (296 lb 4.8 oz)         Current Outpatient Medications   Medication Sig Dispense Refill    allopurinol (ZYLOPRIM) 300 MG tablet Take 1 tablet (300 mg) by mouth daily 90 tablet 4    atorvastatin (LIPITOR) 20 MG tablet Take 1 tablet (20 mg) by mouth daily 90 tablet 3    levothyroxine (SYNTHROID/LEVOTHROID) 137 MCG tablet TAKE 1 TABLET (137 MCG) BY MOUTH DAILY 90 tablet 4    metoprolol tartrate (LOPRESSOR) 25 MG tablet Take 0.5 tablets (12.5 mg) by mouth 2 times daily 90 tablet 4    order for DME Equipment being ordered: Cpap, mask, tubing, and all associated supplies 1 each 0    phentermine 15 MG capsule Take 1 capsule (15 mg) by mouth every morning 30 capsule 2    sildenafil (REVATIO) 20 MG  "tablet Take 1-5 tablets daily as needed, 30-45 minutes prior to sexual activity 90 tablet 3    telmisartan (MICARDIS) 80 MG tablet Take 1 tablet (80 mg) by mouth daily 90 tablet 3    vitamin C (ASCORBIC ACID) 1000 MG TABS Take 1,000 mg by mouth daily             Review of Systems  Constitutional, neuro, ENT, endocrine, pulmonary, cardiac, gastrointestinal, genitourinary, musculoskeletal, integument and psychiatric systems are negative, except as otherwise noted.      Objective    /68   Pulse 73   Temp 97.2  F (36.2  C) (Tympanic)   Resp 22   Ht 1.803 m (5' 11\")   Wt 121.9 kg (268 lb 12.8 oz)   SpO2 93%   BMI 37.49 kg/m    Body mass index is 37.49 kg/m .  Physical Exam   GENERAL: alert and no distress    The longitudinal plan of care for the diagnosis(es)/condition(s) as documented were addressed during this visit. Due to the added complexity in care, I will continue to support Wei in the subsequent management and with ongoing continuity of care.          Signed Electronically by: Mini Gillis DO    "

## 2024-06-11 NOTE — PATIENT INSTRUCTIONS
Continue phentermine 15 mg.  If you reach a plateau or find the appetite suppression diminishes over time, let me know via My Chart and I can increase 30 mg daily

## 2024-08-10 ENCOUNTER — MYC MEDICAL ADVICE (OUTPATIENT)
Dept: FAMILY MEDICINE | Facility: CLINIC | Age: 74
End: 2024-08-10
Payer: COMMERCIAL

## 2024-08-10 DIAGNOSIS — E66.01 MORBID OBESITY (H): ICD-10-CM

## 2024-08-12 NOTE — TELEPHONE ENCOUNTER
Dr. Gillis,    Please see [employer]! message below and advise. Last OV 06/11.    Per your note:  Continue phentermine 15 mg. If you reach a plateau or find the appetite suppression diminishes over time, let me know via My Chart and I can increase 30 mg daily     Thank you  Bridger BRISENO RN  [facility]

## 2024-08-13 RX ORDER — PHENTERMINE HYDROCHLORIDE 30 MG/1
30 CAPSULE ORAL EVERY MORNING
Qty: 90 CAPSULE | Refills: 0 | Status: SHIPPED | OUTPATIENT
Start: 2024-08-13 | End: 2024-11-11

## 2024-09-03 ENCOUNTER — ALLIED HEALTH/NURSE VISIT (OUTPATIENT)
Dept: FAMILY MEDICINE | Facility: CLINIC | Age: 74
End: 2024-09-03
Payer: COMMERCIAL

## 2024-09-03 VITALS — SYSTOLIC BLOOD PRESSURE: 118 MMHG | DIASTOLIC BLOOD PRESSURE: 60 MMHG

## 2024-09-03 DIAGNOSIS — Z01.30 BP CHECK: Primary | ICD-10-CM

## 2024-09-03 PROCEDURE — 99207 PR NO CHARGE NURSE ONLY: CPT | Performed by: INTERNAL MEDICINE

## 2024-09-04 ENCOUNTER — TELEPHONE (OUTPATIENT)
Dept: SLEEP MEDICINE | Facility: CLINIC | Age: 74
End: 2024-09-04
Payer: COMMERCIAL

## 2024-09-04 DIAGNOSIS — G47.33 OSA (OBSTRUCTIVE SLEEP APNEA): Primary | ICD-10-CM

## 2024-09-04 NOTE — TELEPHONE ENCOUNTER
Order/Referral Request Attention Dr. Tucker     Who is requesting: needs Cpap supplies including mask size medium for C-pap machine. Please order and mail to Patient.     Orders being requested: Supplies for Cpap including a size medium Mask     Reason service is needed/diagnosis: Told needed an appt. Patient is scheduled in Feb. Now, can you please have Dr. Tucker write and order for the supplies and Cpap mask size medium compatible with  [employer] Resmed machine [employer] 2 Respironics   When are orders needed by: ASAP     Has this been discussed with Provider: Not the supplies, has an appt. Now in Feb with Dr. Tucker      Does patient have a preference on a Group/Provider/Facility? [facility]     Does patient have an appointment scheduled?: Yes     Where to send orders: DME So patient can get them shipped by mail to him. Thank you    Could we send this information to you in [facility] or would you prefer to receive a phone call?:   Patient would prefer a phone call   Okay to leave a detailed message?: Send a my chart message.

## 2024-09-09 NOTE — TELEPHONE ENCOUNTER
Last ov 8/16/22  Next ov 2/20/25    Patient requesting updated order for CPAP supplies prior to appointment. Order pended and routed to provider for consideration.    Patient uses [facility]    Elizabeth CURIEL RN  [facility]

## 2024-10-09 ENCOUNTER — OFFICE VISIT (OUTPATIENT)
Dept: URGENT CARE | Facility: URGENT CARE | Age: 74
End: 2024-10-09
Payer: COMMERCIAL

## 2024-10-09 VITALS
TEMPERATURE: 97.7 F | RESPIRATION RATE: 16 BRPM | BODY MASS INDEX: 34.87 KG/M2 | DIASTOLIC BLOOD PRESSURE: 72 MMHG | OXYGEN SATURATION: 96 % | SYSTOLIC BLOOD PRESSURE: 114 MMHG | WEIGHT: 250 LBS | HEART RATE: 78 BPM

## 2024-10-09 DIAGNOSIS — S01.511A LIP LACERATION, INITIAL ENCOUNTER: Primary | ICD-10-CM

## 2024-10-09 DIAGNOSIS — Z23 NEED FOR TDAP VACCINATION: ICD-10-CM

## 2024-10-09 PROCEDURE — 99213 OFFICE O/P EST LOW 20 MIN: CPT | Mod: 25

## 2024-10-09 PROCEDURE — 90715 TDAP VACCINE 7 YRS/> IM: CPT

## 2024-10-09 PROCEDURE — 90471 IMMUNIZATION ADMIN: CPT

## 2024-10-09 RX ORDER — CEPHALEXIN 500 MG/1
500 CAPSULE ORAL 3 TIMES DAILY
Qty: 15 CAPSULE | Refills: 0 | Status: SHIPPED | OUTPATIENT
Start: 2024-10-09 | End: 2024-10-14

## 2024-10-09 NOTE — PATIENT INSTRUCTIONS
Lacerations through the vermillion border -   Precise repair of the vermillion border is essential for a good cosmetic outcome.   When repairing the vermillion border, special stitches are needed at the vermillion border to ensure proper alignment   Then closure is done in a step -wise fashion - typically by trained or cosmetic plastics provider   We do not perform that here in the  and you will need to go to the ED w/in 12 hours     Diagnosis: laceration of lower lip    Tetanus: updated      Plan:    start antibiotics   Tylenol and ibuprofen for pain and swelling   Follow instructions below     Monitor for:   If bleeding starts - apply pressure to the wound to stop the bleeding.  Monitor for signs of infection:   redness, swelling, purulent drainage, warmth at the site, pus   Fevers   Increased pain, worsening pain   Changes in sensation to the site  Inability to move the extremity where injury occurred   Wound area becoming hot or warm to touch.  Pus or fluid leaking out of the wound.  Red streaks that run towards the heart from the wound.    If Steri-Strips are used:  You may wash or shower with steri-strips in place.  Cleanse the area with mild soap and water then gently pat dry with a clean towel.   DO NOT pull, tug, or rub the steri-strips.  Steri Strips will fall off on their own.  If an edge becomes loose, trim it off with scissors that have been washed and air dried with rubbing alcohol. Leave the rest of the steri-strip in place and it will fall off on its own.  Wash hands frequently before and after wound care.

## 2024-10-09 NOTE — PROGRESS NOTES
URGENT CARE  Assessment & Plan   Assessment:   Wei Kline is a 74 year old male who's clinical presentation today is consistent with:   1. Lip laceration, initial encounter  - cephALEXin (KEFLEX) 500 MG capsule;   2. Need for Tdap vaccination  Plan:  Discussed with the patient that we do not do primary closure of lip lacerations through the vermilion border here in the urgent care; as a precise repair is essential for good cosmetic outcome and special stitches are needed typically by a trained provider. I recommend patient present to the ED for higher level of care of his lip laceration   Patient declined today stating he does not want to go to the emergency department and will just go home if we do not do anything, thus cleaned patient's wound today and applied a Steri-Strip to help approximate laceration, and discussed that it will heal with secondary intention and granulation. Educated him to expect a scar.  Patient states an understanding, I did tell him if he changes his mind w/in 12 hrs he can still present to the ED for suturing   Will also cover patient with antibiotics to help prevent infection; tetanus was updated.  Additionally we discussed if symptoms do not improve after starting today's treatment to follow up in 2-3 days, sooner if symptoms worsen, return precautions given especially discussed signs and symptoms of worsening infection  No alarm signs or symptoms present   Differential Diagnoses for this patient's chief complaint that I considered include:  fracture, dislocation, foreign body, infected /contaminated wound, Ligamentous vs tendon pathology      Patient is agreeable to treatment plan and state they will follow-up if symptoms do not improve and/or if symptoms worsen   see patient's AVS 'monitor for' section for specific patient instructions given and discussed regarding what to watch for and when to follow up    RUSSEL Roque [facility] [place]  [facility]      ______________________________________________________________________      Subjective     HPI: Wei Kline  is a 74 year old  male who presents today for evaluation the following concerns:   Patient presents with a laceration to the lower lip .   Patient explains that about 1 hour ago he cut his lip while using a table saw.    Patient states that their sensation is intact, denies any numbness or tingling, and that their ROM is intact in their lip, face and chin/jaw.   Patient is  concerned about foreign bodies in the cute but is concerned it was dirty   Last tetanus was 2019 per [facility]     Review of Systems:  Pertinent review of systems as reflected in HPI, otherwise negative.     Objective    Physical Exam:  Vitals:    10/09/24 1724   BP: 114/72   Pulse: 78   Resp: 16   Temp: 97.7  F (36.5  C)   TempSrc: Tympanic   SpO2: 96%   Weight: 113.4 kg (250 lb)      General:   alert and oriented, no acute distress, non ill-appearing   Vital signs reviewed: afebrile and normotensive   SKIN:   Lower lip, right side through Vermillion border:   Partial thickness laceration lesion externally about 1cm   Internal laceration inside mouth 4mm, suspect cut went through lip (through-and-through lac)   Slight erythremia present, no  inflammation, ecchymosis, puss, colored discharge, or red streaks present.  No current signs of infection observed.  Increased tenderness/pain with palpation surrounding wound. No decreased range of motion at mouth or with facial muscles   Temperature equal to body temperature. Neurovascularity intact with pulse +2  ______________________________________________________________________    I explained my diagnostic considerations and recommendations to the patient, who voiced understanding and agreement with the treatment plan.   All questions were answered.   We discussed potential side effects, risks and benefits of any prescribed or recommended therapies, as well as expectations for  response to treatments.  Please see AVS for any patient instructions & handouts given.   Patient was advised to contact the Nurse Care Line, their Primary Care provider, Urgent Care, or the Emergency Department if there are new or worsening symptoms, or call 911 for emergencies.

## 2024-10-24 ENCOUNTER — TRANSFERRED RECORDS (OUTPATIENT)
Dept: HEALTH INFORMATION MANAGEMENT | Facility: CLINIC | Age: 74
End: 2024-10-24
Payer: COMMERCIAL

## 2024-11-11 DIAGNOSIS — E66.01 MORBID OBESITY (H): ICD-10-CM

## 2024-11-12 RX ORDER — PHENTERMINE HYDROCHLORIDE 30 MG/1
CAPSULE ORAL
Qty: 90 CAPSULE | Refills: 0 | Status: SHIPPED | OUTPATIENT
Start: 2024-11-12

## 2025-01-13 ENCOUNTER — TRANSFERRED RECORDS (OUTPATIENT)
Dept: HEALTH INFORMATION MANAGEMENT | Facility: CLINIC | Age: 75
End: 2025-01-13
Payer: COMMERCIAL

## 2025-02-11 DIAGNOSIS — E66.01 MORBID OBESITY (H): ICD-10-CM

## 2025-02-11 RX ORDER — PHENTERMINE HYDROCHLORIDE 30 MG/1
30 CAPSULE ORAL EVERY MORNING
Qty: 90 CAPSULE | Refills: 0 | Status: SHIPPED | OUTPATIENT
Start: 2025-02-11

## 2025-02-18 ASSESSMENT — SLEEP AND FATIGUE QUESTIONNAIRES
HOW LIKELY ARE YOU TO NOD OFF OR FALL ASLEEP WHILE SITTING AND READING: SLIGHT CHANCE OF DOZING
HOW LIKELY ARE YOU TO NOD OFF OR FALL ASLEEP WHILE SITTING QUIETLY AFTER LUNCH WITHOUT ALCOHOL: SLIGHT CHANCE OF DOZING
HOW LIKELY ARE YOU TO NOD OFF OR FALL ASLEEP WHILE SITTING AND TALKING TO SOMEONE: WOULD NEVER DOZE
HOW LIKELY ARE YOU TO NOD OFF OR FALL ASLEEP WHILE SITTING INACTIVE IN A PUBLIC PLACE: WOULD NEVER DOZE
HOW LIKELY ARE YOU TO NOD OFF OR FALL ASLEEP WHILE LYING DOWN TO REST IN THE AFTERNOON WHEN CIRCUMSTANCES PERMIT: HIGH CHANCE OF DOZING
HOW LIKELY ARE YOU TO NOD OFF OR FALL ASLEEP IN A CAR, WHILE STOPPED FOR A FEW MINUTES IN TRAFFIC: WOULD NEVER DOZE
HOW LIKELY ARE YOU TO NOD OFF OR FALL ASLEEP WHILE WATCHING TV: MODERATE CHANCE OF DOZING
HOW LIKELY ARE YOU TO NOD OFF OR FALL ASLEEP WHEN YOU ARE A PASSENGER IN A CAR FOR AN HOUR WITHOUT A BREAK: SLIGHT CHANCE OF DOZING

## 2025-02-20 ENCOUNTER — OFFICE VISIT (OUTPATIENT)
Dept: SLEEP MEDICINE | Facility: CLINIC | Age: 75
End: 2025-02-20
Payer: COMMERCIAL

## 2025-02-20 VITALS
WEIGHT: 238 LBS | DIASTOLIC BLOOD PRESSURE: 77 MMHG | HEIGHT: 70 IN | HEART RATE: 69 BPM | BODY MASS INDEX: 34.07 KG/M2 | SYSTOLIC BLOOD PRESSURE: 127 MMHG | OXYGEN SATURATION: 95 % | RESPIRATION RATE: 12 BRPM

## 2025-02-20 DIAGNOSIS — G47.33 OSA (OBSTRUCTIVE SLEEP APNEA): Primary | ICD-10-CM

## 2025-02-20 NOTE — NURSING NOTE
"Chief Complaint   Patient presents with    CPAP Follow Up       Initial /77   Pulse 69   Resp 12   Ht 1.784 m (5' 10.24\")   Wt 108 kg (238 lb)   SpO2 95%   BMI 33.92 kg/m   Estimated body mass index is 33.92 kg/m  as calculated from the following:    Height as of this encounter: 1.784 m (5' 10.24\").    Weight as of this encounter: 108 kg (238 lb).    Medication Reconciliation: complete    Neck circumference: 18.9 inches / 48 centimeters.    DME: Alannah Arredondo CMA on 2/20/2025 at 12:05 PM      "
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
## 2025-02-20 NOTE — PATIENT INSTRUCTIONS
Your Body mass index is 33.92 kg/m .  Weight management is a personal decision.  If you are interested in exploring weight loss strategies, the following discussion covers the approaches that may be successful. Body mass index (BMI) is one way to tell whether you are at a healthy weight, overweight, or obese. It measures your weight in relation to your height.  A BMI of 18.5 to 24.9 is in the healthy range. A person with a BMI of 25 to 29.9 is considered overweight, and someone with a BMI of 30 or greater is considered obese. More than two-thirds of American adults are considered overweight or obese.  Being overweight or obese increases the risk for further weight gain. Excess weight may lead to heart disease and diabetes.  Creating and following plans for healthy eating and physical activity may help you improve your health.  Weight control is part of healthy lifestyle and includes exercise, emotional health, and healthy eating habits. Careful eating habits lifelong are the mainstay of weight control. Though there are significant health benefits from weight loss, long-term weight loss with diet alone may be very difficult to achieve- studies show long-term success with dietary management in less than 10% of people. Attaining a healthy weight may be especially difficult to achieve in those with severe obesity. In some cases, medications, devices and surgical management might be considered.  What can you do?  If you are overweight or obese and are interested in methods for weight loss, you should discuss this with your provider.   Consider reducing daily calorie intake by 500 calories.   Keep a food journal.   Avoiding skipping meals, consider cutting portions instead.    Diet combined with exercise helps maintain muscle while optimizing fat loss. Strength training is particularly important for building and maintaining muscle mass. Exercise helps reduce stress, increase energy, and improves fitness. Increasing  exercise without diet control, however, may not burn enough calories to loose weight.     Start walking three days a week 10-20 minutes at a time  Work towards walking thirty minutes five days a week   Eventually, increase the speed of your walking for 1-2 minutes at time    In addition, we recommend that you review healthy lifestyles and methods for weight loss available through the National Institutes of Health patient information sites:  http://win.niddk.nih.gov/publications/index.htm    And look into health and wellness programs that may be available through your health insurance provider, employer, local community center, or april club.

## 2025-02-20 NOTE — NURSING NOTE
1 year appointment reminder message was created and will be sent out 2 - 3 months prior to next appointment due date. Via [place]

## 2025-03-04 ENCOUNTER — TRANSFERRED RECORDS (OUTPATIENT)
Dept: HEALTH INFORMATION MANAGEMENT | Facility: CLINIC | Age: 75
End: 2025-03-04
Payer: COMMERCIAL

## 2025-03-25 ENCOUNTER — OFFICE VISIT (OUTPATIENT)
Dept: FAMILY MEDICINE | Facility: CLINIC | Age: 75
End: 2025-03-25
Payer: COMMERCIAL

## 2025-03-25 VITALS
TEMPERATURE: 97.2 F | BODY MASS INDEX: 34.1 KG/M2 | WEIGHT: 238.2 LBS | HEIGHT: 70 IN | DIASTOLIC BLOOD PRESSURE: 70 MMHG | SYSTOLIC BLOOD PRESSURE: 108 MMHG | HEART RATE: 77 BPM | OXYGEN SATURATION: 98 % | RESPIRATION RATE: 12 BRPM

## 2025-03-25 DIAGNOSIS — Z01.818 PREOP GENERAL PHYSICAL EXAM: Primary | ICD-10-CM

## 2025-03-25 DIAGNOSIS — E78.5 HYPERLIPIDEMIA WITH TARGET LDL LESS THAN 100: ICD-10-CM

## 2025-03-25 DIAGNOSIS — E03.9 ACQUIRED HYPOTHYROIDISM: ICD-10-CM

## 2025-03-25 DIAGNOSIS — E89.0 POSTABLATIVE HYPOTHYROIDISM: ICD-10-CM

## 2025-03-25 DIAGNOSIS — I10 ESSENTIAL HYPERTENSION: ICD-10-CM

## 2025-03-25 DIAGNOSIS — M21.072 ACQUIRED VALGUS DEFORMITY OF LEFT ANKLE: ICD-10-CM

## 2025-03-25 DIAGNOSIS — M1A.09X0 IDIOPATHIC CHRONIC GOUT OF MULTIPLE SITES WITHOUT TOPHUS: ICD-10-CM

## 2025-03-25 DIAGNOSIS — E66.9 NON MORBID OBESITY: ICD-10-CM

## 2025-03-25 DIAGNOSIS — G47.33 OSA (OBSTRUCTIVE SLEEP APNEA): ICD-10-CM

## 2025-03-25 DIAGNOSIS — Z86.79 HISTORY OF CARDIOMYOPATHY: ICD-10-CM

## 2025-03-25 DIAGNOSIS — N18.31 STAGE 3A CHRONIC KIDNEY DISEASE (H): ICD-10-CM

## 2025-03-25 PROBLEM — E66.01 MORBID OBESITY (H): Status: RESOLVED | Noted: 2019-03-22 | Resolved: 2025-03-25

## 2025-03-25 LAB
ANION GAP SERPL CALCULATED.3IONS-SCNC: 11 MMOL/L (ref 7–15)
BASOPHILS # BLD AUTO: 0 10E3/UL (ref 0–0.2)
BASOPHILS NFR BLD AUTO: 1 %
BUN SERPL-MCNC: 30.2 MG/DL (ref 8–23)
CALCIUM SERPL-MCNC: 10.3 MG/DL (ref 8.8–10.4)
CHLORIDE SERPL-SCNC: 105 MMOL/L (ref 98–107)
CHOLEST SERPL-MCNC: 140 MG/DL
CREAT SERPL-MCNC: 1.27 MG/DL (ref 0.67–1.17)
CREAT UR-MCNC: 73.3 MG/DL
EGFRCR SERPLBLD CKD-EPI 2021: 59 ML/MIN/1.73M2
EOSINOPHIL # BLD AUTO: 0.3 10E3/UL (ref 0–0.7)
EOSINOPHIL NFR BLD AUTO: 5 %
ERYTHROCYTE [DISTWIDTH] IN BLOOD BY AUTOMATED COUNT: 13.2 % (ref 10–15)
FASTING STATUS PATIENT QL REPORTED: YES
FASTING STATUS PATIENT QL REPORTED: YES
GLUCOSE SERPL-MCNC: 91 MG/DL (ref 70–99)
HCO3 SERPL-SCNC: 25 MMOL/L (ref 22–29)
HCT VFR BLD AUTO: 43.1 % (ref 40–53)
HDLC SERPL-MCNC: 59 MG/DL
HGB BLD-MCNC: 14.4 G/DL (ref 13.3–17.7)
IMM GRANULOCYTES # BLD: 0 10E3/UL
IMM GRANULOCYTES NFR BLD: 0 %
LDLC SERPL CALC-MCNC: 64 MG/DL
LYMPHOCYTES # BLD AUTO: 2.3 10E3/UL (ref 0.8–5.3)
LYMPHOCYTES NFR BLD AUTO: 35 %
MCH RBC QN AUTO: 31.2 PG (ref 26.5–33)
MCHC RBC AUTO-ENTMCNC: 33.4 G/DL (ref 31.5–36.5)
MCV RBC AUTO: 93 FL (ref 78–100)
MICROALBUMIN UR-MCNC: <12 MG/L
MICROALBUMIN/CREAT UR: NORMAL MG/G{CREAT}
MONOCYTES # BLD AUTO: 0.8 10E3/UL (ref 0–1.3)
MONOCYTES NFR BLD AUTO: 12 %
NEUTROPHILS # BLD AUTO: 3.1 10E3/UL (ref 1.6–8.3)
NEUTROPHILS NFR BLD AUTO: 48 %
NONHDLC SERPL-MCNC: 81 MG/DL
PLATELET # BLD AUTO: 139 10E3/UL (ref 150–450)
POTASSIUM SERPL-SCNC: 4.3 MMOL/L (ref 3.4–5.3)
RBC # BLD AUTO: 4.62 10E6/UL (ref 4.4–5.9)
SODIUM SERPL-SCNC: 141 MMOL/L (ref 135–145)
TRIGL SERPL-MCNC: 86 MG/DL
TSH SERPL DL<=0.005 MIU/L-ACNC: 1.71 UIU/ML (ref 0.3–4.2)
URATE SERPL-MCNC: 5.7 MG/DL (ref 3.4–7)
WBC # BLD AUTO: 6.5 10E3/UL (ref 4–11)

## 2025-03-25 PROCEDURE — 85025 COMPLETE CBC W/AUTO DIFF WBC: CPT | Performed by: INTERNAL MEDICINE

## 2025-03-25 PROCEDURE — 84443 ASSAY THYROID STIM HORMONE: CPT | Performed by: INTERNAL MEDICINE

## 2025-03-25 PROCEDURE — 84550 ASSAY OF BLOOD/URIC ACID: CPT | Performed by: INTERNAL MEDICINE

## 2025-03-25 PROCEDURE — 82043 UR ALBUMIN QUANTITATIVE: CPT | Performed by: INTERNAL MEDICINE

## 2025-03-25 PROCEDURE — 82570 ASSAY OF URINE CREATININE: CPT | Performed by: INTERNAL MEDICINE

## 2025-03-25 PROCEDURE — 36415 COLL VENOUS BLD VENIPUNCTURE: CPT | Performed by: INTERNAL MEDICINE

## 2025-03-25 PROCEDURE — 80048 BASIC METABOLIC PNL TOTAL CA: CPT | Performed by: INTERNAL MEDICINE

## 2025-03-25 PROCEDURE — 80061 LIPID PANEL: CPT | Performed by: INTERNAL MEDICINE

## 2025-03-25 RX ORDER — ATORVASTATIN CALCIUM 20 MG/1
20 TABLET, FILM COATED ORAL DAILY
Qty: 90 TABLET | Refills: 3 | Status: SHIPPED | OUTPATIENT
Start: 2025-03-25

## 2025-03-25 RX ORDER — METOPROLOL TARTRATE 25 MG/1
12.5 TABLET, FILM COATED ORAL 2 TIMES DAILY
Qty: 90 TABLET | Refills: 4 | Status: SHIPPED | OUTPATIENT
Start: 2025-03-25

## 2025-03-25 RX ORDER — TELMISARTAN 80 MG/1
80 TABLET ORAL DAILY
Qty: 90 TABLET | Refills: 3 | Status: SHIPPED | OUTPATIENT
Start: 2025-03-25

## 2025-03-25 RX ORDER — LEVOTHYROXINE SODIUM 137 UG/1
TABLET ORAL
Qty: 90 TABLET | Refills: 4 | Status: SHIPPED | OUTPATIENT
Start: 2025-03-25

## 2025-03-25 RX ORDER — THERA TABS 400 MCG
1 TAB ORAL DAILY
COMMUNITY

## 2025-03-25 RX ORDER — CYANOCOBALAMIN (VITAMIN B-12) 500 MCG
400 LOZENGE ORAL DAILY
COMMUNITY

## 2025-03-25 RX ORDER — ALLOPURINOL 300 MG/1
300 TABLET ORAL DAILY
Qty: 90 TABLET | Refills: 4 | Status: SHIPPED | OUTPATIENT
Start: 2025-03-25

## 2025-03-25 ASSESSMENT — PAIN SCALES - GENERAL: PAINLEVEL_OUTOF10: MODERATE PAIN (5)
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
## 2025-03-25 NOTE — PATIENT INSTRUCTIONS
How to Take Your Medication Before Surgery  Preoperative Medication Instructions   Antiplatelet or Anticoagulation Medication Instructions   - We reviewed the medication list and the patient is not on an antiplatelet or anticoagulation medications.    Additional Medication Instructions   - ibuprofen (Advil, Motrin): DO NOT TAKE 1 day before surgery.    - phentermine: DO NOT TAKE 7 days prior to surgery.    Stop your vitamins/supplements from now until after surgery.  You can resume the day after surgery  Do not take the Telmisartan the AM of surgery  Do not take phentermine for 1 week prior to surgery  Take all other Rx medications as normal up through surgery  Use your CPAP regularly        Patient Education   Preparing for Your Surgery  For Adults  Getting started  In most cases, a nurse will call to review your health history and instructions. They will give you an arrival time based on your scheduled surgery time. Please be ready to share:  Your doctor's clinic name and phone number  Your medical, surgical, and anesthesia history  A list of allergies and sensitivities  A list of medicines, including herbal treatments and over-the-counter drugs  Whether the patient has a legal guardian (ask how to send us the papers in advance)  Note: You may not receive a call if you were seen at our PAC (Preoperative Assessment Center).  Please tell us if you're pregnant--or if there's any chance you might be pregnant. Some surgeries may injure a fetus (unborn baby), so they require a pregnancy test. Surgeries that are safe for a fetus don't always need a test, and you can choose whether to have one.   Preparing for surgery  Within 10 to 30 days of surgery: Have a pre-op exam (sometimes called an H&P, or History and Physical). This can be done at a clinic or pre-operative center.  If you're having a , you may not need this exam. Talk to your care team.  At your pre-op exam, talk to your care team about all medicines  you take. (This includes CBD oil and any drugs, such as THC, marijuana, and other forms of cannabis.) If you need to stop any medicine before surgery, ask when to start taking it again.  This is for your safety. Many medicines and drugs can make you bleed too much during surgery. Some change how well surgery (anesthesia) drugs work.  Call your insurance company to let them know you're having surgery. (If you don't have insurance, call [phone].)  Call your clinic if there's any change in your health. This includes a scrape or scratch near the surgery site, or any signs of a cold (sore throat, runny nose, cough, rash, fever).  Eating and drinking guidelines  For your safety: Unless your surgeon tells you otherwise, follow the guidelines below.  Eat and drink as normal until 8 hours before you arrive for surgery. After that, no food or milk. You can spit out gum when you arrive.  Drink clear liquids until 2 hours before you arrive. These are liquids you can see through, like water, Gatorade, and Propel Water. They also include plain black coffee and tea (no cream or milk).  No alcohol for 24 hours before you arrive. The night before surgery, stop any drinks that contain THC.  If your care team tells you to take medicine on the morning of surgery, it's okay to take it with a sip of water. No other medicines or drugs are allowed (including CBD oil)--follow your care team's instructions.  If you have questions the day of surgery, call your hospital or surgery center.   Preventing infection  Shower or bathe the night before and the morning of surgery. Follow the instructions your clinic gave you. (If no instructions, use regular soap.)  Don't shave or clip hair near your surgery site. We'll remove the hair if needed.  Don't smoke or vape the morning of surgery. No chewing tobacco for 6 hours before you arrive. A nicotine patch is okay. You may spit out nicotine gum when you arrive.  For some surgeries, the surgeon  will tell you to fully quit smoking and nicotine.  We will make every effort to keep you safe from infection. We will:  Clean our hands often with soap and water (or an alcohol-based hand rub).  Clean the skin at your surgery site with a special soap that kills germs.  Give you a special gown to keep you warm. (Cold raises the risk of infection.)  Wear hair covers, masks, gowns, and gloves during surgery.  Give antibiotic medicine, if prescribed. Not all surgeries need this medicine.  What to bring on the day of surgery  Photo ID and insurance card  Copy of your health care directive, if you have one  Glasses and hearing aids (bring cases)  You can't wear contacts during surgery  Inhaler and eye drops, if you use them (tell us about these when you arrive)  CPAP machine or breathing device, if you use them  A few personal items, if spending the night  If you have . . .  A pacemaker, ICD (cardiac defibrillator), or other implant: Bring the ID card.  An implanted stimulator: Bring the remote control.  A legal guardian: Bring a copy of the certified (court-stamped) guardianship papers.  Please remove any jewelry, including body piercings. Leave jewelry and other valuables at home.  If you're going home the day of surgery  You must have a responsible adult drive you home. They should stay with you overnight as well.  If you don't have someone to stay with you, and you aren't safe to go home alone, we may keep you overnight. Insurance often won't pay for this.  After surgery  If it's hard to control your pain or you need more pain medicine, please call your surgeon's office.  Questions?   If you have any questions for your care team, list them here:   ____________________________________________________________________________________________________________________________________________________________________________________________________________________________________________________________  For informational  purposes only. Not to replace the advice of your health care provider. Copyright   2003, 2019 [facility]. All rights reserved. Clinically reviewed by Bert Hernandez MD. [employer] [id number] - REV 08/24.

## 2025-04-05 SDOH — HEALTH STABILITY: PHYSICAL HEALTH: ON AVERAGE, HOW MANY DAYS PER WEEK DO YOU ENGAGE IN MODERATE TO STRENUOUS EXERCISE (LIKE A BRISK WALK)?: 7 DAYS

## 2025-04-05 SDOH — HEALTH STABILITY: PHYSICAL HEALTH: ON AVERAGE, HOW MANY MINUTES DO YOU ENGAGE IN EXERCISE AT THIS LEVEL?: 60 MIN

## 2025-04-05 ASSESSMENT — SOCIAL DETERMINANTS OF HEALTH (SDOH): HOW OFTEN DO YOU GET TOGETHER WITH FRIENDS OR RELATIVES?: MORE THAN THREE TIMES A WEEK

## 2025-04-08 ENCOUNTER — OFFICE VISIT (OUTPATIENT)
Dept: FAMILY MEDICINE | Facility: CLINIC | Age: 75
End: 2025-04-08
Payer: COMMERCIAL

## 2025-04-08 VITALS
SYSTOLIC BLOOD PRESSURE: 116 MMHG | BODY MASS INDEX: 34.09 KG/M2 | DIASTOLIC BLOOD PRESSURE: 80 MMHG | HEIGHT: 70 IN | RESPIRATION RATE: 16 BRPM | WEIGHT: 238.1 LBS | OXYGEN SATURATION: 98 % | TEMPERATURE: 97 F | HEART RATE: 76 BPM

## 2025-04-08 DIAGNOSIS — I10 ESSENTIAL HYPERTENSION: ICD-10-CM

## 2025-04-08 DIAGNOSIS — Z12.5 SCREENING FOR PROSTATE CANCER: ICD-10-CM

## 2025-04-08 DIAGNOSIS — E78.5 HYPERLIPIDEMIA WITH TARGET LDL LESS THAN 100: ICD-10-CM

## 2025-04-08 DIAGNOSIS — E89.0 POSTABLATIVE HYPOTHYROIDISM: ICD-10-CM

## 2025-04-08 DIAGNOSIS — N18.31 STAGE 3A CHRONIC KIDNEY DISEASE (H): ICD-10-CM

## 2025-04-08 DIAGNOSIS — D69.6 THROMBOCYTOPENIA: ICD-10-CM

## 2025-04-08 DIAGNOSIS — N52.9 VASCULOGENIC ERECTILE DYSFUNCTION, UNSPECIFIED VASCULOGENIC ERECTILE DYSFUNCTION TYPE: ICD-10-CM

## 2025-04-08 DIAGNOSIS — M1A.09X0 IDIOPATHIC CHRONIC GOUT OF MULTIPLE SITES WITHOUT TOPHUS: ICD-10-CM

## 2025-04-08 DIAGNOSIS — Z00.00 ENCOUNTER FOR MEDICARE ANNUAL WELLNESS EXAM: Primary | ICD-10-CM

## 2025-04-08 RX ORDER — TADALAFIL 10 MG/1
TABLET ORAL
Qty: 30 TABLET | Refills: 11 | Status: SHIPPED | OUTPATIENT
Start: 2025-04-08

## 2025-04-08 ASSESSMENT — PAIN SCALES - GENERAL: PAINLEVEL_OUTOF10: NO PAIN (0)

## 2025-04-08 NOTE — PATIENT INSTRUCTIONS
Patient Education   Preventive Care Advice   This is general advice given by our system to help you stay healthy. However, your care team may have specific advice just for you. Please talk to your care team about your preventive care needs.  Nutrition  Eat 5 or more servings of fruits and vegetables each day.  Try wheat bread, brown rice and whole grain pasta (instead of white bread, rice, and pasta).  Get enough calcium and vitamin D. Check the label on foods and aim for 100% of the RDA (recommended daily allowance).  Lifestyle  Exercise at least 150 minutes each week  (30 minutes a day, 5 days a week).  Do muscle strengthening activities 2 days a week. These help control your weight and prevent disease.  No smoking.  Wear sunscreen to prevent skin cancer.  Have a dental exam and cleaning every 6 months.  Yearly exams  See your health care team every year to talk about:  Any changes in your health.  Any medicines your care team has prescribed.  Preventive care, family planning, and ways to prevent chronic diseases.  Shots (vaccines)   HPV shots (up to age 26), if you've never had them before.  Hepatitis B shots (up to age 59), if you've never had them before.  COVID-19 shot: Get this shot when it's due.  Flu shot: Get a flu shot every year.  Tetanus shot: Get a tetanus shot every 10 years.  Pneumococcal, hepatitis A, and RSV shots: Ask your care team if you need these based on your risk.  Shingles shot (for age 50 and up)  General health tests  Diabetes screening:  Starting at age 35, Get screened for diabetes at least every 3 years.  If you are younger than age 35, ask your care team if you should be screened for diabetes.  Cholesterol test: At age 39, start having a cholesterol test every 5 years, or more often if advised.  Bone density scan (DEXA): At age 50, ask your care team if you should have this scan for osteoporosis (brittle bones).  Hepatitis C: Get tested at least once in your life.  STIs (sexually  transmitted infections)  Before age 24: Ask your care team if you should be screened for STIs.  After age 24: Get screened for STIs if you're at risk. You are at risk for STIs (including HIV) if:  You are sexually active with more than one person.  You don't use condoms every time.  You or a partner was diagnosed with a sexually transmitted infection.  If you are at risk for HIV, ask about PrEP medicine to prevent HIV.  Get tested for HIV at least once in your life, whether you are at risk for HIV or not.  Cancer screening tests  Cervical cancer screening: If you have a cervix, begin getting regular cervical cancer screening tests starting at age 21.  Breast cancer scan (mammogram): If you've ever had breasts, begin having regular mammograms starting at age 40. This is a scan to check for breast cancer.  Colon cancer screening: It is important to start screening for colon cancer at age 45.  Have a colonoscopy test every 10 years (or more often if you're at risk) Or, ask your provider about stool tests like a FIT test every year or Cologuard test every 3 years.  To learn more about your testing options, visit:   .  For help making a decision, visit:   https://bit.ly/wf47382.  Prostate cancer screening test: If you have a prostate, ask your care team if a prostate cancer screening test (PSA) at age 55 is right for you.  Lung cancer screening: If you are a current or former smoker ages 50 to 80, ask your care team if ongoing lung cancer screenings are right for you.  For informational purposes only. Not to replace the advice of your health care provider. Copyright   2023 [employer]. All rights reserved. Clinically reviewed by the [facility]. [employer] [id number] - REV 01/24.

## 2025-04-08 NOTE — PROGRESS NOTES
"Preventive Care Visit  [facility]  Mini Gillis DO, Internal Medicine  Apr 8, 2025      Assessment & Plan     Encounter for Medicare annual wellness exam    Vasculogenic erectile dysfunction, unspecified vasculogenic erectile dysfunction type  .rhsref  - tadalafil (CIALIS) 10 MG tablet; Take 1-2 tablets daily as needed, 30-45 minutes prior to intended sexual activity    Stage 3a chronic kidney disease (H)  Known issue that I take into account for their medical decisions, no current exacerbations or new concerns      Postablative hypothyroidism  Order for next year  - TSH with free T4 reflex; Future    Thrombocytopenia  Chronic and stable since 2022.  No clear medication culprits.  Monitor without further workup unless he develops pancytopenia or worsening thrombocytopenia    Idiopathic chronic gout of multiple sites without tophus  No flares in many years.  Lab order for next year  - Uric acid; Future    Essential hypertension  Stable, lab order for next year  - Comprehensive metabolic panel (BMP + Alb, Alk Phos, ALT, AST, Total. Bili, TP); Future  - CBC with platelets; Future    Hyperlipidemia with target LDL less than 100  Stable, lab order for next year  - Lipid panel reflex to direct LDL Fasting; Future    Screening for prostate cancer  Discussed risk and benefit and patient opts for screening next year  - PSA, screen; Future    Patient has been advised of split billing requirements and indicates understanding: Yes        BMI  Estimated body mass index is 33.9 kg/m  as calculated from the following:    Height as of this encounter: 1.785 m (5' 10.28\").    Weight as of this encounter: 108 kg (238 lb 1.6 oz).   Weight management plan: Discussed healthy diet and exercise guidelines    Counseling  Appropriate preventive services were addressed with this patient via screening, questionnaire, or discussion as appropriate for fall prevention, nutrition, physical activity, Tobacco-use " cessation, social engagement, weight loss and cognition.  Checklist reviewing preventive services available has been given to the patient.  Reviewed patient's diet, addressing concerns and/or questions.           Subjective   Wei is a 75 year old, presenting for the following:  Physical, Lipids, and Thyroid Disease        4/8/2025     7:18 AM   Additional Questions   Roomed by omero   Accompanied by self         4/8/2025     7:18 AM   Patient Reported Additional Medications   Patient reports taking the following new medications none           HPI       Chief Complaint   Patient presents with    Physical    Lipids    Thyroid Disease     LILLY  --He uses his CPAP regularly  --he reports having mild delirium after anesthesia in the past. 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  Mild EF initially, but echo in 2014 with normal EF.  No symptoms in many years     Gout   -- Well-controlled on allopurinol     Hypothyroid well-controlled on levothyroxine-      CKD 3a: GRF stable with Cr 1.3s     Hypertension  -- Well-controlled on telmisartan and metoprolol    Weight  -- Phentermine was started 4/2024, starting weight of 296 pounds, for BMI of 41  -- Dose was increased from 15 to 30 mg 6/2024  --med is going well;  decreases appetite, increases energy      Wt Readings from Last 5 Encounters:   04/08/25 108 kg (238 lb 1.6 oz)   03/25/25 108 kg (238 lb 3.2 oz)   02/20/25 108 kg (238 lb)   10/09/24 113.4 kg (250 lb)   06/11/24 121.9 kg (268 lb 12.8 oz)         Hyperlipidemia Follow-Up    Are you regularly taking any medication or supplement to lower your cholesterol?   Yes- pm  Are you having muscle aches or other side effects that you think could be caused by your cholesterol lowering medication?  No    Hypothyroidism Follow-up    Since last visit, patient describes the following symptoms: Weight stable, no hair loss, no skin changes, no  constipation, no loose stools      Advance Care Planning  Patient has a Health Care Directive on file  Advance care planning document is on file and is current.      4/5/2025   General Health   How would you rate your overall physical health? Good   Feel stress (tense, anxious, or unable to sleep) Not at all         4/5/2025   Nutrition   Diet: Regular (no restrictions)         4/5/2025   Exercise   Days per week of moderate/strenous exercise 7 days   Average minutes spent exercising at this level 60 min         4/5/2025   Social Factors   Frequency of gathering with friends or relatives More than three times a week   Worry food won't last until get money to buy more No   Food not last or not have enough money for food? No   Do you have housing? (Housing is defined as stable permanent housing and does not include staying ouside in a car, in a tent, in an abandoned building, in an overnight shelter, or couch-surfing.) No   Are you worried about losing your housing? No   Lack of transportation? No   Unable to get utilities (heat,electricity)? No   Want help with housing or utility concern? No   (!) HOUSING CONCERN PRESENT      4/5/2025   Fall Risk   Fallen 2 or more times in the past year? No   Trouble with walking or balance? No          4/5/2025   Activities of Daily Living- Home Safety   Needs help with the following daily activites None of the above   Safety concerns in the home None of the above         4/5/2025   Dental   Dentist two times every year? Yes         4/5/2025   Hearing Screening   Hearing concerns? None of the above         4/5/2025   Driving Risk Screening   Patient/family members have concerns about driving No         4/5/2025   General Alertness/Fatigue Screening   Have you been more tired than usual lately? No         4/5/2025   Urinary Incontinence Screening   Bothered by leaking urine in past 6 months No           3/23/2024   TB Screening   Were you born outside of the [place]? No           Today's  PHQ-2 Score:       2025     6:35 AM   PHQ-2 (  [employer])   Q1: Little interest or pleasure in doing things 0   Q2: Feeling down, depressed or hopeless 0   PHQ-2 Score 0    Q1: Little interest or pleasure in doing things Not at all   Q2: Feeling down, depressed or hopeless Not at all   PHQ-2 Score 0       Patient-reported           2025   Substance Use   Alcohol more than 3/day or more than 7/wk No   Do you have a current opioid prescription? No   How severe/bad is pain from 1 to 10? 10   Do you use any other substances recreationally? No     Social History     Tobacco Use    Smoking status: Former     Current packs/day: 0.00     Average packs/day: 0.5 packs/day for 5.0 years (2.5 ttl pk-yrs)     Types: Cigarettes     Start date: 1955     Quit date: 1960     Years since quittin.2    Smokeless tobacco: Never   Vaping Use    Vaping status: Never Used   Substance Use Topics    Alcohol use: Yes     Comment: occasional    Drug use: No       ASCVD Risk   The 10-year ASCVD risk score (Massimo MOE, et al., 2019) is: 22.4%    Values used to calculate the score:      Age: 75 years      Sex: Male      Is Non- : No      Diabetic: No      Tobacco smoker: No      Systolic Blood Pressure: 116 mmHg      Is BP treated: Yes      HDL Cholesterol: 59 mg/dL      Total Cholesterol: 140 mg/dL            Reviewed and updated as needed this visit by Provider   Tobacco  Allergies  Meds  Problems  Med Hx  Surg Hx  Fam Hx            Current Outpatient Medications   Medication Sig Dispense Refill    allopurinol (ZYLOPRIM) 300 MG tablet Take 1 tablet (300 mg) by mouth daily. 90 tablet 4    atorvastatin (LIPITOR) 20 MG tablet Take 1 tablet (20 mg) by mouth daily. 90 tablet 3    levothyroxine (SYNTHROID/LEVOTHROID) 137 MCG tablet TAKE 1 TABLET (137 MCG) BY MOUTH DAILY 90 tablet 4    metoprolol tartrate (LOPRESSOR) 25 MG tablet Take 0.5 tablets (12.5 mg) by mouth 2 times daily. 90  tablet 4    multivitamin, therapeutic (THERA-VIT) TABS tablet Take 1 tablet by mouth daily.      order for DME Equipment being ordered: Cpap, mask, tubing, and all associated supplies 1 each 0    phentermine 30 MG capsule Take 1 capsule by mouth in the morning 90 capsule 0    tadalafil (CIALIS) 10 MG tablet Take 1-2 tablets daily as needed, 30-45 minutes prior to intended sexual activity 30 tablet 2    telmisartan (MICARDIS) 80 MG tablet Take 1 tablet (80 mg) by mouth daily. 90 tablet 3    vitamin C (ASCORBIC ACID) 1000 MG TABS Take 1,000 mg by mouth daily      vitamin E 400 units TABS Take 400 Units by mouth daily.       Current providers sharing in care for this patient include:  Patient Care Team:  Mini Gillis DO as PCP - General (Internal Medicine)  Mini Gillis DO as Assigned PCP  Althea Guerra PA-C as Physician Assistant (Urology)  Althea Guerra PA-C as Assigned Surgical Provider    The following health maintenance items are reviewed in Epic and correct as of today:  Health Maintenance   Topic Date Due    RSV VACCINE (1 - 1-dose 75+ series) 04/08/2026 (Originally 2/5/2024)    COVID-19 Vaccine (7 - 2024-25 season) 04/30/2025    BMP  03/25/2026    LIPID  03/25/2026    MICROALBUMIN  03/25/2026    TSH W/FREE T4 REFLEX  03/25/2026    ANNUAL REVIEW OF HM ORDERS  03/25/2026    CBC  03/25/2026    URIC ACID  03/25/2026    HEMOGLOBIN  03/25/2026    MEDICARE ANNUAL WELLNESS VISIT  04/08/2026    FALL RISK ASSESSMENT  04/08/2026    COLORECTAL CANCER SCREENING  06/03/2027    DIABETES SCREENING  03/25/2028    ADVANCE CARE PLANNING  04/08/2030    DTAP/TDAP/TD IMMUNIZATION (2 - Td or Tdap) 10/09/2034    PHQ-2 (once per calendar year)  Completed    INFLUENZA VACCINE  Completed    Pneumococcal Vaccine: 50+ Years  Completed    URINALYSIS  Completed    ZOSTER IMMUNIZATION  Completed    HEPATITIS C SCREENING  Addressed    HPV IMMUNIZATION  Aged Out    MENINGITIS IMMUNIZATION  Aged Out         Review of  "Systems  Constitutional, neuro, ENT, endocrine, pulmonary, cardiac, gastrointestinal, genitourinary, musculoskeletal, integument and psychiatric systems are negative, except as otherwise noted.     Objective    Exam  /80 (BP Location: Right arm, Patient Position: Sitting, Cuff Size: Adult Large)   Pulse 76   Temp 97  F (36.1  C) (Tympanic)   Resp 16   Ht 1.785 m (5' 10.28\")   Wt 108 kg (238 lb 1.6 oz)   SpO2 98%   BMI 33.90 kg/m     Estimated body mass index is 33.9 kg/m  as calculated from the following:    Height as of this encounter: 1.785 m (5' 10.28\").    Weight as of this encounter: 108 kg (238 lb 1.6 oz).    Physical Exam  GENERAL: alert and no distress  EYES: Eyes grossly normal to inspection, PERRL and conjunctivae and sclerae normal  HENT: ear canals and TM's normal, nose and mouth without ulcers or lesions  NECK: no adenopathy, no asymmetry, masses, or scars  RESP: lungs clear to auscultation - no rales, rhonchi or wheezes  CV: regular rate and rhythm, normal S1 S2, no S3 or S4, no murmur, click or rub, no peripheral edema  ABDOMEN: soft, nontender, no hepatosplenomegaly, no masses and bowel sounds normal  MS: Left leg wrapped in bandage  SKIN: no suspicious lesions or rashes  NEURO: Normal strength and tone, mentation intact and speech normal  PSYCH: mentation appears normal, affect normal/bright         4/8/2025   Mini Cog   Clock Draw Score 0 Abnormal   3 Item Recall 3 objects recalled   Mini Cog Total Score 3             4/5/2024   Vision Screen   Vision Screen Results REFER    REFER   No Corrective Lenses, PLUS LENS REQUIRED REFER    REFER       Multiple values from one day are sorted in reverse-chronological order       Signed Electronically by: Mini Gillis DO    "

## 2025-04-15 ENCOUNTER — TRANSFERRED RECORDS (OUTPATIENT)
Dept: HEALTH INFORMATION MANAGEMENT | Facility: CLINIC | Age: 75
End: 2025-04-15
Payer: COMMERCIAL

## 2025-05-13 ENCOUNTER — TRANSFERRED RECORDS (OUTPATIENT)
Dept: HEALTH INFORMATION MANAGEMENT | Facility: CLINIC | Age: 75
End: 2025-05-13
Payer: COMMERCIAL

## 2025-05-18 DIAGNOSIS — E66.01 MORBID OBESITY (H): ICD-10-CM

## 2025-05-19 RX ORDER — PHENTERMINE HYDROCHLORIDE 30 MG/1
30 CAPSULE ORAL EVERY MORNING
Qty: 90 CAPSULE | Refills: 0 | Status: SHIPPED | OUTPATIENT
Start: 2025-05-19

## 2025-05-25 ENCOUNTER — NURSE TRIAGE (OUTPATIENT)
Dept: NURSING | Facility: CLINIC | Age: 75
End: 2025-05-25
Payer: COMMERCIAL

## 2025-05-25 ENCOUNTER — HOSPITAL ENCOUNTER (EMERGENCY)
Facility: CLINIC | Age: 75
Discharge: HOME | End: 2025-05-25
Payer: COMMERCIAL

## 2025-05-25 VITALS
WEIGHT: 238 LBS | OXYGEN SATURATION: 96 % | DIASTOLIC BLOOD PRESSURE: 83 MMHG | RESPIRATION RATE: 16 BRPM | BODY MASS INDEX: 33.88 KG/M2 | SYSTOLIC BLOOD PRESSURE: 133 MMHG | HEART RATE: 80 BPM | TEMPERATURE: 97.3 F

## 2025-05-25 DIAGNOSIS — R21 RASH: ICD-10-CM

## 2025-05-25 PROCEDURE — G0463 HOSPITAL OUTPT CLINIC VISIT: HCPCS

## 2025-05-25 PROCEDURE — 99213 OFFICE O/P EST LOW 20 MIN: CPT

## 2025-05-25 RX ORDER — TRIAMCINOLONE ACETONIDE 1 MG/G
CREAM TOPICAL 2 TIMES DAILY
Qty: 30 G | Refills: 0 | Status: SHIPPED | OUTPATIENT
Start: 2025-05-25 | End: 2025-06-01

## 2025-05-25 RX ORDER — DOXYCYCLINE 100 MG/1
100 CAPSULE ORAL 2 TIMES DAILY
Qty: 20 CAPSULE | Refills: 0 | Status: SHIPPED | OUTPATIENT
Start: 2025-05-25 | End: 2025-06-04

## 2025-05-25 ASSESSMENT — COLUMBIA-SUICIDE SEVERITY RATING SCALE - C-SSRS
1. IN THE PAST MONTH, HAVE YOU WISHED YOU WERE DEAD OR WISHED YOU COULD GO TO SLEEP AND NOT WAKE UP?: NO
6. HAVE YOU EVER DONE ANYTHING, STARTED TO DO ANYTHING, OR PREPARED TO DO ANYTHING TO END YOUR LIFE?: NO
2. HAVE YOU ACTUALLY HAD ANY THOUGHTS OF KILLING YOURSELF IN THE PAST MONTH?: NO

## 2025-05-25 ASSESSMENT — ACTIVITIES OF DAILY LIVING (ADL): ADLS_ACUITY_SCORE: 41

## 2025-05-25 NOTE — TELEPHONE ENCOUNTER
Nurse Triage SBAR    Is this a 2nd Level Triage? NO    Situation: Patient calling about a tick bite.  Consent: not needed    Background: Patient's 2nd finger on left hand was really itchy last night and he though he had poison ivy. Woke at 3am and felt a tick in the same area. Pulled the tick off and it washed down the sink drain right away. Thinks it was a wood tick, but not a deer tick.     Assessment:   Red, bumpy rash on left hand between 2nd and 3rd fingers on left had  Itchy  Hand is swollen  No fever  No red streaking    Protocol Recommended Disposition:   See PCP Within 24 Hours    Recommendation: Advised patient to go to urgent care. Care advice given including when to call back. Patient verbalized understanding and agreed with plan.       Denise Hewitt RN [facility] 5/25/2025 9:21 AM    Reason for Disposition   [1] Red or very tender (to touch) area AND [2] started over 24 hours after the bite    Additional Information   Negative: Sounds like a life-threatening emergency to the triager   Negative: Not a tick bite   Negative: [1] 2 to 14 days following tick bite AND [2] severe headache with fever occurs   Negative: [1] 2 to 14 days following tick bite AND [2] widespread rash with fever occurs   Negative: Patient sounds very sick or weak to the triager   Negative: [1] Fever AND [2] spreading red area or streak   Negative: [1] Fever AND [2] area is very tender to touch   Negative: [1] Red streak or red line AND [2] length > 2 inches (5 cm)   Negative: Can't remove live tick (after trying Care Advice)   Negative: [1] 2 to 14 days following tick bite AND [2] fever AND [3] no rash or headache   Negative: [1] 2 to 14 days following tick bite AND [2] widespread rash or headache AND [3] no fever    Protocols used: Tick Bite-A-

## 2025-05-25 NOTE — ED TRIAGE NOTES
Pt pulled a tick from between his index and middle finger Saturday morning. Now has a rash in between fingers and index finger joint is swollen. Pt states it's itchy.     Triage Assessment (Adult)       Row Name 05/25/25 1043          Triage Assessment    Airway WDL WDL        Respiratory WDL    Respiratory WDL WDL        Skin Circulation/Temperature WDL    Skin Circulation/Temperature WDL WDL        Cardiac WDL    Cardiac WDL WDL        Peripheral/Neurovascular WDL    Peripheral Neurovascular WDL WDL        Cognitive/Neuro/Behavioral WDL    Cognitive/Neuro/Behavioral WDL WDL

## 2025-05-25 NOTE — DISCHARGE INSTRUCTIONS
Doxycycline twice daily for the next 10 days.  Apply Kenalog cream twice daily for 7 days.  If no improvement after 48 hours please follow-up.
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
## 2025-05-26 ENCOUNTER — TELEPHONE (OUTPATIENT)
Dept: SCHEDULING | Facility: CLINIC | Age: 75
End: 2025-05-26
Payer: COMMERCIAL

## 2025-05-27 ENCOUNTER — OFFICE VISIT (OUTPATIENT)
Dept: PEDIATRICS | Facility: CLINIC | Age: 75
End: 2025-05-27
Payer: COMMERCIAL

## 2025-05-27 ENCOUNTER — ANCILLARY PROCEDURE (OUTPATIENT)
Dept: GENERAL RADIOLOGY | Facility: CLINIC | Age: 75
End: 2025-05-27
Payer: COMMERCIAL

## 2025-05-27 ENCOUNTER — TELEPHONE (OUTPATIENT)
Dept: FAMILY MEDICINE | Facility: CLINIC | Age: 75
End: 2025-05-27
Payer: COMMERCIAL

## 2025-05-27 VITALS
BODY MASS INDEX: 34.1 KG/M2 | TEMPERATURE: 97.5 F | SYSTOLIC BLOOD PRESSURE: 131 MMHG | HEIGHT: 71 IN | OXYGEN SATURATION: 97 % | WEIGHT: 243.6 LBS | HEART RATE: 61 BPM | DIASTOLIC BLOOD PRESSURE: 77 MMHG

## 2025-05-27 DIAGNOSIS — L03.114 CELLULITIS OF LEFT HAND: Primary | ICD-10-CM

## 2025-05-27 DIAGNOSIS — I89.1 LYMPHANGITIS OF UPPER EXTREMITY: ICD-10-CM

## 2025-05-27 LAB
ANION GAP SERPL CALCULATED.3IONS-SCNC: 10 MMOL/L (ref 7–15)
BUN SERPL-MCNC: 37.8 MG/DL (ref 8–23)
CALCIUM SERPL-MCNC: 10.8 MG/DL (ref 8.8–10.4)
CHLORIDE SERPL-SCNC: 106 MMOL/L (ref 98–107)
CREAT SERPL-MCNC: 1.51 MG/DL (ref 0.67–1.17)
CRP SERPL-MCNC: <3 MG/L
EGFRCR SERPLBLD CKD-EPI 2021: 48 ML/MIN/1.73M2
ERYTHROCYTE [DISTWIDTH] IN BLOOD BY AUTOMATED COUNT: 13.3 % (ref 10–15)
ERYTHROCYTE [SEDIMENTATION RATE] IN BLOOD BY WESTERGREN METHOD: 13 MM/HR (ref 0–20)
GLUCOSE SERPL-MCNC: 83 MG/DL (ref 70–99)
HCO3 SERPL-SCNC: 22 MMOL/L (ref 22–29)
HCT VFR BLD AUTO: 42.5 % (ref 40–53)
HGB BLD-MCNC: 14.3 G/DL (ref 13.3–17.7)
MCH RBC QN AUTO: 31.4 PG (ref 26.5–33)
MCHC RBC AUTO-ENTMCNC: 33.6 G/DL (ref 31.5–36.5)
MCV RBC AUTO: 93 FL (ref 78–100)
PLATELET # BLD AUTO: 151 10E3/UL (ref 150–450)
POTASSIUM SERPL-SCNC: 4.8 MMOL/L (ref 3.4–5.3)
RBC # BLD AUTO: 4.56 10E6/UL (ref 4.4–5.9)
SODIUM SERPL-SCNC: 138 MMOL/L (ref 135–145)
WBC # BLD AUTO: 7.7 10E3/UL (ref 4–11)

## 2025-05-27 PROCEDURE — 85652 RBC SED RATE AUTOMATED: CPT

## 2025-05-27 PROCEDURE — 85027 COMPLETE CBC AUTOMATED: CPT

## 2025-05-27 PROCEDURE — 3075F SYST BP GE 130 - 139MM HG: CPT

## 2025-05-27 PROCEDURE — 3078F DIAST BP <80 MM HG: CPT

## 2025-05-27 PROCEDURE — 99215 OFFICE O/P EST HI 40 MIN: CPT

## 2025-05-27 PROCEDURE — 80048 BASIC METABOLIC PNL TOTAL CA: CPT

## 2025-05-27 PROCEDURE — 1125F AMNT PAIN NOTED PAIN PRSNT: CPT

## 2025-05-27 PROCEDURE — 73130 X-RAY EXAM OF HAND: CPT | Mod: TC | Performed by: RADIOLOGY

## 2025-05-27 PROCEDURE — 36415 COLL VENOUS BLD VENIPUNCTURE: CPT

## 2025-05-27 PROCEDURE — 86140 C-REACTIVE PROTEIN: CPT

## 2025-05-27 ASSESSMENT — PAIN SCALES - GENERAL: PAINLEVEL_OUTOF10: MILD PAIN (3)

## 2025-05-27 NOTE — TELEPHONE ENCOUNTER
S-(situation): Pt has a very swollen, red, left hand. It's his left hand, the top of hand.    B-(background): Pt was in ER on 5/25. He had a tick bite and a rash on left top of hand.    A-(assessment): Left hand is now more swollen than when he left the ER on 5/25.  It is swollen from finger tips to wrist.  The tick was removed by patient and was on for approx 6 hours.  Pt was started on doxycycline on 5/25 in ER.  No fever  He doesn't feel sick.    R-(recommendations): I talked with Dr Ybarra at [facility] and he said they will call patient.

## 2025-05-27 NOTE — Clinical Note
I had the pleasure of seeing Wei in the [place] [facility] today regarding redness and swelling of the skin of the left hand and forearm following his having found an embedded tick.  I think that he has cellulitis with mild lymphangitis, and that he will eventually respond to the doxycycline that he was prescribed a couple of days ago in urgent care.  He is going to continue doxycycline to completion, and will monitor the extent and severity of erythema of the hand and forearm closely.  He will let you know if things appear to be worsening or spreading.  I am back here in [facility] Thursday and Friday of this week, and would be happy to reevaluate him if things appear to be unimproved.  Thank you.

## 2025-05-27 NOTE — PATIENT INSTRUCTIONS
Your labs all looked pretty good: Your white blood cell count was not elevated, and the blood markers of inflammation that I sent were both normal.  The x-rays of your left hand showed evidence of arthritis, which does not surprise you, though did not show any foreign body, and showed only mild swelling of the tissues of the back of your left hand.    Overall, I continue to believe that this is a skin infection probably because of the tick that you found between your 2nd and 3rd fingers, and that it will eventually respond to antibiotics.  Please continue your doxycycline 100 mg tablets, 1 tablet twice daily until they are gone.  If the topical medication that you are using continues to help with your itch, please continue using it.  Meanwhile, please keep a close eye on the severity and the extent of the redness and swelling of the skin not only of the hand but of the right forearm, and please let your primary care office know if things appear to be worsening or spreading.  If that is the case, we could always reevaluate you back here in the [facility] later this week.    It was a pleasure meeting you today.  I hope this resolves soon.

## 2025-05-27 NOTE — TELEPHONE ENCOUNTER
Reason for Call:  Appointment Request    Patient requesting this type of appt:  Hospital/ED Follow-Up     Requested provider: Mini Gillis    Reason patient unable to be scheduled: Not within requested timeframe    When does patient want to be seen/preferred time: Was instructed to be seen within 48 hours.     Comments: Hand is still swollen and red.     Could we [facility] this information to you [facility] or would you prefer to receive a phone call?:   Patient would prefer a phone call   Okay to leave a detailed message?: Yes at Cell number on file:    Telephone Information:   Mobile [phone]       Call taken on 5/26/2025 at 8:37 PM by Taylor Hanna

## 2025-05-27 NOTE — PROGRESS NOTES
"Acute and Diagnostic Services Clinic Visit    Assessment & Plan     (L03.114) Cellulitis of left hand  (primary encounter diagnosis)  (I89.1) Lymphangitis of upper extremity  Comment: Presents with redness, induration, mild warmth, and swelling of the skin of the left hand.  Much of this is on the hand dorsum overlying MCP joints 2 and 3 as well as the skin between the 2nd and 3rd digits.  This occurs in the location where Wei found and removed a tick on 5/25/2025 (roughly 2.5 days ago).  There is a second, smaller area of erythema on the palmar aspect of the left hand overlying the second MCP where Wei was poked with a cactus spine, in which he thinks he removed completely.  However, he has a concern that he may still have a foreign body in that location.  Over the last 24 hours, Wei has noticed faint red streaking up the dorsal aspect of the left forearm, ending about midway to the elbow.  Wei has just taken his fifth dose of doxycycline 100 mg given him during an ED visit of 5/25/2025.  He has not noticed any subjective improvement in the appearance of the skin.  He feels well, however, and is afebrile.  His primary concern is that he could have \"blood poisoning\" based upon the lymphangitis, topic he was reading about on [employer].  Plan:   To get some idea as to his WBC and inflammatory status, will send labs: CBC with platelets, Basic metabolic panel,         Erythrocyte sedimentation rate auto, CRP         Inflammation.  2.   By exam I do not appreciate a foreign body overlying the second MCP joint on the palmar aspect, where he poked himself with a cactus spine.  I do not know if it cactus spine would be radiopaque.  However, we will x-ray the hand to see if there might be a foreign body in that region.  XR Hand Left G/E 3 Views ordered.  3.   I think the doxycycline was a good choice for the treatment of cellulitis, as it would also have efficacy against acute Lyme.  I suspect that antibiotics will " eventually improve the cellulitis, though I understand Wei's concern about the lack of improvement to date.  Unless labs are substantially abnormal, I think we will have him continue doxycycline to completion of his 7-day course, and keep a close eye on appearance of the hand and the area of lymphangitis on the forearm.    DISCUSSION/MEDICAL DECISION MAKING:  Labs were fairly unremarkable.  CBC was normal.  CRP was normal.  ESR was normal.  Basic metabolic was notable for evidence of CKD 3 with stable renal function compared to baseline.  Left hand films showed no fracture or foreign body.  There were advanced degenerative changes of a few joints of the wrist and hand, along with mild soft tissue swelling along the dorsum of the left hand.    I think that Wei has cellulitis and mild lymphangitis of the left hand and forearm, and it is my anticipation that he will eventually respond to doxycycline.  I discussed this with Wei and his wife; they expressed understanding and agreement.  Rohan is going to complete his 10-day course of doxycycline 100 mg twice daily, and will continue to use one of his topical medications as it seems to improve the itch.  Meanwhile, he will keep a close eye on the extent and severity of the erythema of both the left hand and left forearm, and will let his primary care provider know if things appear to be worsening/spreading.  If that is the case, reevaluation may be required, which we could certainly do here back in [facility].    49 minutes were spent doing chart review, history and exam, documentation and further activities per the note.     Subjective   Wei is a 75 year old, presenting for the following health issues:  Hand Problem (L hand swelling and redness, was seen in the ED 5/25/2025, tick was removed from between the index and middle finger, was on for about 6 hours, woke with redness and swelling of the hand and was seen in the ED.  Rx with doxycycline.  Redness is spreading up the  "wrist to the forearm, swelling is stable, itching.  Was given kenalog cream, does help with the itching. Patient states he was stuck by a cactus on Saturday, left hand palm between the index and middle finger, redness and sore at that site. )    HPI      Wei Kline is a 75-year-old man seen today in the [facility] Services ([facility]) clinic at [facility] regarding a red and swollen left hand.    For the past week or so, Wei has felt that he might have had some sort of skin irritation on his left hand dorsum and left forearm, including the possibility of poison ivy.  He was treating it with topical medications, including a topical steroid and calamine.  The appearance of the skin on the left hand dorsum failed to improve on topical therapy.  Then, in the early morning hours of 5/25/2025 (2 days ago) he examined the area more closely between his 2nd and 3rd digits of the left hand, and noted that there was a tick attached to the skin between those 2 fingers.  He was able to remove the tick without difficulty, does not think that it was embedded, and did not notice that it looked engorged.  Because of the presence of this tick in the appearance of the red and swollen skin of the left hand dorsum, he contacted his primary care provider on 5/25/2025, and was advised to go to ED for evaluation.  In ED, he was diagnosed as having contact dermatitis versus cellulitis due to tick bite, and was discharged with prescription for doxycycline 100 mg p.o. twice daily for a planned 10 days.  So far, Wei has taken 5 doses of doxycycline.    Wei presents to [facility] today out of concern that he could have \"blood poisoning\" from this skin infection, a topic that he was reading about online.  The redness and swelling of the skin of the left hand dorsum around the MCP joints 2 and 3 has not improved to his observation, and, over the last 24 hours, he has noticed faint red streaking up the dorsal aspect of the left forearm.    In " "addition, Wei reports that he had a cactus spine embedded into the palmar aspect of the left hand overlying MCP two.  He tried to remove the spine with his teeth, and thinks that he was successful, although he cannot be entirely sure that he does not have a retained foreign body in that area.  That area of skin has also become red and swollen, though only locally.    In general, Wei feels well.  He has not had fever.      Review of Systems  As per the history of present illness.  No additional positives or negatives are obtained.      Objective    /77 (BP Location: Right arm, Patient Position: Chair, Cuff Size: Adult Large)   Pulse 61   Temp 97.5  F (36.4  C) (Oral)   Ht 1.797 m (5' 10.75\")   Wt 110.5 kg (243 lb 9.6 oz)   SpO2 97%   BMI 34.22 kg/m    Body mass index is 34.22 kg/m .  Physical Exam   GENERAL: Very pleasant man, smiling, appears well.  EXTREMITIES: There is fairly well demarcated redness and swelling of the left hand dorsum overlying the 2nd and 3rd MCP joints and including the distal 2nd and 3rd digits.  The skin is red and slightly warm, but is not indurated.  No significant blistering is seen, and there is no discharge.  There is a tiny black dot in the skin between the 2nd and 3rd fingers of the left hand, which, Wei says, is the site where he found the wood tick 2 days ago.  Along with this focal area of erythema on the hand dorsum, there is faint erythema streaking up the dorsal aspect of the left forearm, reaching about detention between wrist and elbow.  That area is not warm or indurated.  Finally, on the palmar aspect of the left hand, overlying MCP joint 2, there is a focal, small area of erythema, roughly the size of the nickel.  I am not able to palpate a foreign body in that area.  Palpation does cause mild tenderness, however.  NEURO: Alert, oriented, conversant.  Cranial nerves III - XII grossly intact.  No gross motor or sensory deficits.   PSYCH: Calm, alert, conversant.  " Able to articulate logical thoughts, no tangential thoughts, no hallucinations or delusions.  Affect normal.    EXAM: XR HAND LEFT G/E 3 VIEWS  LOCATION: [facility]  DATE: 5/27/2025     INDICATION: Please evaluate for possible foreign body over palmar aspect left hand, overlying the second MCP joint.  COMPARISON: None.                                                                      IMPRESSION: No fracture or foreign body. Advanced degenerative changes at the radiolunate, first CMC and second MCP joints. Moderate subcortical cystic change in the distal ulna. Mild soft tissue swelling along the dorsum of the hand.    Results for orders placed or performed in visit on 05/27/25 (from the past 24 hours)   XR Hand Left G/E 3 Views    Narrative    EXAM: XR HAND LEFT G/E 3 VIEWS  LOCATION: [facility]  DATE: 5/27/2025    INDICATION: Please evaluate for possible foreign body over palmar aspect left hand, overlying the second MCP joint.  COMPARISON: None.      Impression    IMPRESSION: No fracture or foreign body. Advanced degenerative changes at the radiolunate, first CMC and second MCP joints. Moderate subcortical cystic change in the distal ulna. Mild soft tissue swelling along the dorsum of the hand.   CBC with platelets   Result Value Ref Range    WBC Count 7.7 4.0 - 11.0 10e3/uL    RBC Count 4.56 4.40 - 5.90 10e6/uL    Hemoglobin 14.3 13.3 - 17.7 g/dL    Hematocrit 42.5 40.0 - 53.0 %    MCV 93 78 - 100 fL    MCH 31.4 26.5 - 33.0 pg    MCHC 33.6 31.5 - 36.5 g/dL    RDW 13.3 10.0 - 15.0 %    Platelet Count 151 150 - 450 10e3/uL   Basic metabolic panel   Result Value Ref Range    Sodium 138 135 - 145 mmol/L    Potassium 4.8 3.4 - 5.3 mmol/L    Chloride 106 98 - 107 mmol/L    Carbon Dioxide (CO2) 22 22 - 29 mmol/L    Anion Gap 10 7 - 15 mmol/L    Urea Nitrogen 37.8 (H) 8.0 - 23.0 mg/dL    Creatinine 1.51 (H) 0.67 - 1.17 mg/dL    GFR Estimate 48 (L) >60  mL/min/1.73m2    Calcium 10.8 (H) 8.8 - 10.4 mg/dL    Glucose 83 70 - 99 mg/dL   Erythrocyte sedimentation rate auto   Result Value Ref Range    Erythrocyte Sedimentation Rate 13 0 - 20 mm/hr   CRP inflammation   Result Value Ref Range    CRP Inflammation <3.00 <5.00 mg/L         In 1016  Out 1055  In 1154  Out 1204    Signed Electronically by: Gabino Ybarra MD

## 2025-05-27 NOTE — TELEPHONE ENCOUNTER
Referral to Acute and Diagnostic Services    [phone] ([place]) [place] - [address] [place] [id number]    Transition to Acute & Diagnostic Services Clinic has been discussed with patient, and he agrees with next level of care.   Patient understands that evaluation/treatment at [facility] typically takes significantly longer than in clinic/urgent care (>2 hours).  The [facility] Acute and Diagnostics Services Clinic has been contacted by provider/staff to confirm patient acceptance.     Special issues:  None    The following provider has assessed this patient for intervention at [facility], and directed the patient for referral: Dr Ybarra in [facility]

## 2025-07-14 ENCOUNTER — TRANSFERRED RECORDS (OUTPATIENT)
Dept: HEALTH INFORMATION MANAGEMENT | Facility: CLINIC | Age: 75
End: 2025-07-14
Payer: COMMERCIAL

## 2025-08-27 ENCOUNTER — RESULTS FOLLOW-UP (OUTPATIENT)
Dept: FAMILY MEDICINE | Facility: CLINIC | Age: 75
End: 2025-08-27

## 2025-08-27 ENCOUNTER — OFFICE VISIT (OUTPATIENT)
Dept: FAMILY MEDICINE | Facility: CLINIC | Age: 75
End: 2025-08-27
Payer: COMMERCIAL

## 2025-08-27 VITALS
RESPIRATION RATE: 16 BRPM | DIASTOLIC BLOOD PRESSURE: 79 MMHG | BODY MASS INDEX: 34.75 KG/M2 | WEIGHT: 248.2 LBS | HEART RATE: 75 BPM | OXYGEN SATURATION: 98 % | TEMPERATURE: 97.8 F | HEIGHT: 71 IN | SYSTOLIC BLOOD PRESSURE: 121 MMHG

## 2025-08-27 DIAGNOSIS — Z86.79 HISTORY OF CARDIOMYOPATHY: ICD-10-CM

## 2025-08-27 DIAGNOSIS — D69.6 THROMBOCYTOPENIA: ICD-10-CM

## 2025-08-27 DIAGNOSIS — M1A.09X0 IDIOPATHIC CHRONIC GOUT OF MULTIPLE SITES WITHOUT TOPHUS: ICD-10-CM

## 2025-08-27 DIAGNOSIS — M17.11 PRIMARY OSTEOARTHRITIS OF RIGHT KNEE: ICD-10-CM

## 2025-08-27 DIAGNOSIS — Z01.818 PREOP GENERAL PHYSICAL EXAM: Primary | ICD-10-CM

## 2025-08-27 DIAGNOSIS — N18.31 STAGE 3A CHRONIC KIDNEY DISEASE (H): ICD-10-CM

## 2025-08-27 DIAGNOSIS — I10 ESSENTIAL HYPERTENSION: ICD-10-CM

## 2025-08-27 DIAGNOSIS — E78.5 HYPERLIPIDEMIA WITH TARGET LDL LESS THAN 100: ICD-10-CM

## 2025-08-27 DIAGNOSIS — G47.33 OSA (OBSTRUCTIVE SLEEP APNEA): ICD-10-CM

## 2025-08-27 DIAGNOSIS — E66.9 NON MORBID OBESITY: ICD-10-CM

## 2025-08-27 DIAGNOSIS — E89.0 POSTABLATIVE HYPOTHYROIDISM: ICD-10-CM

## 2025-08-27 PROBLEM — R73.9 HYPERGLYCEMIA: Status: RESOLVED | Noted: 2019-03-22 | Resolved: 2025-08-27

## 2025-08-27 LAB
ANION GAP SERPL CALCULATED.3IONS-SCNC: 11 MMOL/L (ref 7–15)
BUN SERPL-MCNC: 24.3 MG/DL (ref 8–23)
CALCIUM SERPL-MCNC: 10.2 MG/DL (ref 8.8–10.4)
CHLORIDE SERPL-SCNC: 108 MMOL/L (ref 98–107)
CREAT SERPL-MCNC: 1.36 MG/DL (ref 0.67–1.17)
EGFRCR SERPLBLD CKD-EPI 2021: 54 ML/MIN/1.73M2
GLUCOSE SERPL-MCNC: 91 MG/DL (ref 70–99)
HCO3 SERPL-SCNC: 22 MMOL/L (ref 22–29)
HGB BLD-MCNC: 13.7 G/DL (ref 13.3–17.7)
MCV RBC AUTO: 94 FL (ref 78–100)
POTASSIUM SERPL-SCNC: 4.6 MMOL/L (ref 3.4–5.3)
SODIUM SERPL-SCNC: 141 MMOL/L (ref 135–145)

## 2025-08-27 PROCEDURE — 80048 BASIC METABOLIC PNL TOTAL CA: CPT | Performed by: INTERNAL MEDICINE

## 2025-08-27 PROCEDURE — 36415 COLL VENOUS BLD VENIPUNCTURE: CPT | Performed by: INTERNAL MEDICINE

## 2025-08-27 PROCEDURE — 3074F SYST BP LT 130 MM HG: CPT | Performed by: INTERNAL MEDICINE

## 2025-08-27 PROCEDURE — 3078F DIAST BP <80 MM HG: CPT | Performed by: INTERNAL MEDICINE

## 2025-08-27 PROCEDURE — 99214 OFFICE O/P EST MOD 30 MIN: CPT | Performed by: INTERNAL MEDICINE

## 2025-08-27 PROCEDURE — 85018 HEMOGLOBIN: CPT | Performed by: INTERNAL MEDICINE

## 2025-08-27 PROCEDURE — 1125F AMNT PAIN NOTED PAIN PRSNT: CPT | Performed by: INTERNAL MEDICINE

## 2025-08-27 ASSESSMENT — PAIN SCALES - GENERAL: PAINLEVEL_OUTOF10: SEVERE PAIN (8)

## (undated) DEVICE — GLOVE PROTEXIS W/NEU-THERA 8.5  2D73TE85

## (undated) DEVICE — DISTRACTOR STRAP ANKLE ARTHRO AR-1712

## (undated) DEVICE — SOL NACL 0.9% IRRIG 1000ML BOTTLE 07138-09

## (undated) DEVICE — GOWN LG DISP 9515

## (undated) DEVICE — TUBING PUMP LINVATEC 10K150

## (undated) DEVICE — SU ETHILON 3-0 FS-1 18" 669H

## (undated) DEVICE — SOL NACL 0.9% IRRIG 3000ML BAG 07972-08

## (undated) DEVICE — DRAPE SHEET REV FOLD 3/4 9349

## (undated) DEVICE — ESU PENCIL W/COATED BLADE E2450H

## (undated) DEVICE — SYR BULB IRRIG 50ML LATEX FREE 0035280

## (undated) DEVICE — ADH TISSEEL FIBRIN SEALANT 2ML

## (undated) DEVICE — GLOVE PROTEXIS POWDER FREE 8.0 ORTHOPEDIC 2D73ET80

## (undated) DEVICE — SU VICRYL 2-0 SH 27" UND J417H

## (undated) DEVICE — SYR 20ML LL W/O NDL

## (undated) DEVICE — SOL NACL 0.9% 100ML BAG 2B1302

## (undated) DEVICE — SPLINT FIBERGLASS 4X30" PRE-CUT RESIN 76430

## (undated) DEVICE — NDL 18GA 1.5" 305196

## (undated) DEVICE — BUR SHAVER ARTHRO 4.0MM STERLING OVAL H9101

## (undated) DEVICE — BLADE SHAVER ARTHRO 3.5MM FULL RADIUS C9248

## (undated) DEVICE — PREP CHLORAPREP 26ML TINTED ORANGE  260815

## (undated) DEVICE — GLOVE PROTEXIS POWDER FREE 7.5 ORTHOPEDIC 2D73ET75

## (undated) DEVICE — BNDG ESMARK 4" STERILE 836-3412

## (undated) DEVICE — KIT ARTHROSCOPIC SURGICAL PROCEDURE DISP AR-8990DS

## (undated) DEVICE — BLADE KNIFE SURG 15 371115

## (undated) DEVICE — DECANTER BAG 2002S

## (undated) DEVICE — BNDG ELASTIC 6" DBL LENGTH UNSTERILE 6611-16

## (undated) DEVICE — ENDO SNARE EXACTO COLD 9MM LOOP 2.4MMX230CM 00711115

## (undated) DEVICE — PACK ARTHROSCOPY KNEE LATEX FREE SOP32CAFCN

## (undated) RX ORDER — FENTANYL CITRATE 50 UG/ML
INJECTION, SOLUTION INTRAMUSCULAR; INTRAVENOUS
Status: DISPENSED
Start: 2019-12-26

## (undated) RX ORDER — ACETAMINOPHEN 325 MG/1
TABLET ORAL
Status: DISPENSED
Start: 2019-12-26

## (undated) RX ORDER — GLYCOPYRROLATE 0.2 MG/ML
INJECTION, SOLUTION INTRAMUSCULAR; INTRAVENOUS
Status: DISPENSED
Start: 2019-12-26

## (undated) RX ORDER — PROPOFOL 10 MG/ML
INJECTION, EMULSION INTRAVENOUS
Status: DISPENSED
Start: 2020-06-30

## (undated) RX ORDER — KETOROLAC TROMETHAMINE 30 MG/ML
INJECTION, SOLUTION INTRAMUSCULAR; INTRAVENOUS
Status: DISPENSED
Start: 2019-12-26

## (undated) RX ORDER — LIDOCAINE HCL/EPINEPHRINE/PF 2%-1:200K
VIAL (ML) INJECTION
Status: DISPENSED
Start: 2019-12-26

## (undated) RX ORDER — ROPIVACAINE HYDROCHLORIDE 5 MG/ML
INJECTION, SOLUTION EPIDURAL; INFILTRATION; PERINEURAL
Status: DISPENSED
Start: 2019-12-26

## (undated) RX ORDER — ONDANSETRON 2 MG/ML
INJECTION INTRAMUSCULAR; INTRAVENOUS
Status: DISPENSED
Start: 2019-12-26

## (undated) RX ORDER — LIDOCAINE HYDROCHLORIDE 10 MG/ML
INJECTION, SOLUTION EPIDURAL; INFILTRATION; INTRACAUDAL; PERINEURAL
Status: DISPENSED
Start: 2020-06-30

## (undated) RX ORDER — LIDOCAINE HYDROCHLORIDE 10 MG/ML
INJECTION, SOLUTION EPIDURAL; INFILTRATION; INTRACAUDAL; PERINEURAL
Status: DISPENSED
Start: 2019-12-26

## (undated) RX ORDER — DEXAMETHASONE SODIUM PHOSPHATE 4 MG/ML
INJECTION, SOLUTION INTRA-ARTICULAR; INTRALESIONAL; INTRAMUSCULAR; INTRAVENOUS; SOFT TISSUE
Status: DISPENSED
Start: 2019-12-26

## (undated) RX ORDER — GLYCOPYRROLATE 0.2 MG/ML
INJECTION, SOLUTION INTRAMUSCULAR; INTRAVENOUS
Status: DISPENSED
Start: 2020-06-30

## (undated) RX ORDER — PROPOFOL 10 MG/ML
INJECTION, EMULSION INTRAVENOUS
Status: DISPENSED
Start: 2019-12-26

## (undated) RX ORDER — LIDOCAINE HYDROCHLORIDE 10 MG/ML
INJECTION, SOLUTION EPIDURAL; INFILTRATION; INTRACAUDAL; PERINEURAL
Status: DISPENSED
Start: 2024-06-03

## (undated) RX ORDER — GABAPENTIN 300 MG/1
CAPSULE ORAL
Status: DISPENSED
Start: 2019-12-26